# Patient Record
Sex: FEMALE | Race: WHITE | HISPANIC OR LATINO | Employment: FULL TIME | URBAN - METROPOLITAN AREA
[De-identification: names, ages, dates, MRNs, and addresses within clinical notes are randomized per-mention and may not be internally consistent; named-entity substitution may affect disease eponyms.]

---

## 2017-03-11 ENCOUNTER — APPOINTMENT (OUTPATIENT)
Dept: GENERAL RADIOLOGY | Facility: CLINIC | Age: 22
End: 2017-03-11
Attending: EMERGENCY MEDICINE

## 2017-03-11 ENCOUNTER — HOSPITAL ENCOUNTER (EMERGENCY)
Facility: CLINIC | Age: 22
Discharge: HOME | End: 2017-03-11
Attending: EMERGENCY MEDICINE | Admitting: EMERGENCY MEDICINE

## 2017-03-11 VITALS
BODY MASS INDEX: 33.44 KG/M2 | HEART RATE: 91 BPM | OXYGEN SATURATION: 98 % | RESPIRATION RATE: 18 BRPM | SYSTOLIC BLOOD PRESSURE: 116 MMHG | HEIGHT: 63 IN | DIASTOLIC BLOOD PRESSURE: 71 MMHG | WEIGHT: 188.71 LBS | TEMPERATURE: 98.3 F

## 2017-03-11 DIAGNOSIS — N76.0 BACTERIAL VAGINITIS: ICD-10-CM

## 2017-03-11 DIAGNOSIS — J02.0 STREPTOCOCCAL SORE THROAT: ICD-10-CM

## 2017-03-11 DIAGNOSIS — B96.89 BACTERIAL VAGINITIS: ICD-10-CM

## 2017-03-11 LAB
ALBUMIN SERPL-MCNC: 3.9 G/DL (ref 3.4–5)
ALBUMIN UR-MCNC: 30 MG/DL
ALP SERPL-CCNC: 76 U/L (ref 40–150)
ALT SERPL W P-5'-P-CCNC: 25 U/L (ref 0–50)
ANION GAP SERPL CALCULATED.3IONS-SCNC: 9 MMOL/L (ref 3–14)
APPEARANCE UR: ABNORMAL
AST SERPL W P-5'-P-CCNC: 22 U/L (ref 0–45)
BACTERIA #/AREA URNS HPF: ABNORMAL /HPF
BASOPHILS # BLD AUTO: 0 10E9/L (ref 0–0.2)
BASOPHILS NFR BLD AUTO: 0.1 %
BILIRUB SERPL-MCNC: 0.5 MG/DL (ref 0.2–1.3)
BILIRUB UR QL STRIP: NEGATIVE
BUN SERPL-MCNC: 9 MG/DL (ref 7–30)
CALCIUM SERPL-MCNC: 8.5 MG/DL (ref 8.5–10.1)
CHLORIDE SERPL-SCNC: 105 MMOL/L (ref 94–109)
CO2 SERPL-SCNC: 25 MMOL/L (ref 20–32)
COLOR UR AUTO: YELLOW
CREAT SERPL-MCNC: 0.57 MG/DL (ref 0.52–1.04)
DEPRECATED S PYO AG THROAT QL EIA: ABNORMAL
DIFFERENTIAL METHOD BLD: ABNORMAL
EOSINOPHIL # BLD AUTO: 0 10E9/L (ref 0–0.7)
EOSINOPHIL NFR BLD AUTO: 0 %
ERYTHROCYTE [DISTWIDTH] IN BLOOD BY AUTOMATED COUNT: 13.7 % (ref 10–15)
FLUAV+FLUBV AG SPEC QL: NEGATIVE
FLUAV+FLUBV AG SPEC QL: NORMAL
GFR SERPL CREATININE-BSD FRML MDRD: ABNORMAL ML/MIN/1.7M2
GLUCOSE SERPL-MCNC: 103 MG/DL (ref 70–99)
GLUCOSE UR STRIP-MCNC: NEGATIVE MG/DL
HCG SERPL QL: NEGATIVE
HCT VFR BLD AUTO: 40.6 % (ref 35–47)
HGB BLD-MCNC: 13.3 G/DL (ref 11.7–15.7)
HGB UR QL STRIP: ABNORMAL
IMM GRANULOCYTES # BLD: 0.1 10E9/L (ref 0–0.4)
IMM GRANULOCYTES NFR BLD: 0.6 %
KETONES UR STRIP-MCNC: NEGATIVE MG/DL
LACTATE BLD-SCNC: 1.3 MMOL/L (ref 0.7–2.1)
LEUKOCYTE ESTERASE UR QL STRIP: NEGATIVE
LIPASE SERPL-CCNC: 239 U/L (ref 73–393)
LYMPHOCYTES # BLD AUTO: 0.7 10E9/L (ref 0.8–5.3)
LYMPHOCYTES NFR BLD AUTO: 5.2 %
MCH RBC QN AUTO: 30.2 PG (ref 26.5–33)
MCHC RBC AUTO-ENTMCNC: 32.8 G/DL (ref 31.5–36.5)
MCV RBC AUTO: 92 FL (ref 78–100)
MICRO REPORT STATUS: ABNORMAL
MICRO REPORT STATUS: ABNORMAL
MONOCYTES # BLD AUTO: 0.5 10E9/L (ref 0–1.3)
MONOCYTES NFR BLD AUTO: 3.3 %
MUCOUS THREADS #/AREA URNS LPF: PRESENT /LPF
NEUTROPHILS # BLD AUTO: 12.8 10E9/L (ref 1.6–8.3)
NEUTROPHILS NFR BLD AUTO: 90.8 %
NITRATE UR QL: NEGATIVE
NRBC # BLD AUTO: 0 10*3/UL
NRBC BLD AUTO-RTO: 0 /100
PH UR STRIP: 8 PH (ref 5–7)
PLATELET # BLD AUTO: 316 10E9/L (ref 150–450)
POTASSIUM SERPL-SCNC: 3.6 MMOL/L (ref 3.4–5.3)
PROT SERPL-MCNC: 8.4 G/DL (ref 6.8–8.8)
RBC # BLD AUTO: 4.41 10E12/L (ref 3.8–5.2)
RBC #/AREA URNS AUTO: 66 /HPF (ref 0–2)
SODIUM SERPL-SCNC: 139 MMOL/L (ref 133–144)
SP GR UR STRIP: 1.02 (ref 1–1.03)
SPECIMEN SOURCE: ABNORMAL
SPECIMEN SOURCE: ABNORMAL
SPECIMEN SOURCE: NORMAL
SQUAMOUS #/AREA URNS AUTO: 12 /HPF (ref 0–1)
URN SPEC COLLECT METH UR: ABNORMAL
UROBILINOGEN UR STRIP-MCNC: 0 MG/DL (ref 0–2)
WBC # BLD AUTO: 14.1 10E9/L (ref 4–11)
WBC #/AREA URNS AUTO: 16 /HPF (ref 0–2)
WET PREP SPEC: ABNORMAL

## 2017-03-11 PROCEDURE — 96361 HYDRATE IV INFUSION ADD-ON: CPT

## 2017-03-11 PROCEDURE — 84703 CHORIONIC GONADOTROPIN ASSAY: CPT | Performed by: EMERGENCY MEDICINE

## 2017-03-11 PROCEDURE — 87880 STREP A ASSAY W/OPTIC: CPT | Performed by: EMERGENCY MEDICINE

## 2017-03-11 PROCEDURE — 71020 XR CHEST 2 VW: CPT

## 2017-03-11 PROCEDURE — 87804 INFLUENZA ASSAY W/OPTIC: CPT | Performed by: EMERGENCY MEDICINE

## 2017-03-11 PROCEDURE — 87210 SMEAR WET MOUNT SALINE/INK: CPT | Performed by: EMERGENCY MEDICINE

## 2017-03-11 PROCEDURE — 83605 ASSAY OF LACTIC ACID: CPT | Performed by: EMERGENCY MEDICINE

## 2017-03-11 PROCEDURE — 25000128 H RX IP 250 OP 636: Performed by: EMERGENCY MEDICINE

## 2017-03-11 PROCEDURE — 99284 EMERGENCY DEPT VISIT MOD MDM: CPT | Mod: 25

## 2017-03-11 PROCEDURE — 87591 N.GONORRHOEAE DNA AMP PROB: CPT | Performed by: EMERGENCY MEDICINE

## 2017-03-11 PROCEDURE — 87491 CHLMYD TRACH DNA AMP PROBE: CPT | Performed by: EMERGENCY MEDICINE

## 2017-03-11 PROCEDURE — 25000132 ZZH RX MED GY IP 250 OP 250 PS 637: Performed by: EMERGENCY MEDICINE

## 2017-03-11 PROCEDURE — 81001 URINALYSIS AUTO W/SCOPE: CPT | Performed by: EMERGENCY MEDICINE

## 2017-03-11 PROCEDURE — 36415 COLL VENOUS BLD VENIPUNCTURE: CPT

## 2017-03-11 PROCEDURE — 80053 COMPREHEN METABOLIC PANEL: CPT | Performed by: EMERGENCY MEDICINE

## 2017-03-11 PROCEDURE — 96374 THER/PROPH/DIAG INJ IV PUSH: CPT

## 2017-03-11 PROCEDURE — 85025 COMPLETE CBC W/AUTO DIFF WBC: CPT | Performed by: EMERGENCY MEDICINE

## 2017-03-11 PROCEDURE — 87040 BLOOD CULTURE FOR BACTERIA: CPT | Performed by: EMERGENCY MEDICINE

## 2017-03-11 PROCEDURE — 83690 ASSAY OF LIPASE: CPT | Performed by: EMERGENCY MEDICINE

## 2017-03-11 PROCEDURE — 87086 URINE CULTURE/COLONY COUNT: CPT | Performed by: EMERGENCY MEDICINE

## 2017-03-11 PROCEDURE — 25800025 ZZH RX 258: Performed by: EMERGENCY MEDICINE

## 2017-03-11 RX ORDER — CLINDAMYCIN HCL 300 MG
300 CAPSULE ORAL 3 TIMES DAILY
Qty: 30 CAPSULE | Refills: 0 | Status: SHIPPED | OUTPATIENT
Start: 2017-03-11 | End: 2017-03-21

## 2017-03-11 RX ORDER — ACETAMINOPHEN 500 MG
1000 TABLET ORAL EVERY 4 HOURS PRN
Status: DISCONTINUED | OUTPATIENT
Start: 2017-03-11 | End: 2017-03-12 | Stop reason: HOSPADM

## 2017-03-11 RX ORDER — SODIUM CHLORIDE, SODIUM LACTATE, POTASSIUM CHLORIDE, CALCIUM CHLORIDE 600; 310; 30; 20 MG/100ML; MG/100ML; MG/100ML; MG/100ML
INJECTION, SOLUTION INTRAVENOUS CONTINUOUS
Status: DISCONTINUED | OUTPATIENT
Start: 2017-03-11 | End: 2017-03-12 | Stop reason: HOSPADM

## 2017-03-11 RX ORDER — KETOROLAC TROMETHAMINE 30 MG/ML
30 INJECTION, SOLUTION INTRAMUSCULAR; INTRAVENOUS ONCE
Status: COMPLETED | OUTPATIENT
Start: 2017-03-11 | End: 2017-03-11

## 2017-03-11 RX ADMIN — SODIUM CHLORIDE, POTASSIUM CHLORIDE, SODIUM LACTATE AND CALCIUM CHLORIDE 2568 ML: 600; 310; 30; 20 INJECTION, SOLUTION INTRAVENOUS at 21:16

## 2017-03-11 RX ADMIN — KETOROLAC TROMETHAMINE 30 MG: 30 INJECTION, SOLUTION INTRAMUSCULAR at 23:06

## 2017-03-11 RX ADMIN — ACETAMINOPHEN 1000 MG: 500 TABLET, FILM COATED ORAL at 21:24

## 2017-03-11 NOTE — ED AVS SNAPSHOT
[facility] Emergency Department    [address] [place] [address]    Phone:  [phone]    Fax:  [phone]                                       Stacia Hannon   MRN: [id number]    Department:  [facility] Emergency Department   Date of Visit:  3/11/2017           Patient Information     Date Of Birth          1995        Your diagnoses for this visit were:     Streptococcal sore throat     Bacterial vaginitis        You were seen by Korin Hou MD.        Discharge Instructions       Get extra rest and drink plenty of fluids. You may take acetaminophen (Tylenol) 650mg or ibuprofen (Advil/Motrin) 600mg, up to every 6 hours, with food, for fevers/aches.     If you were prescribed medications for your symptoms you may use them as instructed.    See your OB/GYN for reevaluation for the vaginal bleeding and infection. See your primary doctor if your symptoms of sore throat are not improving in 2 days.    If you have any worsening/severe symptoms seek medical care right away.       Pharyngitis: Strep (Confirmed)     You have had a positive test for strep throat. Strep throat is a contagious illness. It is spread by coughing, kissing or by touching others after touching your mouth or nose. Symptoms include throat pain which is worse with swallowing, aching all over, headache and fever. It is treated with antibiotic medication. This should help you start to feel better within 1-2 days.  Home care    Rest at home. Drink plenty of fluids to avoid dehydration.    No work or school for the first 2 days of taking the antibiotics. After this time, you will not be contagious. You can then return to school or work if you are feeling better.     The antibiotic medication must be taken for the full 10 days, even if you feel better. This is very important to ensure the infection is treated. It is also important to prevent drug-resistent organisms from developing. If you were  given an antibiotic shot, no more antibiotics are needed.    You may use acetaminophen (Tylenol) or ibuprofen (Motrin, Advil) to control pain or fever, unless another medicine was prescribed for this. (NOTE: If you have chronic liver or kidney disease or ever had a stomach ulcer or GI bleeding, talk with your doctor before using these medicines.)    Throat lozenges or sprays (such as Chloraseptic) help reduce pain. Gargling with warm salt water will also reduce throat pain. Dissolve 1/2 teaspoon of salt in 1 glass of warm water. This may be useful just before meals.     Soft foods are okay. Avoid salty or spicy foods.  Follow-up care  Follow up with your healthcare provider or our staff if you are not improving over the next week.  When to seek medical advice  Call your healthcare provider right away if any of these occur:    Fever as directed by your doctor     New or worsening ear pain, sinus pain, or headache    Painful lumps in the back of neck    Stiff neck    Lymph nodes are getting larger or becoming soft in the middle    Inability to swallow liquids, excessive drooling, or inability to open mouth wide due to throat pain    Signs of dehydration (very dark urine or no urine, sunken eyes, dizziness)    Trouble breathing or noisy breathing    Muffled voice    New rash    3344-9947 [employer]. [address] [place] [address], [place] [address]. All rights reserved. This information is not intended as a substitute for professional medical care. Always follow your healthcare professional's instructions.      Bacterial Vaginosis    You have a vaginal infection called bacterial vaginosis (BV). Both good and bad bacteria are present in a healthy vagina. BV occurs when these bacteria get out of balance. The number of bad bacteria increase. And the number of good bacteria decrease.  BV may or may not cause symptoms. If symptoms do occur, they can include:    Thin, gray, milky-white, or sometimes green  discharge    Unpleasant odor or  fishy  smell    Itching, burning, or pain in or around the vagina  It is not known what causes BV, but certain factors can make the problem more likely. This can include:    Douching    Having sex with a new partner    Having sex with more than one partner  BV will sometimes go away on its own. But treatment is usually recommended. This is because untreated BV can increase the risk of more serious health problems such as:    Pelvic inflammatory disease (PID)     delivery (giving birth to a baby early if you re pregnant)    HIV and certain other sexually transmitted diseases (STDs)    Infection after surgery on the reproductive organs  Home care  General care    BV is most often treated with medicines called antibiotics. These may be given as pills or as a vaginal cream. If antibiotics are prescribed, be sure to use them exactly as directed. Also, be sure to complete all of the medicine, even if your symptoms go away.    Avoid douching or having sex during treatment.    If you have sex with a female partner, ask your healthcare provider if she should also be treated.  Prevention    Limit or avoid douching.    Avoid having sex. If you do have sex, then take steps to lower your risk:    Use condoms when having sex.    Limit the number of partners you have sex with.  Follow-up care  Follow up with your healthcare provider, or as advised.  When to seek medical advice  Call your healthcare provider right away if:    You have a fever of 100.4 F (38 C) or higher, or as directed by your provider.    Your symptoms worsen, or they don t go away within a few days of starting treatment.    You have new pain in the lower belly or pelvic region.    You have side effects that bother you or a reaction to the pills or cream you re prescribed.    You or any partners you have sex with have new symptoms, such as a rash, joint pain, or sores.    1175-0376 [employer]. [address] [place] [address]  [facility], [place] [address]. All rights reserved. This information is not intended as a substitute for professional medical care. Always follow your healthcare professional's instructions.      Dysfunctional Uterine Bleeding    Dysfunctional uterine bleeding is a condition in which bleeding is abnormal and occurs at unexpected times of the month. This happens due to changes in the hormones that help control a woman s menstrual cycle each month.  The bleeding may be heavier or lighter than normal. If you have heavy bleeding often, this can lead to a problem called anemia. With anemia, your red blood cell count is too low. Red blood cells are needed because they help carry oxygen throughout your body. Severe anemia may cause you to look pale and feel very weak or tired. You might also become short of breath easily.  To treat dysfunctional uterine bleeding, medicines are often tried first. If these don t help, further testing and treatments may be needed. Discuss all of your options with your provider.  Home care  Medicines  If you re prescribed medicines, be sure to take them as directed. Some of the more common medicines you may be prescribed include:    Hormone therapy (Options include most methods of hormonal birth control such as pills, shots, or a hormone-releasing IUD)    Nonsteroidal anti-inflammatory drugs (NSAIDs), such as ibuprofen    Iron supplements, if you have anemia     General care    Get plenty of rest if you tire easily. Avoid heavy exertion.    To help relieve pain or cramping that may occur with bleeding, try using a heating pad on the lower belly or back. A warm bath may also help.  Follow-up care  Follow up with your healthcare provider as directed.  When to seek medical advice  Call your healthcare provider right away if:    Bleeding becomes heavy (soaking 1 pad or tampon every hour for 3 hours)    Increased abdominal pain    Irregular bleeding worsens or does not get better even with  treatment    Fever of 100.4 F (38 C) or higher, or as directed by your provider    Signs of anemia, such as pale skin, extreme fatigue or weakness, or shortness of breath    Dizziness or fainting       [id number] [employer]. [address] [place] [address], [place] [address]. All rights reserved. This information is not intended as a substitute for professional medical care. Always follow your healthcare professional's instructions.              24 Hour Appointment Hotline       To make an appointment at any [facility], call [phone] ([phone]). If you don't have a family doctor or clinic, we will help you find one. [facility] clinics are conveniently located to serve the needs of you and your family.             Review of your medicines      START taking        Dose / Directions Last dose taken    clindamycin 300 MG capsule   Commonly known as:  CLEOCIN   Dose:  300 mg   Quantity:  30 capsule        Take 1 capsule (300 mg) by mouth 3 times daily for 10 days   Refills:  0          Our records show that you are taking the medicines listed below. If these are incorrect, please call your family doctor or clinic.        Dose / Directions Last dose taken    HYDROcodone-acetaminophen 5-325 MG per tablet   Commonly known as:  NORCO   Dose:  1-2 tablet   Quantity:  15 tablet        Take 1-2 tablets by mouth every 4 hours as needed for pain   Refills:  0        ibuprofen 600 MG tablet   Commonly known as:  ADVIL/MOTRIN   Dose:  600 mg   Quantity:  30 tablet        Take 1 tablet (600 mg) by mouth every 6 hours as needed for moderate pain   Refills:  1        NYQUIL PO        Refills:  0        sucralfate 1 GM/10ML suspension   Commonly known as:  CARAFATE   Dose:  1 g   Quantity:  420 mL        Take 10 mLs (1 g) by mouth 4 times daily   Refills:  1                Prescriptions were sent or printed at these locations (1 Prescription)                   Other Prescriptions                Printed at  Department/Unit printer (1 of 1)         clindamycin (CLEOCIN) 300 MG capsule                Procedures and tests performed during your visit     Procedure/Test Number of Times Performed    Blood culture 2    CBC with platelets differential 1    Cardiac Continuous Monitoring 1    Chlamydia trachomatis PCR 1    Comprehensive metabolic panel 1    HCG qualitative 1    Influenza A/B antigen 1    Lactic acid whole blood 1    Lipase 1    Measure urine output 1    Neisseria gonorrhoea PCR 1    Patient care order 1    Prep for procedure - pelvic exam 1    Pulse oximetry nursing 1    Rapid strep screen 1    UA with Microscopic 1    Urine Culture Aerobic Bacterial 1    Wet prep 1    XR Chest 2 Views 1      Orders Needing Specimen Collection     None      Pending Results     Date and Time Order Name Status Description    3/11/2017 2111 Blood culture In process     3/11/2017 2052 Neisseria gonorrhoea PCR In process     3/11/2017 2052 Chlamydia trachomatis PCR In process     3/11/2017 2052 Blood culture Preliminary     3/11/2017 2052 Urine Culture Aerobic Bacterial In process             Pending Culture Results     Date and Time Order Name Status Description    3/11/2017 2111 Blood culture In process     3/11/2017 2052 Neisseria gonorrhoea PCR In process     3/11/2017 2052 Chlamydia trachomatis PCR In process     3/11/2017 2052 Blood culture Preliminary     3/11/2017 2052 Urine Culture Aerobic Bacterial In process              Test Results from your hospital stay     3/11/2017  9:33 PM - Interface, Flexilab Results      Component Results     Component Value Ref Range & Units Status    WBC 14.1 (H) 4.0 - 11.0 10e9/L Final    RBC Count 4.41 3.8 - 5.2 10e12/L Final    Hemoglobin 13.3 11.7 - 15.7 g/dL Final    Hematocrit 40.6 35.0 - 47.0 % Final    MCV 92 78 - 100 fl Final    MCH 30.2 26.5 - 33.0 pg Final    MCHC 32.8 31.5 - 36.5 g/dL Final    RDW 13.7 10.0 - 15.0 % Final    Platelet Count 316 150 - 450 10e9/L Final    Diff Method  Automated Method  Final    % Neutrophils 90.8 % Final    % Lymphocytes 5.2 % Final    % Monocytes 3.3 % Final    % Eosinophils 0.0 % Final    % Basophils 0.1 % Final    % Immature Granulocytes 0.6 % Final    Nucleated RBCs 0 0 /100 Final    Absolute Neutrophil 12.8 (H) 1.6 - 8.3 10e9/L Final    Absolute Lymphocytes 0.7 (L) 0.8 - 5.3 10e9/L Final    Absolute Monocytes 0.5 0.0 - 1.3 10e9/L Final    Absolute Eosinophils 0.0 0.0 - 0.7 10e9/L Final    Absolute Basophils 0.0 0.0 - 0.2 10e9/L Final    Abs Immature Granulocytes 0.1 0 - 0.4 10e9/L Final    Absolute Nucleated RBC 0.0  Final         3/11/2017  9:49 PM - Interface, Flexilab Results      Component Results     Component Value Ref Range & Units Status    Sodium 139 133 - 144 mmol/L Final    Potassium 3.6 3.4 - 5.3 mmol/L Final    Chloride 105 94 - 109 mmol/L Final    Carbon Dioxide 25 20 - 32 mmol/L Final    Anion Gap 9 3 - 14 mmol/L Final    Glucose 103 (H) 70 - 99 mg/dL Final    Urea Nitrogen 9 7 - 30 mg/dL Final    Creatinine 0.57 0.52 - 1.04 mg/dL Final    GFR Estimate >90  Non  GFR Calc   >60 mL/min/1.7m2 Final    GFR Estimate If Black >90   GFR Calc   >60 mL/min/1.7m2 Final    Calcium 8.5 8.5 - 10.1 mg/dL Final    Bilirubin Total 0.5 0.2 - 1.3 mg/dL Final    Albumin 3.9 3.4 - 5.0 g/dL Final    Protein Total 8.4 6.8 - 8.8 g/dL Final    Alkaline Phosphatase 76 40 - 150 U/L Final    ALT 25 0 - 50 U/L Final    AST 22 0 - 45 U/L Final         3/11/2017  9:36 PM - Interface, Flexilab Results      Component Results     Component Value Ref Range & Units Status    Lactic Acid 1.3 0.7 - 2.1 mmol/L Final         3/11/2017 11:27 PM - Interface, Flexilab Results      Component Results     Component Value Ref Range & Units Status    Color Urine Yellow  Final    Appearance Urine Slightly Cloudy  Final    Glucose Urine Negative NEG mg/dL Final    Bilirubin Urine Negative NEG Final    Ketones Urine Negative NEG mg/dL Final    Specific Gravity  Urine 1.020 1.003 - 1.035 Final    Blood Urine Large (A) NEG Final    pH Urine 8.0 (H) 5.0 - 7.0 pH Final    Protein Albumin Urine 30 (A) NEG mg/dL Final    Urobilinogen mg/dL 0.0 0.0 - 2.0 mg/dL Final    Nitrite Urine Negative NEG Final    Leukocyte Esterase Urine Negative NEG Final    Source Midstream Urine  Final    WBC Urine 16 (H) 0 - 2 /HPF Final    RBC Urine 66 (H) 0 - 2 /HPF Final    Bacteria Urine Moderate (A) NEG /HPF Final    Squamous Epithelial /HPF Urine 12 (H) 0 - 1 /HPF Final    Mucous Urine Present (A) NEG /LPF Final         3/11/2017 11:21 PM - Interface, Flexilab Results         3/11/2017 11:21 PM - Interface, Flexilab Results      Component Results     Component    Specimen Description    Blood Right Arm    Special Requests    Aerobic and anaerobic bottles received    Culture Micro    Pending    Micro Report Status    Pending         3/11/2017  9:40 PM - Interface, Flexilab Results      Component Results     Component    Specimen Description    Throat    Rapid Strep A Screen (Abnormal)    POSITIVE: Group A Streptococcal antigen detected by immunoassay.    Micro Report Status    FINAL 03/11/2017         3/11/2017 10:18 PM - Interface, Radiant Ib      Narrative     CHEST TWO VIEW   3/11/2017 9:42 PM     HISTORY: Fever.    COMPARISON: 9/25/2016        Impression     IMPRESSION: No acute cardiopulmonary disease.    ROM RUBY MD         3/11/2017 10:08 PM - Interface, Flexilab Results      Component Results     Component Value Ref Range & Units Status    HCG Qualitative Serum Negative NEG Final         3/11/2017 11:27 PM - Interface, Flexilab Results      Component Results     Component    Specimen Description    Vagina    Wet Prep (Abnormal)    Moderate PMNs seen  Clue cells seen  No yeast seen  No Trichomonas seen      Micro Report Status    FINAL 03/11/2017         3/11/2017 11:19 PM - Interface, Flexilab Results         3/11/2017 11:19 PM - Interface, Flexilab Results         3/11/2017  9:49 PM -  Interface, Flexilab Results      Component Results     Component Value Ref Range & Units Status    Lipase 239 73 - 393 U/L Final         3/11/2017 10:41 PM - Interface, Flexilab Results         3/11/2017  9:55 PM - Interface, Flexilab Results      Component Results     Component Value Ref Range & Units Status    Influenza A/B Agn Specimen Nasopharyngeal  Final    Influenza A Negative NEG Final    Influenza B  NEG Final    Negative   Test results must be correlated with clinical data. If necessary, results   should be confirmed by a molecular assay or viral culture.                  Clinical Quality Measure: Blood Pressure Screening     Your blood pressure was checked while you were in the emergency department today. The last reading we obtained was  BP: 96/41 . Please read the guidelines below about what these numbers mean and what you should do about them.  If your systolic blood pressure (the top number) is less than 120 and your diastolic blood pressure (the bottom number) is less than 80, then your blood pressure is normal. There is nothing more that you need to do about it.  If your systolic blood pressure (the top number) is 120-139 or your diastolic blood pressure (the bottom number) is 80-89, your blood pressure may be higher than it should be. You should have your blood pressure rechecked within a year by a primary care provider.  If your systolic blood pressure (the top number) is 140 or greater or your diastolic blood pressure (the bottom number) is 90 or greater, you may have high blood pressure. High blood pressure is treatable, but if left untreated over time it can put you at risk for heart attack, stroke, or kidney failure. You should have your blood pressure rechecked by a primary care provider within the next 4 weeks.  If your provider in the emergency department today gave you specific instructions to follow-up with your doctor or provider even sooner than that, you should follow that instruction  "and not wait for up to 4 weeks for your follow-up visit.        Thank you for choosing [facility]       Thank you for choosing [facility] for your care. Our goal is always to provide you with excellent care. Hearing back from our patients is one way we can continue to improve our services. Please take a few minutes to complete the written survey that you may receive in the mail after you visit with us. Thank you!        [employer] Information     [employer] lets you send messages to your doctor, view your test results, renew your prescriptions, schedule appointments and more. To sign up, go to [email or web address] . Click on \"Log in\" on the left side of the screen, which will take you to the Welcome page. Then click on \"Sign up Now\" on the right side of the page.     You will be asked to enter the access code listed below, as well as some personal information. Please follow the directions to create your username and password.     Your access code is: [email or web address]  Expires: 2017 11:42 PM     Your access code will  in 90 days. If you need help or a new code, please call your [facility] clinic or [phone].        Care EveryWhere ID     This is your Care EveryWhere ID. This could be used by other organizations to access your [facility] medical records  [id number]        After Visit Summary       This is your record. Keep this with you and show to your community pharmacist(s) and doctor(s) at your next visit.                  "

## 2017-03-12 ENCOUNTER — HOSPITAL ENCOUNTER (EMERGENCY)
Facility: CLINIC | Age: 22
Discharge: HOME | End: 2017-03-12
Attending: EMERGENCY MEDICINE | Admitting: EMERGENCY MEDICINE

## 2017-03-12 VITALS
RESPIRATION RATE: 16 BRPM | HEART RATE: 86 BPM | TEMPERATURE: 99.3 F | SYSTOLIC BLOOD PRESSURE: 92 MMHG | OXYGEN SATURATION: 96 % | DIASTOLIC BLOOD PRESSURE: 54 MMHG

## 2017-03-12 DIAGNOSIS — J02.0 STREPTOCOCCAL SORE THROAT: ICD-10-CM

## 2017-03-12 LAB
C TRACH DNA SPEC QL NAA+PROBE: NORMAL
CO2 BLDCOV-SCNC: 23 MMOL/L (ref 21–28)
LACTATE BLD-SCNC: 0.9 MMOL/L (ref 0.7–2.1)
N GONORRHOEA DNA SPEC QL NAA+PROBE: NORMAL
PCO2 BLDV: 33 MM HG (ref 40–50)
PH BLDV: 7.44 PH (ref 7.32–7.43)
PO2 BLDV: 40 MM HG (ref 25–47)
SAO2 % BLDV FROM PO2: 78 %
SPECIMEN SOURCE: NORMAL
SPECIMEN SOURCE: NORMAL

## 2017-03-12 PROCEDURE — 25000128 H RX IP 250 OP 636: Performed by: EMERGENCY MEDICINE

## 2017-03-12 PROCEDURE — 96376 TX/PRO/DX INJ SAME DRUG ADON: CPT | Performed by: EMERGENCY MEDICINE

## 2017-03-12 PROCEDURE — 99284 EMERGENCY DEPT VISIT MOD MDM: CPT | Mod: Z6 | Performed by: EMERGENCY MEDICINE

## 2017-03-12 PROCEDURE — 99285 EMERGENCY DEPT VISIT HI MDM: CPT | Mod: 25 | Performed by: EMERGENCY MEDICINE

## 2017-03-12 PROCEDURE — 96361 HYDRATE IV INFUSION ADD-ON: CPT | Performed by: EMERGENCY MEDICINE

## 2017-03-12 PROCEDURE — 25000125 ZZHC RX 250: Performed by: EMERGENCY MEDICINE

## 2017-03-12 PROCEDURE — 25000128 H RX IP 250 OP 636: Performed by: FAMILY MEDICINE

## 2017-03-12 PROCEDURE — 83605 ASSAY OF LACTIC ACID: CPT

## 2017-03-12 PROCEDURE — 96375 TX/PRO/DX INJ NEW DRUG ADDON: CPT | Performed by: EMERGENCY MEDICINE

## 2017-03-12 PROCEDURE — 96374 THER/PROPH/DIAG INJ IV PUSH: CPT | Performed by: EMERGENCY MEDICINE

## 2017-03-12 PROCEDURE — 82803 BLOOD GASES ANY COMBINATION: CPT

## 2017-03-12 RX ORDER — KETOROLAC TROMETHAMINE 30 MG/ML
30 INJECTION, SOLUTION INTRAMUSCULAR; INTRAVENOUS ONCE
Status: COMPLETED | OUTPATIENT
Start: 2017-03-12 | End: 2017-03-12

## 2017-03-12 RX ORDER — CLINDAMYCIN PHOSPHATE 20 MG/G
1 CREAM VAGINAL AT BEDTIME
Qty: 40 G | Refills: 0 | Status: SHIPPED | OUTPATIENT
Start: 2017-03-12 | End: 2017-03-19

## 2017-03-12 RX ORDER — OLANZAPINE 5 MG/1
10 TABLET, ORALLY DISINTEGRATING ORAL ONCE
Status: DISCONTINUED | OUTPATIENT
Start: 2017-03-12 | End: 2017-03-12

## 2017-03-12 RX ORDER — DEXAMETHASONE SODIUM PHOSPHATE 10 MG/ML
10 INJECTION, SOLUTION INTRAMUSCULAR; INTRAVENOUS ONCE
Status: COMPLETED | OUTPATIENT
Start: 2017-03-12 | End: 2017-03-12

## 2017-03-12 RX ORDER — LORAZEPAM 2 MG/ML
1 INJECTION INTRAMUSCULAR ONCE
Status: DISCONTINUED | OUTPATIENT
Start: 2017-03-12 | End: 2017-03-12

## 2017-03-12 RX ORDER — SODIUM CHLORIDE 9 MG/ML
1000 INJECTION, SOLUTION INTRAVENOUS CONTINUOUS
Status: DISCONTINUED | OUTPATIENT
Start: 2017-03-12 | End: 2017-03-12 | Stop reason: HOSPADM

## 2017-03-12 RX ORDER — ONDANSETRON 2 MG/ML
4 INJECTION INTRAMUSCULAR; INTRAVENOUS EVERY 30 MIN PRN
Status: DISCONTINUED | OUTPATIENT
Start: 2017-03-12 | End: 2017-03-12 | Stop reason: HOSPADM

## 2017-03-12 RX ORDER — OXYCODONE HCL 5 MG/5 ML
5 SOLUTION, ORAL ORAL EVERY 4 HOURS PRN
Qty: 90 ML | Refills: 0 | Status: SHIPPED | OUTPATIENT
Start: 2017-03-12 | End: 2017-05-13

## 2017-03-12 RX ORDER — PENICILLIN V POTASSIUM 250 MG/5ML
250 SOLUTION, RECONSTITUTED, ORAL ORAL 2 TIMES DAILY
Qty: 100 ML | Refills: 0 | Status: SHIPPED | OUTPATIENT
Start: 2017-03-12 | End: 2017-05-13

## 2017-03-12 RX ORDER — HYDROMORPHONE HYDROCHLORIDE 1 MG/ML
0.5 INJECTION, SOLUTION INTRAMUSCULAR; INTRAVENOUS; SUBCUTANEOUS
Status: DISCONTINUED | OUTPATIENT
Start: 2017-03-12 | End: 2017-03-12 | Stop reason: HOSPADM

## 2017-03-12 RX ORDER — SODIUM CHLORIDE 9 MG/ML
1000 INJECTION, SOLUTION INTRAVENOUS CONTINUOUS
Status: DISCONTINUED | OUTPATIENT
Start: 2017-03-12 | End: 2017-03-12

## 2017-03-12 RX ADMIN — KETOROLAC TROMETHAMINE 30 MG: 30 INJECTION, SOLUTION INTRAMUSCULAR at 12:26

## 2017-03-12 RX ADMIN — SODIUM CHLORIDE 1000 ML: 9 INJECTION, SOLUTION INTRAVENOUS at 13:48

## 2017-03-12 RX ADMIN — SODIUM CHLORIDE 1000 ML: 9 INJECTION, SOLUTION INTRAVENOUS at 12:13

## 2017-03-12 RX ADMIN — HYDROMORPHONE HYDROCHLORIDE 1 MG: 1 INJECTION, SOLUTION INTRAMUSCULAR; INTRAVENOUS; SUBCUTANEOUS at 12:20

## 2017-03-12 RX ADMIN — SODIUM CHLORIDE 1000 ML: 9 INJECTION, SOLUTION INTRAVENOUS at 15:16

## 2017-03-12 RX ADMIN — HYDROMORPHONE HYDROCHLORIDE 0.5 MG: 10 INJECTION, SOLUTION INTRAMUSCULAR; INTRAVENOUS; SUBCUTANEOUS at 13:18

## 2017-03-12 RX ADMIN — DEXAMETHASONE SODIUM PHOSPHATE 10 MG: 10 INJECTION, SOLUTION INTRAMUSCULAR; INTRAVENOUS at 12:22

## 2017-03-12 NOTE — ED NOTES
Bed: ED27  Expected date:   Expected time:   Means of arrival: Ambulance  Comments:  Rocco Hopkins

## 2017-03-12 NOTE — ED AVS SNAPSHOT
[facility], Emergency Department    [address] [place] [address]    Phone:  [phone]    Fax:  [phone]                                       Stacia Hannon   MRN: [id number]    Department:  [facility], Emergency Department   Date of Visit:  3/12/2017           Patient Information     Date Of Birth          1995        Your diagnoses for this visit were:     Streptococcal sore throat        You were seen by Vipul Flores MD.        Discharge Instructions         Faringitis Por Estreptococos [Pharyngitis, Strep - Confirmed]    [place] prueba milad positiva a la infección por estreptococos. Se trata de ratna enfermedad contagiosa. La puede contagiar al toser, al besar o al tocar a otras personas después de haberse tocado la boca o la nariz. Los síntomas incluyen dolor de garganta que empeora al tragar, dolor en todo el cuerpo, dolor de tonio y fiebre. [place] tratamiento, le darán un antibiótico con el que debería empezar a sentirse mejor dentro de 1 ó 2 días.  Cuidados En [place]:    Descanse en mccurdy casa y christiano abundante cantidad de líquidos para evitar la deshidratación.    Deberá ausentarse del trabajo o la escuela los dos primeros días del tratamiento con antibióticos. Después de eso, la enfermedad ya no es contagiosa y, si se siente mejor, puede regresar a la escuela o el trabajo.    [place] los antibióticos deep los 10 días completos, incluso aunque se sienta melanie después de los primeros gerber de tratamiento. [place] es muy importante para evitar las afecciones del corazón o los riñones que pueden surgir ericka complicación de ratna infección de garganta por estreptococo no curada.    Niños: Use acetaminofén (Tylenol) para aliviar la fiebre, el nerviosismo y el malestar. En niños mayores de seis meses puede usar ibuprofeno (ericka Motrin infantil) en vez de Tylenol.  [NOTA: Si el kiley tiene ratna enfermedad hepática o renal crónica, o ha tenido alguna vez ratna úlcera estomacal o sangrado  gastrointestinal, consulte con rodgers médico antes de darle estos medicamentos.] (La aspirina no debe usarse nunca en personas menores de 18 años enfermas con fiebre, ya que puede causar daños graves al hígado.)Adultos: Puede usar acetaminofén (Tylenol) o ibuprofeno (Motrin o Advil) para controlar la fiebre o el dolor, a menos que le hayan recetado otro medicamento. [NOTA: Si tiene ratna enfermedad hepática o renal crónica, o ha tenido alguna vez ratna úlcera estomacal o sangrado gastrointestinal, consulte con rodgers médico antes de jose estos medicamentos.]    Hay comprimidos o sprays para la garganta (Chloraseptic y otros) que ayudarán a reducir el dolor. También puede hacer gárgaras con agua tibia con sal para aliviar el dolor de garganta. Disuelva   cucharadita de sal en 1 vaso de agua tibia. Resulta especialmente útil hacerlo antes de las comidas.  Programe ratna VISITA DE CONTROL con rodgers médico, o según le indique nuestro personal médico, si no empieza a sentirse mejor deep la semana próxima.  Busque Prontamente Atención Médica  si algo de lo siguiente ocurre:    Fiebre de 100.4 F (38 C) o más maricarmen, tomada oralmente, que no mejora con los medicamentos.    Dolor nuevo o que va empeorando en los oídos, los senos paranasales o la tonio.    Bultos dolorosos en la parte de atrás del carley.    No puede tragar líquidos ni abrir melanie la boca debido al dolor de garganta.    Tiene problemas para respirar o hace ruidos al respirar.    Disfonía (cambios en la voz).    Ratna nueva erupción cutánea (salpullido).    [id number] [employer]. [address] [place] [address], [place] [address]. Todos los derechos reservados. Esta información no pretende sustituir la atención médica profesional. Sólo rodgers médico puede diagnosticar y tratar un problema de reno.          24 Hour Appointment Hotline       To make an appointment at any [place] clinic, call [phone] ([phone]). If you don't have a family doctor or clinic, we will  help you find one. [facility] are conveniently located to serve the needs of you and your family.             Review of your medicines      START taking        Dose / Directions Last dose taken    clindamycin 2 % cream   Commonly known as:  CLEOCIN   Dose:  1 applicator   Quantity:  40 g        Place 1 applicator vaginally At Bedtime for 7 days   Refills:  0        oxyCODONE 5 MG/5ML solution   Commonly known as:  ROXICODONE   Dose:  5 mg   Quantity:  90 mL        Take 5 mLs (5 mg) by mouth every 4 hours as needed for pain maximum 30 mL per day   Refills:  0        penicillin V 250 mg/5 mL suspension   Commonly known as:  VEETID   Dose:  250 mg   Quantity:  100 mL        Take 5 mLs (250 mg) by mouth 2 times daily   Refills:  0          Our records show that you are taking the medicines listed below. If these are incorrect, please call your family doctor or clinic.        Dose / Directions Last dose taken    clindamycin 300 MG capsule   Commonly known as:  CLEOCIN   Dose:  300 mg   Quantity:  30 capsule        Take 1 capsule (300 mg) by mouth 3 times daily for 10 days   Refills:  0        ibuprofen 600 MG tablet   Commonly known as:  ADVIL/MOTRIN   Dose:  600 mg   Quantity:  30 tablet        Take 1 tablet (600 mg) by mouth every 6 hours as needed for moderate pain   Refills:  1        NYQUIL PO        Refills:  0                Prescriptions were sent or printed at these locations (3 Prescriptions)                   Other Prescriptions                Printed at Department/Unit printer (3 of 3)         penicillin V (VEETID) 250 mg/5 mL suspension               oxyCODONE (ROXICODONE) 5 MG/5ML solution               clindamycin (CLEOCIN) 2 % cream                Procedures and tests performed during your visit     ISTAT CG4 gases lactate nicole nursing POCT    ISTAT gases lactate nicole POCT    Peripheral IV: Standard      Orders Needing Specimen Collection     None      Pending Results     Date and Time Order Name  "Status Description    3/11/2017 2111 Blood culture Preliminary     3/11/2017 2052 Neisseria gonorrhoea PCR In process     3/11/2017 2052 Chlamydia trachomatis PCR In process     3/11/2017 2052 Blood culture Preliminary     3/11/2017 2052 Urine Culture Aerobic Bacterial Preliminary             Pending Culture Results     Date and Time Order Name Status Description    3/11/2017 2111 Blood culture Preliminary     3/11/2017 2052 Neisseria gonorrhoea PCR In process     3/11/2017 2052 Chlamydia trachomatis PCR In process     3/11/2017 2052 Blood culture Preliminary     3/11/2017 2052 Urine Culture Aerobic Bacterial Preliminary             Thank you for choosing [facility]       Thank you for choosing [facility] for your care. Our goal is always to provide you with excellent care. Hearing back from our patients is one way we can continue to improve our services. Please take a few minutes to complete the written survey that you may receive in the mail after you visit with us. Thank you!        [email or web address] Information     [employer] lets you send messages to your doctor, view your test results, renew your prescriptions, schedule appointments and more. To sign up, go to [email or web address] . Click on \"Log in\" on the left side of the screen, which will take you to the Welcome page. Then click on \"Sign up Now\" on the right side of the page.     You will be asked to enter the access code listed below, as well as some personal information. Please follow the directions to create your username and password.     Your access code is: [email or web address]  Expires: 6/10/2017 12:42 AM     Your access code will  in 90 days. If you need help or a new code, please call your [facility] clinic or [phone].        Care EveryWhere ID     This is your Care EveryWhere ID. This could be used by other organizations to access your [facility] medical records  [id number]        After Visit Summary       This is your record. Keep this with you and show to " your community pharmacist(s) and doctor(s) at your next visit.

## 2017-03-12 NOTE — ED AVS SNAPSHOT
[facility], [place], Emergency Department    [address]    [facility] [address]    Phone:  [phone]    Fax:  [phone]                                       Stacia Hannon   MRN: [id number]    Department:  [facility], Emergency Department   Date of Visit:  3/12/2017           After Visit Summary Signature Page     I have received my discharge instructions, and my questions have been answered. I have discussed any challenges I see with this plan with the nurse or doctor.    ..........................................................................................................................................  Patient/Patient Representative Signature      ..........................................................................................................................................  Patient Representative Print Name and Relationship to Patient    ..................................................               ................................................  Date                                            Time    ..........................................................................................................................................  Reviewed by Signature/Title    ...................................................              ..............................................  Date                                                            Time

## 2017-03-12 NOTE — ED PROVIDER NOTES
History     Chief Complaint   Patient presents with     Pharyngitis     pt has been ill 3 days has sore throat and upper back pain , seen at [facility] ED yesterday, sx now worse. pt did not  abx due to cost     HPI  Stacia Hannon is a 21 year old female who presents to the emergency department today with complaints of sore throat. Patient was seen in the ED at [facility] yesterday and was diagnosed with a strep throat. She was prescribed a course of clindamycin, however, patient states she did not fill the medication due to cost. Patient states she has not taken anything for the pain because she is having difficulty swallowing. She denies any other complaints currently.     I have reviewed the Medications, Allergies, Past Medical and Surgical History, and Social History in the [employer] system.    Past Medical History:   Diagnosis Date     Anemia complicating pregnancy in third trimester 2014     Irregular menses 2017      (normal spontaneous vaginal delivery)     full term     Pancreatitis Aug, 2014     Vaginal delivery 2014       Past Surgical History:   Procedure Laterality Date     LAPAROSCOPIC CHOLECYSTECTOMY WITH CHOLANGIOGRAMS N/A 2014    Procedure: LAPAROSCOPIC CHOLECYSTECTOMY WITH CHOLANGIOGRAMS;  Surgeon: Sheldon Watts MD;  Location:  OR       No family history on file.    Social History   Substance Use Topics     Smoking status: Never Smoker     Smokeless tobacco: Never Used     Alcohol use No     No current facility-administered medications for this encounter.      Current Outpatient Prescriptions   Medication     penicillin V (VEETID) 250 mg/5 mL suspension     oxyCODONE (ROXICODONE) 5 MG/5ML solution     Pseudoeph-Doxylamine-DM-APAP (NYQUIL PO)     ibuprofen (ADVIL,MOTRIN) 600 MG tablet      No Known Allergies    Review of Systems    ROS: 10 point ROS neg other than the symptoms noted above in the HPI.      Physical Exam   BP: 100/61  Pulse: 77  Temp: 101.8  F (38.8   C)  Resp: 18  SpO2: 97 %  Physical Exam Exam:  Constitutional: healthy, alert and no distress  Head: Normocephalic. No masses, lesions, tenderness or abnormalities  Neck: Neck supple. No adenopathy but posterior oropharynx is inflamed equally bilateral no evidence of abscess.. Thyroid symmetric, normal size,, Carotids without bruits.  ENT: ENT exam normal, no neck nodes or sinus tenderness        ED Course     ED Course     Procedures          Labs Ordered and Resulted from Time of ED Arrival Up to the Time of Departure from the ED   ISTAT  GASES LACTATE TRISTEN POCT - Abnormal; Notable for the following:        Result Value    Ph Venous 7.44 (*)     PCO2 Venous 33 (*)     All other components within normal limits   PERIPHERAL IV CATHETER   ISTAT CG4 GASES LACTATE TRISTEN NURSING POCT       Assessments & Plan (with Medical Decision Making)   Was given meds but had not filled and increase in pain. Will change to Pen V for cost concerns and equal effectiveness. No evidence of abscess and will need pain mgmt. Was given pain meds and decadron in the ED with mod effectiveness. No problem swallowing at the time of d/c.    I have reviewed the nursing notes.    I have reviewed the findings, diagnosis, plan and need for follow up with the patient.    Discharge Medication List as of 3/12/2017  3:42 PM      START taking these medications    Details   penicillin V (VEETID) 250 mg/5 mL suspension Take 5 mLs (250 mg) by mouth 2 times daily, Disp-100 mL, R-0, Local Print      oxyCODONE (ROXICODONE) 5 MG/5ML solution Take 5 mLs (5 mg) by mouth every 4 hours as needed for pain maximum 30 mL per day, Disp-90 mL, R-0, Local Print      clindamycin (CLEOCIN) 2 % cream Place 1 applicator vaginally At Bedtime for 7 daysDisp-40 g, R-0Local Print             Final diagnoses:   Streptococcal sore throat   I, Paulina Santana, am serving as a trained medical scribe to document services personally performed by Vipul Flores MD, based on the provider's  statements to me.      I, Vipul Flores MD, was physically present and have reviewed and verified the accuracy of this note documented by Paulina Santana.       3/12/2017   [facility], [place], EMERGENCY DEPARTMENT     Vipul Flores MD  03/28/17 1551

## 2017-03-12 NOTE — DISCHARGE INSTRUCTIONS
Faringitis Por Estreptococos [Pharyngitis, Strep - Confirmed]    Rodgers prueba milad positiva a la infección por estreptococos. Se trata de ratna enfermedad contagiosa. La puede contagiar al toser, al besar o al tocar a otras personas después de haberse tocado la boca o la nariz. Los síntomas incluyen dolor de garganta que empeora al tragar, dolor en todo el cuerpo, dolor de tonio y fiebre. [place] tratamiento, le darán un antibiótico con el que debería empezar a sentirse mejor dentro de 1 ó 2 días.  Cuidados En [place]:    Descanse en rodgers casa y christiano abundante cantidad de líquidos para evitar la deshidratación.    Deberá ausentarse del trabajo o la escuela los dos primeros días del tratamiento con antibióticos. Después de eso, la enfermedad ya no es contagiosa y, si se siente mejor, puede regresar a la escuela o el trabajo.    [place] los antibióticos deep los 10 días completos, incluso aunque se sienta melanie después de los primeros gerber de tratamiento. [place] es muy importante para evitar las afecciones del corazón o los riñones que pueden surgir ericka complicación de ratna infección de garganta por estreptococo no curada.    Niños: Use acetaminofén (Tylenol) para aliviar la fiebre, el nerviosismo y el malestar. En niños mayores de seis meses puede usar ibuprofeno (ericka Motrin infantil) en vez de Tylenol.  [NOTA: Si el kiley tiene ratna enfermedad hepática o renal crónica, o ha tenido alguna vez ratna úlcera estomacal o sangrado gastrointestinal, consulte con rodgers médico antes de darle estos medicamentos.] (La aspirina no debe usarse nunca en personas menores de 18 años enfermas con fiebre, ya que puede causar daños graves al hígado.)Adultos: Puede usar acetaminofén (Tylenol) o ibuprofeno (Motrin o Advil) para controlar la fiebre o el dolor, a menos que le hayan recetado otro medicamento. [NOTA: Si tiene ratna enfermedad hepática o renal crónica, o ha tenido alguna vez ratna úlcera estomacal o sangrado gastrointestinal, consulte con rodgers  médico antes de jose estos medicamentos.]    Hay comprimidos o sprays para la garganta (Chloraseptic y otros) que ayudarán a reducir el dolor. También puede hacer gárgaras con agua tibia con sal para aliviar el dolor de garganta. Disuelva   cucharadita de sal en 1 vaso de agua tibia. Resulta especialmente útil hacerlo antes de las comidas.  Programe ratna VISITA DE CONTROL con rodgers médico, o según le indique nuestro personal médico, si no empieza a sentirse mejor deep la semana próxima.  [place] Prontamente Atención Médica  si algo de lo siguiente ocurre:    Fiebre de 100.4 F (38 C) o más maricarmen, tomada oralmente, que no mejora con los medicamentos.    Dolor nuevo o que va empeorando en los oídos, los senos paranasales o la tonio.    Bultos dolorosos en la parte de atrás del carley.    No puede tragar líquidos ni abrir melanie la boca debido al dolor de garganta.    Tiene problemas para respirar o hace ruidos al respirar.    Disfonía (cambios en la voz).    Ratna nueva erupción cutánea (salpullido).    [id number] [employer]. [address] [place] [id number]. Todos los derechos reservados. Esta información no pretende sustituir la atención médica profesional. Sólo rodgers médico puede diagnosticar y tratar un problema de reno.

## 2017-03-12 NOTE — DISCHARGE INSTRUCTIONS
Get extra rest and drink plenty of fluids. You may take acetaminophen (Tylenol) 650mg or ibuprofen (Advil/Motrin) 600mg, up to every 6 hours, with food, for fevers/aches.     If you were prescribed medications for your symptoms you may use them as instructed.    See your OB/GYN for reevaluation for the vaginal bleeding and infection. See your primary doctor if your symptoms of sore throat are not improving in 2 days.    If you have any worsening/severe symptoms seek medical care right away.       Pharyngitis: Strep (Confirmed)     You have had a positive test for strep throat. Strep throat is a contagious illness. It is spread by coughing, kissing or by touching others after touching your mouth or nose. Symptoms include throat pain which is worse with swallowing, aching all over, headache and fever. It is treated with antibiotic medication. This should help you start to feel better within 1-2 days.  Home care    Rest at home. Drink plenty of fluids to avoid dehydration.    No work or school for the first 2 days of taking the antibiotics. After this time, you will not be contagious. You can then return to school or work if you are feeling better.     The antibiotic medication must be taken for the full 10 days, even if you feel better. This is very important to ensure the infection is treated. It is also important to prevent drug-resistent organisms from developing. If you were given an antibiotic shot, no more antibiotics are needed.    You may use acetaminophen (Tylenol) or ibuprofen (Motrin, Advil) to control pain or fever, unless another medicine was prescribed for this. (NOTE: If you have chronic liver or kidney disease or ever had a stomach ulcer or GI bleeding, talk with your doctor before using these medicines.)    Throat lozenges or sprays (such as Chloraseptic) help reduce pain. Gargling with warm salt water will also reduce throat pain. Dissolve 1/2 teaspoon of salt in 1 glass of warm water. This may be  useful just before meals.     Soft foods are okay. Avoid salty or spicy foods.  Follow-up care  Follow up with your healthcare provider or our staff if you are not improving over the next week.  When to seek medical advice  Call your healthcare provider right away if any of these occur:    Fever as directed by your doctor     New or worsening ear pain, sinus pain, or headache    Painful lumps in the back of neck    Stiff neck    Lymph nodes are getting larger or becoming soft in the middle    Inability to swallow liquids, excessive drooling, or inability to open mouth wide due to throat pain    Signs of dehydration (very dark urine or no urine, sunken eyes, dizziness)    Trouble breathing or noisy breathing    Muffled voice    New rash    3461-6454 [employer]. [address] [place] [address], [place] [address]. All rights reserved. This information is not intended as a substitute for professional medical care. Always follow your healthcare professional's instructions.      Bacterial Vaginosis    You have a vaginal infection called bacterial vaginosis (BV). Both good and bad bacteria are present in a healthy vagina. BV occurs when these bacteria get out of balance. The number of bad bacteria increase. And the number of good bacteria decrease.  BV may or may not cause symptoms. If symptoms do occur, they can include:    Thin, gray, milky-white, or sometimes green discharge    Unpleasant odor or  fishy  smell    Itching, burning, or pain in or around the vagina  It is not known what causes BV, but certain factors can make the problem more likely. This can include:    Douching    Having sex with a new partner    Having sex with more than one partner  BV will sometimes go away on its own. But treatment is usually recommended. This is because untreated BV can increase the risk of more serious health problems such as:    Pelvic inflammatory disease (PID)     delivery (giving birth to a baby early if you re  pregnant)    HIV and certain other sexually transmitted diseases (STDs)    Infection after surgery on the reproductive organs  Home care  General care    BV is most often treated with medicines called antibiotics. These may be given as pills or as a vaginal cream. If antibiotics are prescribed, be sure to use them exactly as directed. Also, be sure to complete all of the medicine, even if your symptoms go away.    Avoid douching or having sex during treatment.    If you have sex with a female partner, ask your healthcare provider if she should also be treated.  Prevention    Limit or avoid douching.    Avoid having sex. If you do have sex, then take steps to lower your risk:    Use condoms when having sex.    Limit the number of partners you have sex with.  Follow-up care  Follow up with your healthcare provider, or as advised.  When to seek medical advice  Call your healthcare provider right away if:    You have a fever of 100.4 F (38 C) or higher, or as directed by your provider.    Your symptoms worsen, or they don t go away within a few days of starting treatment.    You have new pain in the lower belly or pelvic region.    You have side effects that bother you or a reaction to the pills or cream you re prescribed.    You or any partners you have sex with have new symptoms, such as a rash, joint pain, or sores.    8753-2082 [employer]. [address] [place] [address], [place] [address]. All rights reserved. This information is not intended as a substitute for professional medical care. Always follow your healthcare professional's instructions.      Dysfunctional Uterine Bleeding    Dysfunctional uterine bleeding is a condition in which bleeding is abnormal and occurs at unexpected times of the month. This happens due to changes in the hormones that help control a woman s menstrual cycle each month.  The bleeding may be heavier or lighter than normal. If you have heavy bleeding often, this can lead to a  problem called anemia. With anemia, your red blood cell count is too low. Red blood cells are needed because they help carry oxygen throughout your body. Severe anemia may cause you to look pale and feel very weak or tired. You might also become short of breath easily.  To treat dysfunctional uterine bleeding, medicines are often tried first. If these don t help, further testing and treatments may be needed. Discuss all of your options with your provider.  Home care  Medicines  If you re prescribed medicines, be sure to take them as directed. Some of the more common medicines you may be prescribed include:    Hormone therapy (Options include most methods of hormonal birth control such as pills, shots, or a hormone-releasing IUD)    Nonsteroidal anti-inflammatory drugs (NSAIDs), such as ibuprofen    Iron supplements, if you have anemia     General care    Get plenty of rest if you tire easily. Avoid heavy exertion.    To help relieve pain or cramping that may occur with bleeding, try using a heating pad on the lower belly or back. A warm bath may also help.  Follow-up care  Follow up with your healthcare provider as directed.  When to seek medical advice  Call your healthcare provider right away if:    Bleeding becomes heavy (soaking 1 pad or tampon every hour for 3 hours)    Increased abdominal pain    Irregular bleeding worsens or does not get better even with treatment    Fever of 100.4 F (38 C) or higher, or as directed by your provider    Signs of anemia, such as pale skin, extreme fatigue or weakness, or shortness of breath    Dizziness or fainting       6693-0085 [employer]. [address] [place] [address], [place] [address]. All rights reserved. This information is not intended as a substitute for professional medical care. Always follow your healthcare professional's instructions.

## 2017-03-12 NOTE — ED NOTES
pt had implant placed in sept, pt then had continuous bleeding had implant removed in january. since has continued to spot, pt also noted some smelly discharge. pt got zofran for nausea and fenta nyl for back and shoulder pain.

## 2017-03-12 NOTE — ED PROVIDER NOTES
History     Chief Complaint:  Fever      HPI   Stacia Hannon is a 21 year old female who presents with concerns about fever, back pain, and multiple other symptoms.  She reports that about two days ago she developed a mildly productive cough with red sputum and a sore throat.  Today, she noted a fever and a left lower back ache and left upper quadrant abdominal pain.  For the past two months she's been having spotting after removal of implantable birth control, but today she noted passage of clots with a foul odor.  She states she's been having regular periods including one at the beginning of this month. She denies any pelvic pain.  She is in a monogamous relationship with one male partner and has had no other genital lesions or other vaginal discharge.  Review of systems, she has any dysuria, hematuria, urinary urgency, diarrhea, or other symptoms.    Allergies:  No Known Allergies     Medications:      Pseudoeph-Doxylamine-DM-APAP (NYQUIL PO)   clindamycin (CLEOCIN) 300 MG capsule   ibuprofen (ADVIL,MOTRIN) 600 MG tablet   HYDROcodone-acetaminophen (NORCO) 5-325 MG per tablet   sucralfate (CARAFATE) 1 GM/10ML suspension       Problem List:    Patient Active Problem List    Diagnosis Date Noted     Vaginal delivery 2014     Priority: Medium     Pancreatitis 2014        Past Medical History:    Past Medical History   Diagnosis Date     Anemia complicating pregnancy in third trimester 2014      (normal spontaneous vaginal delivery) 2010     Pancreatitis Aug, 2014     Vaginal delivery 2014       Past Surgical History:    Past Surgical History   Procedure Laterality Date     Laparoscopic cholecystectomy with cholangiograms N/A 2014     Procedure: LAPAROSCOPIC CHOLECYSTECTOMY WITH CHOLANGIOGRAMS;  Surgeon: Sheldon Watts MD;  Location:  OR       Family History:    No family history on file.    Social History:  Marital Status:  Single [1]  Social History   Substance Use  "Topics     Smoking status: Never Smoker     Smokeless tobacco: Never Used     Alcohol use No        Review of Systems  Unless as noted, 10 point review systems is otherwise negative        Physical Exam   First Vitals:  BP: 123/80  Pulse: 129  Heart Rate: 129  Temp: 101.4  F (38.6  C)  Resp: 22  SpO2: 97 %    Physical Exam  VITAL SIGNS: /71  Pulse 91  Temp 98.3  F (36.8  C) (Oral)  Resp 18  Ht 1.6 m (5' 3\")  Wt 85.6 kg (188 lb 11.4 oz)  LMP 02/10/2017  SpO2 98%  Breastfeeding? No  BMI 33.43 kg/m2   Constitutional:  Well developed, Well nourished, very anxious   HENT:  Bilateral external ears normal, Mucous membranes moist, Nose normal. Neck- Normal range of motion, Supple.  Posterior pharynx with erythema, no edema or exudate, uvula midline  Respiratory:  Normal breath sounds, No respiratory distress, No wheezing,  Cardiovascular:  Normal heart rate, Normal rhythm, No murmurs,    GI:  Bowel sounds normal, Soft, Nondistended, mild left upper quadrant tenderness, no rebound or guarding  RN/EDT present for entire exam.  External exam: no lesions, no erythema/cellulitis, otherwise unremarkable external genitalia  Internal exam: Normal introitus.   No vaginal discharge noted.  Mildly foul odor  Trace blood noted.  Bimanual exam:    no Cervical motion tenderness.      Musculoskeletal:  Intact distal pulses, No edema, grossly unremarkable range of motion   Integument:  Warm, Dry   Neurologic:  Alert, attentive and appropriately oriented  Psychiatric:  Very anxious        Emergency Department Course     Imaging:  Results for orders placed or performed during the hospital encounter of 03/11/17   XR Chest 2 Views    Narrative    CHEST TWO VIEW   3/11/2017 9:42 PM     HISTORY: Fever.    COMPARISON: 9/25/2016      Impression    IMPRESSION: No acute cardiopulmonary disease.    ROM RUBY MD         Impression & Plan    Medical Decision Making:  Stacia Hannon is a 21 year old female who presents for evaluation of a fever " and multiple other symptoms.    Her symptoms included sore throat and she had clinical evidence of pharyngitis.  The rapid strep test is positive. There is no clinical evidence of peritonsillar abscess, retropharyngeal abscess, Lemierre's Syndrome, epiglottis, or Trevor's angina. The patient's symptoms are consistent with streptococcal pharyngitis.  I have recommended treatment with antibiotics (clinda for BV coverage) and analgesics.  Return if increasing pain, change in voice, neck pain, vomiting, fever, or shortness of breath. Follow-up with primary physician if not improving in 3-5 days.    She also presented for evaluation of foul smelling irregular vaginal bleeding. The physical exam is benign and I doubt a serious underlying cause such as PID, pelvic emergencies, etc. Wet prep confirms BV. Will start clinda. Follow up with gynecology.     She also complained of left upper quadrant abdominal pain, and a broad differential was considered including pneumonia or referred pain.  Labs did not suggest a specific cause for intra-abdominal source, and although patient rated her pain as 7, she had no tenderness on reexamination.  This appeared likely to be more related to anxiety and myalgias from her fever, and unlikely to be an acute abdominal process given her exam, therefore no further imaging was done.      Overall, she was feeling improved with no abdominal pain on reexam, and agreed with plan of treatment and close follow-up.    Diagnosis:    ICD-10-CM    1. Streptococcal sore throat J02.0    2. Bacterial vaginitis N76.0     B96.89        Disposition:  discharged to home with OB/GYN and PCP follow-up    Discharge Medications:  Discharge Medication List as of 3/11/2017 11:45 PM      START taking these medications    Details   clindamycin (CLEOCIN) 300 MG capsule Take 1 capsule (300 mg) by mouth 3 times daily for 10 days, Disp-30 capsule, R-0, Local Print               Korin Hou  3/11/2017   [facility]  [facility] EMERGENCY DEPARTMENT       Korin Hou MD  03/12/17 0116

## 2017-03-12 NOTE — ED NOTES
Medications reviewed with pt, instructions on how to take given. Opportunity for questions given.

## 2017-03-13 LAB
BACTERIA SPEC CULT: NORMAL
Lab: NORMAL
MICRO REPORT STATUS: NORMAL
SPECIMEN SOURCE: NORMAL

## 2017-03-17 LAB
BACTERIA SPEC CULT: NO GROWTH
Lab: NORMAL
MICRO REPORT STATUS: NORMAL
SPECIMEN SOURCE: NORMAL

## 2017-03-18 LAB
BACTERIA SPEC CULT: NO GROWTH
MICRO REPORT STATUS: NORMAL
SPECIMEN SOURCE: NORMAL

## 2017-05-13 ENCOUNTER — HOSPITAL ENCOUNTER (EMERGENCY)
Facility: CLINIC | Age: 22
Discharge: HOME | End: 2017-05-14
Attending: EMERGENCY MEDICINE | Admitting: EMERGENCY MEDICINE

## 2017-05-13 ENCOUNTER — APPOINTMENT (OUTPATIENT)
Dept: ULTRASOUND IMAGING | Facility: CLINIC | Age: 22
End: 2017-05-13
Attending: EMERGENCY MEDICINE

## 2017-05-13 DIAGNOSIS — O20.0 THREATENED MISCARRIAGE: ICD-10-CM

## 2017-05-13 LAB
ABO + RH BLD: NORMAL
ABO + RH BLD: NORMAL
ANION GAP SERPL CALCULATED.3IONS-SCNC: 9 MMOL/L (ref 3–14)
B-HCG SERPL-ACNC: 6687 IU/L (ref 0–5)
BASOPHILS # BLD AUTO: 0 10E9/L (ref 0–0.2)
BASOPHILS NFR BLD AUTO: 0.4 %
BLD GP AB SCN SERPL QL: NORMAL
BLOOD BANK CMNT PATIENT-IMP: NORMAL
BUN SERPL-MCNC: 10 MG/DL (ref 7–30)
CALCIUM SERPL-MCNC: 8.8 MG/DL (ref 8.5–10.1)
CHLORIDE SERPL-SCNC: 106 MMOL/L (ref 94–109)
CO2 SERPL-SCNC: 24 MMOL/L (ref 20–32)
CREAT SERPL-MCNC: 0.47 MG/DL (ref 0.52–1.04)
DIFFERENTIAL METHOD BLD: NORMAL
EOSINOPHIL # BLD AUTO: 0.2 10E9/L (ref 0–0.7)
EOSINOPHIL NFR BLD AUTO: 2.3 %
ERYTHROCYTE [DISTWIDTH] IN BLOOD BY AUTOMATED COUNT: 13.1 % (ref 10–15)
GFR SERPL CREATININE-BSD FRML MDRD: ABNORMAL ML/MIN/1.7M2
GLUCOSE SERPL-MCNC: 102 MG/DL (ref 70–99)
HCT VFR BLD AUTO: 37.3 % (ref 35–47)
HGB BLD-MCNC: 12.2 G/DL (ref 11.7–15.7)
IMM GRANULOCYTES # BLD: 0 10E9/L (ref 0–0.4)
IMM GRANULOCYTES NFR BLD: 0.3 %
LYMPHOCYTES # BLD AUTO: 2.6 10E9/L (ref 0.8–5.3)
LYMPHOCYTES NFR BLD AUTO: 35.9 %
MCH RBC QN AUTO: 30.2 PG (ref 26.5–33)
MCHC RBC AUTO-ENTMCNC: 32.7 G/DL (ref 31.5–36.5)
MCV RBC AUTO: 92 FL (ref 78–100)
MICRO REPORT STATUS: NORMAL
MONOCYTES # BLD AUTO: 0.5 10E9/L (ref 0–1.3)
MONOCYTES NFR BLD AUTO: 7 %
NEUTROPHILS # BLD AUTO: 3.9 10E9/L (ref 1.6–8.3)
NEUTROPHILS NFR BLD AUTO: 54.1 %
NRBC # BLD AUTO: 0 10*3/UL
NRBC BLD AUTO-RTO: 0 /100
PLATELET # BLD AUTO: 296 10E9/L (ref 150–450)
POTASSIUM SERPL-SCNC: 3.6 MMOL/L (ref 3.4–5.3)
RBC # BLD AUTO: 4.04 10E12/L (ref 3.8–5.2)
SODIUM SERPL-SCNC: 139 MMOL/L (ref 133–144)
SPECIMEN EXP DATE BLD: NORMAL
SPECIMEN SOURCE: NORMAL
WBC # BLD AUTO: 7.3 10E9/L (ref 4–11)
WET PREP SPEC: NORMAL

## 2017-05-13 PROCEDURE — 86901 BLOOD TYPING SEROLOGIC RH(D): CPT | Performed by: EMERGENCY MEDICINE

## 2017-05-13 PROCEDURE — 99284 EMERGENCY DEPT VISIT MOD MDM: CPT | Mod: 25

## 2017-05-13 PROCEDURE — 25000128 H RX IP 250 OP 636: Performed by: EMERGENCY MEDICINE

## 2017-05-13 PROCEDURE — 76801 OB US < 14 WKS SINGLE FETUS: CPT

## 2017-05-13 PROCEDURE — 85025 COMPLETE CBC W/AUTO DIFF WBC: CPT | Performed by: EMERGENCY MEDICINE

## 2017-05-13 PROCEDURE — 87591 N.GONORRHOEAE DNA AMP PROB: CPT | Performed by: EMERGENCY MEDICINE

## 2017-05-13 PROCEDURE — 81001 URINALYSIS AUTO W/SCOPE: CPT | Performed by: EMERGENCY MEDICINE

## 2017-05-13 PROCEDURE — 86900 BLOOD TYPING SEROLOGIC ABO: CPT | Performed by: EMERGENCY MEDICINE

## 2017-05-13 PROCEDURE — 87210 SMEAR WET MOUNT SALINE/INK: CPT | Performed by: EMERGENCY MEDICINE

## 2017-05-13 PROCEDURE — 80048 BASIC METABOLIC PNL TOTAL CA: CPT | Performed by: EMERGENCY MEDICINE

## 2017-05-13 PROCEDURE — 86850 RBC ANTIBODY SCREEN: CPT | Performed by: EMERGENCY MEDICINE

## 2017-05-13 PROCEDURE — 87491 CHLMYD TRACH DNA AMP PROBE: CPT | Performed by: EMERGENCY MEDICINE

## 2017-05-13 PROCEDURE — 84702 CHORIONIC GONADOTROPIN TEST: CPT | Performed by: EMERGENCY MEDICINE

## 2017-05-13 RX ORDER — KETOROLAC TROMETHAMINE 30 MG/ML
30 INJECTION, SOLUTION INTRAMUSCULAR; INTRAVENOUS ONCE
Status: COMPLETED | OUTPATIENT
Start: 2017-05-13 | End: 2017-05-13

## 2017-05-13 RX ADMIN — KETOROLAC TROMETHAMINE 30 MG: 30 INJECTION, SOLUTION INTRAMUSCULAR at 22:01

## 2017-05-13 ASSESSMENT — ENCOUNTER SYMPTOMS: ABDOMINAL PAIN: 1

## 2017-05-13 NOTE — ED AVS SNAPSHOT
[facility] Emergency Department    [address]    [facility] [address]    Phone:  [phone]    Fax:  [phone]                                       Stacia Hannon   MRN: [id number]    Department:  [facility] Emergency Department   Date of Visit:  5/13/2017           Patient Information     Date Of Birth          1995        Your diagnoses for this visit were:     Threatened miscarriage        You were seen by Rachael Weathers MD.      Follow-up Information     Follow up with [facility] Emergency Department.    Specialty:  EMERGENCY MEDICINE    Why:  immediately , If symptoms worsen    Contact information:    [address]  [facility] [id number] [phone]        Follow up with [facility], [place].    Specialty:  Family Practice    Why:  As needed    Contact information:    [address] [place] [address]  [phone]          Follow up with Your obstetrician In 5 days.        Discharge Instructions         Posible aborto espontáneo (amenaza de aborto)  Podría estar teniendo un aborto espontáneo.  Los signos comunes de un aborto espontáneo son dolor y sangrado. Algo de sangrado puede ser normal en los primeros ashli meses del embarazo. Con frecuencia el dolor y el sangrado desaparecen y usted puede tener un embarazo y un bebé normales. Kassie un sangrado abundante o robin muy mercy pueden ser las primeras señales de un aborto espontáneo. Un  aborto espontáneo  significa ratna pérdida inesperada de rodgers embarazo.  En corry momento, no sabemos si tendrá un aborto espontáneo o si las cosas se solucionarán y rodgers embarazo continuará de manera normal. Entendemos que esto es emocionalmente difícil. Es muy poco lo que podemos decir respecto de la forma en que se siente. Kassie debe saber que los abortos espontáneos son algo común.  Alrededor de rupert o dos de cada savannah embarazos terminan de corry modo. Algunos terminan incluso antes  de que usted sepa que está embarazada. [place] suele suceder por diferentes razones, y, por lo general, nunca se conocen las causas exactas. Es importante que sepa que no es rodgers culpa. No se debe a que usted haya hecho algo mal.  Tener relaciones sexuales o hacer actividad física no son cosas que provoquen un aborto espontáneo. Estas actividades suelen ser seguras a menos que sienta dolor o tenga sangrado, o que rodgers médico le diga que no lo elinor. Incluso las caídas menores no causan un aborto espontáneo. Los abortos espontáneos suceden cuando las cosas no se desarrollan ericka deberían haberlo hecho. No hay ningún medicamento que pueda prevenir un aborto espontáneo.  Nuevamente, debe entender que en corry momento no se sabe con seguridad. Puede que siga teniendo algo de sangrado. Pueden ser un manchado suave o parecerse a rodgers período. También puede que despida algo de tejido. Puede que tenga algunos robin. Por eso, es importante que elinor visitas de control.  Cuidados en la casa  Para tener más probabilidades que corry embarazo siga normalmente, debe hacer lo siguiente:    Descanse en la cama hasta que el dolor y el sangrado se detengan.    No tenga relaciones sexuales hasta que rodgers proveedor de atención médica lo autorice.    Use toallitas sanitarias en lugar de tampones.    No tome duchas vaginales.    No tome aspirina, ibuprofeno ni naproxeno.    No tome bebidas alcohólicas ni bebidas con cafeína. Tampoco fume.  Visita de control  Elinor ratna reema con rodgers médico para la semana próxima o según le indique nuestro personal médico.  Nota: Si le hicieron ratna ecografía, la evaluará un radiólogo. Le informarán de los nuevos hallazgos que puedan afectar la atención médica que necesita.  Llame al 911  Llame al 911 si ocurre algo de lo siguiente:    Tiene dolor christie y sangrado muy abundante    Se siente muy aturdida o se desmaya    Ritmo cardíaco acelerado    Dificultades para respirar    Confusión o dificultades para  despertarse   Cuándo debe buscar atención médica?  Llame a rodgers proveedor de atención médica de inmediato si sucede cualquiera de las siguientes cosas:    Sangrado vaginal o dolor deep más de ashli días    Sangrado abundante. [place] significa que empapa ratna toallita sanitaria nueva por hora deep ashli horas    Fiebre de 100.4  F (38  C) o más, o según le haya indicado rodgers proveedor de atención médica    Dolor en la parte baja del abdomen que va en aumento    Debilidad o mareo    Despide cualquier cosa que parezca tejido. Podría ser un material sólido o tipo membrana de color moreira o grisáceo. Guarde el tejido en un recipiente limpio y lléveselo a rodgers proveedor    [id number] [employer]. [address] [place] [address]. Todos los derechos reservados. Esta información no pretende sustituir la atención médica profesional. Sólo rodgers médico puede diagnosticar y tratar un problema de reno.          24 Hour Appointment Hotline       To make an appointment at any [facility], call [phone] ([phone]). If you don't have a family doctor or clinic, we will help you find one. [facility] are conveniently located to serve the needs of you and your family.             Review of your medicines      Notice     You have not been prescribed any medications.            Procedures and tests performed during your visit     ABO/Rh type and screen    Basic metabolic panel    CBC with platelets differential    Chlamydia trachomatis PCR    HCG quantitative pregnancy    Neisseria gonorrhoea PCR    Peripheral IV catheter    Prep for procedure - pelvic exam    US OB <14 Weeks W Transvaginal    Wet prep      Orders Needing Specimen Collection     Ordered          05/13/17 2121  UA with Microscopic reflex to Culture - STAT, Prio: STAT, Needs to be Collected     Scheduled Task Status   05/13/17 2121 Collect UA with Microscopic reflex to Culture Open   Order Class:  PCU Collect                  Pending  Results     Date and Time Order Name Status Description    5/13/2017 2121 Neisseria gonorrhoea PCR In process     5/13/2017 2121 Chlamydia trachomatis PCR In process             Pending Culture Results     Date and Time Order Name Status Description    5/13/2017 2121 Neisseria gonorrhoea PCR In process     5/13/2017 2121 Chlamydia trachomatis PCR In process             Pending Results Instructions     If you had any lab results that were not finalized at the time of your Discharge, you can call the ED Lab Result RN at [phone]. You will be contacted by this team for any positive Lab results or changes in treatment. The nurses are available 7 days a week from 10A to 6:30P.  You can leave a message 24 hours per day and they will return your call.        Test Results From Your Hospital Stay        5/13/2017 10:01 PM      Component Results     Component Value Ref Range & Units Status    WBC 7.3 4.0 - 11.0 10e9/L Final    RBC Count 4.04 3.8 - 5.2 10e12/L Final    Hemoglobin 12.2 11.7 - 15.7 g/dL Final    Hematocrit 37.3 35.0 - 47.0 % Final    MCV 92 78 - 100 fl Final    MCH 30.2 26.5 - 33.0 pg Final    MCHC 32.7 31.5 - 36.5 g/dL Final    RDW 13.1 10.0 - 15.0 % Final    Platelet Count 296 150 - 450 10e9/L Final    Diff Method Automated Method  Final    % Neutrophils 54.1 % Final    % Lymphocytes 35.9 % Final    % Monocytes 7.0 % Final    % Eosinophils 2.3 % Final    % Basophils 0.4 % Final    % Immature Granulocytes 0.3 % Final    Nucleated RBCs 0 0 /100 Final    Absolute Neutrophil 3.9 1.6 - 8.3 10e9/L Final    Absolute Lymphocytes 2.6 0.8 - 5.3 10e9/L Final    Absolute Monocytes 0.5 0.0 - 1.3 10e9/L Final    Absolute Eosinophils 0.2 0.0 - 0.7 10e9/L Final    Absolute Basophils 0.0 0.0 - 0.2 10e9/L Final    Abs Immature Granulocytes 0.0 0 - 0.4 10e9/L Final    Absolute Nucleated RBC 0.0  Final         5/13/2017 10:20 PM      Component Results     Component Value Ref Range & Units Status    Sodium 139 133 - 144 mmol/L  Final    Potassium 3.6 3.4 - 5.3 mmol/L Final    Chloride 106 94 - 109 mmol/L Final    Carbon Dioxide 24 20 - 32 mmol/L Final    Anion Gap 9 3 - 14 mmol/L Final    Glucose 102 (H) 70 - 99 mg/dL Final    Urea Nitrogen 10 7 - 30 mg/dL Final    Creatinine 0.47 (L) 0.52 - 1.04 mg/dL Final    GFR Estimate >90  Non  GFR Calc   >60 mL/min/1.7m2 Final    GFR Estimate If Black >90   GFR Calc   >60 mL/min/1.7m2 Final    Calcium 8.8 8.5 - 10.1 mg/dL Final         5/13/2017 11:51 PM      Component Results     Component    ABO    A    RH(D)     Pos    Antibody Screen    Neg    Test Valid Only At    [facility]    Specimen Expires    05/16/2017 5/13/2017 10:14 PM      Component Results     Component    Specimen Description    Vagina    Wet Prep    Few PMNs seen  No yeast seen  No clue cells seen  No Trichomonas seen      Micro Report Status    FINAL 05/13/2017 5/13/2017  9:59 PM         5/13/2017  9:59 PM         5/13/2017 11:13 PM      Narrative     US OB LESS THAN 14 WEEKS WITH TRANSVAGINAL SINGLE  5/13/2017 10:33 PM    HISTORY: Spotting, 6 weeks pregnant.    TECHNIQUE: Transabdominal and endovaginal imaging added to better  visualize gestational sac and adnexa.     COMPARISON:  None available. Patient reported prior outside ultrasound  yesterday.    FINDINGS:      Estimated gestational age by current ultrasound measurement 5 weeks 6  days based on a gestational sac size of 1.1 cm    Crown-rump length: Fetal pole not seen well enough to accurately  measure crown-rump length.  Embryonic cardiac activity: 106 bpm.   Yolk sac: Present.  Subchorionic hemorrhage: Two small areas of subchorionic hemorrhage  adjacent to the right and left sides of the sac approximately 0.5 cm  each in size.    Right ovary: Normal.  Left ovary: Small 1.1 cm corpus luteum cyst.  Adnexal mass: None.  Free pelvic fluid: None.        Impression     IMPRESSION:   1. Early intrauterine pregnancy.  Fetal cardiac activity identified but  fetal pole and crown-rump length not seen well enough to measure.  2. Estimated gestational age based on mean sac diameter 5 weeks and 6  days.  3. Two tiny 0.5 cm subchorionic hemorrhages.    AMAN JORDAN MD         5/13/2017 10:36 PM      Component Results     Component Value Ref Range & Units Status    HCG Quantitative Serum 6687 (H) 0 - 5 IU/L Final    Specimen run with a dilution                Clinical Quality Measure: Blood Pressure Screening     Your blood pressure was checked while you were in the emergency department today. The last reading we obtained was  BP: 108/68 . Please read the guidelines below about what these numbers mean and what you should do about them.  If your systolic blood pressure (the top number) is less than 120 and your diastolic blood pressure (the bottom number) is less than 80, then your blood pressure is normal. There is nothing more that you need to do about it.  If your systolic blood pressure (the top number) is 120-139 or your diastolic blood pressure (the bottom number) is 80-89, your blood pressure may be higher than it should be. You should have your blood pressure rechecked within a year by a primary care provider.  If your systolic blood pressure (the top number) is 140 or greater or your diastolic blood pressure (the bottom number) is 90 or greater, you may have high blood pressure. High blood pressure is treatable, but if left untreated over time it can put you at risk for heart attack, stroke, or kidney failure. You should have your blood pressure rechecked by a primary care provider within the next 4 weeks.  If your provider in the emergency department today gave you specific instructions to follow-up with your doctor or provider even sooner than that, you should follow that instruction and not wait for up to 4 weeks for your follow-up visit.        Thank you for choosing [facility]       Thank you for choosing [facility] for your  "care. Our goal is always to provide you with excellent care. Hearing back from our patients is one way we can continue to improve our services. Please take a few minutes to complete the written survey that you may receive in the mail after you visit with us. Thank you!        [facility] Information     [facility] lets you send messages to your doctor, view your test results, renew your prescriptions, schedule appointments and more. To sign up, go to [email or web address] . Click on \"Log in\" on the left side of the screen, which will take you to the Welcome page. Then click on \"Sign up Now\" on the right side of the page.     You will be asked to enter the access code listed below, as well as some personal information. Please follow the directions to create your username and password.     Your access code is: [email or web address]  Expires: 6/10/2017 12:42 AM     Your access code will  in 90 days. If you need help or a new code, please call your [place] clinic or [phone].        Care EveryWhere ID     This is your Care EveryWhere ID. This could be used by other organizations to access your [facility] medical records  [id number]        After Visit Summary       This is your record. Keep this with you and show to your community pharmacist(s) and doctor(s) at your next visit.                  "

## 2017-05-14 VITALS
TEMPERATURE: 97.8 F | OXYGEN SATURATION: 100 % | HEART RATE: 70 BPM | DIASTOLIC BLOOD PRESSURE: 69 MMHG | RESPIRATION RATE: 16 BRPM | SYSTOLIC BLOOD PRESSURE: 110 MMHG

## 2017-05-14 LAB
ALBUMIN UR-MCNC: 100 MG/DL
APPEARANCE UR: ABNORMAL
BACTERIA #/AREA URNS HPF: ABNORMAL /HPF
BILIRUB UR QL STRIP: NEGATIVE
C TRACH DNA SPEC QL NAA+PROBE: NORMAL
COLOR UR AUTO: ABNORMAL
GLUCOSE UR STRIP-MCNC: NEGATIVE MG/DL
HGB UR QL STRIP: ABNORMAL
KETONES UR STRIP-MCNC: NEGATIVE MG/DL
LEUKOCYTE ESTERASE UR QL STRIP: ABNORMAL
MUCOUS THREADS #/AREA URNS LPF: PRESENT /LPF
N GONORRHOEA DNA SPEC QL NAA+PROBE: NORMAL
NITRATE UR QL: NEGATIVE
PH UR STRIP: 8 PH (ref 5–7)
RBC #/AREA URNS AUTO: >182 /HPF (ref 0–2)
SP GR UR STRIP: 1.03 (ref 1–1.03)
SPECIMEN SOURCE: NORMAL
SPECIMEN SOURCE: NORMAL
SQUAMOUS #/AREA URNS AUTO: 16 /HPF (ref 0–1)
URN SPEC COLLECT METH UR: ABNORMAL
UROBILINOGEN UR STRIP-MCNC: 0 MG/DL (ref 0–2)
WBC #/AREA URNS AUTO: 8 /HPF (ref 0–2)

## 2017-05-14 ASSESSMENT — PAIN DESCRIPTION - DESCRIPTORS: DESCRIPTORS: CRAMPING

## 2017-05-14 NOTE — ED PROVIDER NOTES
History     Chief Complaint:  Vaginal bleeding    HPI   Stacia Hannon is a 21 year old female  who presents with vaginal bleeding. The patient reports she had a positive pregnancy test and subsequent ultrasound with a uterine sac observed but no heart rate. This morning the patient had some bright red blood which gradually turned darker brown. She was concerned which prompts her visit. Patient does have an appointment with OB this next Friday. She has been using tylenol for her cramping. LMP 3/5/17.  She has a 7 year old and 3 year old child, both born as normal spontaneous vaginal deliveries.    Allergies:  The patient has no known allergies to medications.      Medications:    The patient is not currently taking any prescribed medications.     Past Medical History:    Pancreatitis    Past Surgical History:    Cholecystectomy    Family History:    The patient has no pertinent family history.     Social History:  Single.  The patient denies smoking.   The patient denies alcohol consumption.      Review of Systems   Gastrointestinal: Positive for abdominal pain (cramping).   Genitourinary: Positive for vaginal bleeding.   All other systems reviewed and are negative.    Physical Exam   First Vitals:  BP: 108/68  Pulse: 70  Temp: 97.8  F (36.6  C)  Resp: 16  SpO2: 99 %    Physical Exam  Gen: Pleasant, appears stated age.    Eye:   Pupils are equal, round, and reactive.     Sclera non-injected.    ENT:   Moist mucus membranes.     Normal tongue.    Oropharynx without lesions.    Cardiac:     Normal rate and regular rhythm.    No murmurs, gallops, or rubs.    Pulmonary:     Clear to auscultation bilaterally.    No wheezes, rales, or rhonchi.    Abdomen:     Normal active bowel sounds.     Abdomen is soft and non-distended, without focal tenderness.    :  External exam: no lesions, no erythema/cellulitis  Internal exam: Normal introitus.   Red blood noted at the os, minimal, no clots or tissue.  Bimanual exam:    Cervix closed. No Cervical motion tenderness.    Bilateral adnexa non-tender.  Uterus non-tender      Musculoskeletal:     Normal movement of all extremities without evidence for deficit.    Extremities:    No edema.    Skin:   Warm and dry.    Neurologic:    Non-focal exam without asymmetric weakness or numbness.    Normal tone    Psychiatric:     Normal affect with appropriate interaction with examiner.      Emergency Department Course   Imaging:  Radiographic findings were communicated with the patient who voiced understanding of the findings.    US OB <14 weeks w Transvaginal:  1. Early intrauterine pregnancy. Fetal cardiac activity identified but  fetal pole and crown-rump length not seen well enough to measure.  2. Estimated gestational age based on mean sac diameter 5 weeks and 6  days.  3. Two tiny 0.5 cm subchorionic hemorrhages.  Results per radiology.     Laboratory:  CBC: WNL (WBC 7.3, HGB 12.2, )   BMP:  (H), Creat 0.47 (L), ow wnl  Type and screen: A+, antibody negative  HCG quant: 6687 (H)     Wet prep: WNL  Chlamydia PCR: in process  Gonorrhea PCR: in process    Interventions:  Toradol 30 mg IV    Emergency Department Course:  Nursing notes and vitals reviewed.  I performed an exam of the patient as documented above.     The work up above was undertaken.     The patient received the intervention(s) above.     Findings and plan explained to the Patient. Patient discharged home with instructions regarding supportive care, medications, and reasons to return. The importance of close follow-up was reviewed.     Impression & Plan    Medical Decision Making:  Stacia Hannon is a 21 year old female  who presents with vaginal spotting. The patient is approximately 6 weeks by dates. On exam, she has some mild bleeding from the os but I close the os. Her ultrasound demonstrates an potentially viable IUP although heart rate is somewhat low. She does have two small subchorionic hemorrhages.  Her HCG is appropriate for dates. At this point the patient's presentation is consistent with a threatened miscarriage. We discussed she is at increased risk for completing the miscarriage. She does have follow up with OB in 5 days. Otherwise, she will return to the emergency department for worsening of her condition, worsening pain, increased bleeding, or other concerning symptoms. I have recommended pelvis rest and tylenol as needed for pain.     Diagnosis:    ICD-10-CM    1. Threatened miscarriage O20.0        Disposition:  Discharged.    I, Trent Farley, am serving as a scribe at 10:55 PM on 5/13/2017 to document services personally performed by Dr. Weathers, based on my observations and the provider's statements to me.    [facility] EMERGENCY DEPARTMENT       Rachael Weathers MD  05/14/17 3220

## 2017-05-14 NOTE — ED NOTES
Pt c/o vag bleeding and spotting. Stated had u.s. that confirmed pregnancy yesterday but found no heart beat. Has appt with ob next Friday but bleeding today.    6 weeks pregnant.    Pt A&O x 3, CMS x 3, ABCD's adequate in triage

## 2017-05-14 NOTE — DISCHARGE INSTRUCTIONS
Posible aborto espontáneo (amenaza de aborto)  Podría estar teniendo un aborto espontáneo.  Los signos comunes de un aborto espontáneo son dolor y sangrado. Algo de sangrado puede ser normal en los primeros ashli meses del embarazo. Con frecuencia el dolor y el sangrado desaparecen y usted puede tener un embarazo y un bebé normales. Kassie un sangrado abundante o robin muy mercy pueden ser las primeras señales de un aborto espontáneo. Un  aborto espontáneo  significa ratna pérdida inesperada de rodgers embarazo.  En corry momento, no sabemos si tendrá un aborto espontáneo o si las cosas se solucionarán y rodgers embarazo continuará de manera normal. Entendemos que esto es emocionalmente difícil. Es muy poco lo que podemos decir respecto de la forma en que se siente. Kassie debe saber que los abortos espontáneos son algo común.  Alrededor de rupert o dos de cada savannah embarazos terminan de corry modo. Algunos terminan incluso antes de que usted sepa que está embarazada. [place] suele suceder por diferentes razones, y, por lo general, nunca se conocen las causas exactas. Es importante que sepa que no es rodgers culpa. No se debe a que usted haya hecho algo mal.  Tener relaciones sexuales o hacer actividad física no son cosas que provoquen un aborto espontáneo. Estas actividades suelen ser seguras a menos que sienta dolor o tenga sangrado, o que rodgers médico le diga que no lo leodan. Incluso las caídas menores no causan un aborto espontáneo. Los abortos espontáneos suceden cuando las cosas no se desarrollan ericka deberían haberlo hecho. No hay ningún medicamento que pueda prevenir un aborto espontáneo.  Nuevamente, debe entender que en corry momento no se sabe con seguridad. Puede que siga teniendo algo de sangrado. Pueden ser un manchado suave o parecerse a rodgers período. También puede que despida algo de tejido. Puede que tenga algunos robin. Por eso, es importante que leodan visitas de control.  Cuidados en la casa  Para tener más probabilidades que  corry embarazo siga normalmente, debe hacer lo siguiente:    Descanse en la cama hasta que el dolor y el sangrado se detengan.    No tenga relaciones sexuales hasta que rodgers proveedor de atención médica lo autorice.    Use toallitas sanitarias en lugar de tampones.    No tome duchas vaginales.    No tome aspirina, ibuprofeno ni naproxeno.    No tome bebidas alcohólicas ni bebidas con cafeína. Tampoco fume.  Visita de control  Elinor ratna reema con rodgers médico para la semana próxima o según le indique nuestro personal médico.  Nota: Si le hicieron ratna ecografía, la evaluará un radiólogo. Le informarán de los nuevos hallazgos que puedan afectar la atención médica que necesita.  Llame al 911  Llame al 911 si ocurre algo de lo siguiente:    Tiene dolor christie y sangrado muy abundante    Se siente muy aturdida o se desmaya    Ritmo cardíaco acelerado    Dificultades para respirar    Confusión o dificultades para despertarse   Cuándo debe buscar atención médica?  Llame a rogders proveedor de atención médica de inmediato si sucede cualquiera de las siguientes cosas:    Sangrado vaginal o dolor deep más de ashli días    Sangrado abundante. [place] significa que empapa ratna toallita sanitaria nueva por hora deep ashli horas    Fiebre de 100.4  F (38  C) o más, o según le haya indicado rodgers proveedor de atención médica    Dolor en la parte baja del abdomen que va en aumento    Debilidad o mareo    Despide cualquier cosa que parezca tejido. Podría ser un material sólido o tipo membrana de color moreira o grisáceo. Guarde el tejido en un recipiente limpio y lléveselo a rodgers proveedor    [id number] [employer]. [address] [place] [address], [place] [address]. Todos los derechos reservados. Esta información no pretende sustituir la atención médica profesional. Sólo rodgers médico puede diagnosticar y tratar un problema de reno.

## 2017-05-14 NOTE — ED NOTES
Patient continues to have intermittent spotting and abdominal cramping. Alert and oriented. MD in to discuss test results. Patient meets discharge criteria. Discussed AVS with patient. Questions answered. Patient verbalized understanding. Patient reports being ready to go home. Patient discharged home with family by car with all necessary information.

## 2017-10-25 ENCOUNTER — HOSPITAL ENCOUNTER (OUTPATIENT)
Facility: CLINIC | Age: 22
Discharge: HOME | End: 2017-10-25
Attending: OBSTETRICS & GYNECOLOGY | Admitting: OBSTETRICS & GYNECOLOGY
Payer: MEDICAID

## 2017-10-25 VITALS — RESPIRATION RATE: 16 BRPM | TEMPERATURE: 98.2 F | DIASTOLIC BLOOD PRESSURE: 61 MMHG | SYSTOLIC BLOOD PRESSURE: 106 MMHG

## 2017-10-25 LAB
A1 MICROGLOB PLACENTAL VAG QL: NEGATIVE
FIBRONECTIN FETAL VAG QL: NEGATIVE

## 2017-10-25 PROCEDURE — 99214 OFFICE O/P EST MOD 30 MIN: CPT

## 2017-10-25 PROCEDURE — 82731 ASSAY OF FETAL FIBRONECTIN: CPT | Performed by: OBSTETRICS & GYNECOLOGY

## 2017-10-25 PROCEDURE — 99213 OFFICE O/P EST LOW 20 MIN: CPT

## 2017-10-25 PROCEDURE — 84112 EVAL AMNIOTIC FLUID PROTEIN: CPT | Performed by: OBSTETRICS & GYNECOLOGY

## 2017-10-25 RX ORDER — ONDANSETRON 2 MG/ML
4 INJECTION INTRAMUSCULAR; INTRAVENOUS EVERY 6 HOURS PRN
Status: DISCONTINUED | OUTPATIENT
Start: 2017-10-25 | End: 2017-10-26 | Stop reason: HOSPADM

## 2017-10-25 RX ORDER — PRENATAL VIT/IRON FUM/FOLIC AC 27MG-0.8MG
1 TABLET ORAL DAILY
COMMUNITY
End: 2023-02-06

## 2017-10-25 NOTE — IP AVS SNAPSHOT
[facility] Labor and Delivery    [address]    [facility] [address]    Phone:  [phone]    Fax:  [phone]                                       After Visit Summary   10/25/2017    Stacia Hannon    MRN: [id number]           After Visit Summary Signature Page     I have received my discharge instructions, and my questions have been answered. I have discussed any challenges I see with this plan with the nurse or doctor.    ..........................................................................................................................................  Patient/Patient Representative Signature      ..........................................................................................................................................  Patient Representative Print Name and Relationship to Patient    ..................................................               ................................................  Date                                            Time    ..........................................................................................................................................  Reviewed by Signature/Title    ...................................................              ..............................................  Date                                                            Time

## 2017-10-25 NOTE — IP AVS SNAPSHOT
MRN:[id number]                      After Visit Summary   10/25/2017    Stacia Hannon    MRN: [id number]           Thank you!     Thank you for choosing [facility] for your care. Our goal is always to provide you with excellent care. Hearing back from our patients is one way we can continue to improve our services. Please take a few minutes to complete the written survey that you may receive in the mail after you visit. If you would like to speak to someone directly about your visit please contact Patient Relations at [phone]. Thank you!          Patient Information     Date Of Birth          1995        About your hospital stay     You were admitted on:  October 25, 2017 You last received care in the:  [facility]    You were discharged on:  October 25, 2017       Who to Call     For medical emergencies, please call 911.  For non-urgent questions about your medical care, please call your primary care provider or clinic, [phone]          Attending Provider     Provider Zac Gresham MD OB/Gyn       Primary Care Provider Office Phone # Fax #    [facility] [phone]      Further instructions from your care team       Discharge Instruction for Undelivered Patients      You were seen for: Labor Assessment, Membrane Assessment and Fetal Assessment  We Consulted: Dr Hester  You had (Test or Medicine):fetal assessment, cervical exam, labor assessment, amnisure     Diet:   Drink 8 to 12 glasses of liquids (milk, juice, water) every day.  You may eat meals and snacks.     Activity:  Count fetal kicks everyday (see handout)  Call your doctor or nurse midwife if your baby is moving less than usual.     Call your provider if you notice:  Swelling in your face or increased swelling in your hands or legs.  Headaches that are not relieved by Tylenol (acetaminophen).  Changes in your vision (blurring: seeing  "spots or stars.)  Nausea (sick to your stomach) and vomiting (throwing up).   Weight gain of 5 pounds or more per week.  Heartburn that doesn't go away.  Signs of bladder infection: pain when you urinate (use the toilet), need to go more often and more urgently.  The bag of jenkins (rupture of membranes) breaks, or you notice leaking in your underwear.  Bright red blood in your underwear.  Abdominal (lower belly) or stomach pain.  Second (plus) baby: Contractions (tightening) less than 10 minutes apart and getting stronger.  *If less than 34 weeks: Contractions (tightenings) more than 6 times in one hour.  Increase or change in vaginal discharge (note the color and amount)      Follow-up:  Make an appointment to be seen next week.          Pending Results     No orders found from 10/23/2017 to 10/26/2017.            Admission Information     Date & Time Provider Department Dept. Phone    10/25/2017 Zac Hester MD [facility] Labor and Delivery [phone]      Your Vitals Were     Blood Pressure Temperature Respirations Last Period          106/61 98.2  F (36.8  C) (Oral) 16 2017 (Exact Date)        MyChart Information     [employer] lets you send messages to your doctor, view your test results, renew your prescriptions, schedule appointments and more. To sign up, go to [email or web address] . Click on \"Log in\" on the left side of the screen, which will take you to the Welcome page. Then click on \"Sign up Now\" on the right side of the page.     You will be asked to enter the access code listed below, as well as some personal information. Please follow the directions to create your username and password.     Your access code is: [email or web address]  Expires: 2018 11:35 PM     Your access code will  in 90 days. If you need help or a new code, please call your [place] clinic or [phone].        Care EveryWhere ID     This is your Care EveryWhere ID. This could be used by other " organizations to access your [facility] medical records  [id number]        Equal Access to Services     NICHOBRIDGER DALIA : Hadreid Arevalo, waswapnada alexi, qanitza wilsonmajenny acharya, jewels cobosisaíasaditi shah. So [facility] [phone].    ATENCIÓN: Si habla español, tiene a rodgers disposición servicios gratuitos de asistencia lingüística. Llame al [phone].    We comply with applicable federal civil rights laws and [place] laws. We do not discriminate on the basis of race, color, national origin, age, disability, sex, sexual orientation, or gender identity.               Review of your medicines      UNREVIEWED medicines. Ask your doctor about these medicines        Dose / Directions    prenatal multivitamin plus iron 27-0.8 MG Tabs per tablet        Dose:  1 tablet   Take 1 tablet by mouth daily   Refills:  0                Protect others around you: Learn how to safely use, store and throw away your medicines at www.disposemymeds.org.             Medication List: This is a list of all your medications and when to take them. Check marks below indicate your daily home schedule. Keep this list as a reference.      Medications           Morning Afternoon Evening Bedtime As Needed    prenatal multivitamin plus iron 27-0.8 MG Tabs per tablet   Take 1 tablet by mouth daily                                          More Information        Kick Counts  It s normal to worry about your baby s health. One way you can know your baby s doing well is to record the baby s movements once a day. This is called a kick count. You will usually feel your baby move by the 20th week of pregnancy. Remember to take your kick count records to all your appointments with your healthcare provider.       How to Count Kicks    Choose a time when the baby is active, such as after a meal.     Sit comfortably or lie on your side.     The first time the baby moves, write down the time.     Count each movement until the baby  has moved 10 times. This can take from 20 minutes to 2 hours.     If the baby hasn t moved 4 times in 1 hour, pat your stomach to wake the baby up.    Write down the time you feel the baby s 10th movement.    Try to do it at the same time each day.  When to Call Your Healthcare Provider  Call your healthcare provider right away if you notice any of the following:    Your baby moves fewer than 10 times in 2 hours while you re doing kick counts.    Your baby moves much less often than on the days before.    You have not felt your baby move all day.    2939-0407 [employer]. [address] [place] [address]. All rights reserved. This information is not intended as a substitute for professional medical care. Always follow your healthcare professional's instructions.  This information has been modified by your health care provider with permission from the publisher.            Premature Labor    Premature labor ( labor) is when symptoms of labor occur before 37 weeks of pregnancy. (This is 3 weeks before your due date.) Premature labor can lead to premature delivery. This means giving birth to your baby early. Babies need at least 37 weeks of pregnancy for all the organs to develop normally. The earlier the delivery, the greater the risks to the baby.  In most cases, the cause of premature labor is unknown. But certain factors may make the problem more likely. These include:    History of premature labor with other pregnancies    Smoking    Alcohol or substance abuse    Low pre-pregnancy weight or weight gain during pregnancy    Short time period between pregnancies    Being pregnant with twins, triplets, or more    History of certain types of surgery on the cervix or uterus    Having a short cervix    Certain infections  There are a number of other risk factors. Ask your healthcare provider to help you understand the risk factors specific to your case. Then find out what you can do to  control or reduce them.  Contractions are one of the main signs of premature labor. A contraction is different from cramping. It may feel painful and the belly (abdomen) may get hard. It can last from a few seconds to a few minutes. Some women may feel only a sense of pressure in the belly, thighs, rectum, or vagina. Some may feel only the hardening of the uterus without pain or pressure. Or there may be a constant pain the lower back, which spreads forward toward the belly.    Premature labor can often be treated with medicines. A hospital stay will likely be needed. If labor is stopped successfully and you and your baby are both healthy, you may be discharged to continue care at home.  Home care    Ask your provider any questions you have. Be certain you understand how to care for yourself at home. Also follow all recommendations given by your healthcare providers.    Learn the signs of premature labor. Watch for these signs when you get home.    Limit or restrict activities as advised. This may include stopping certain physical activities and cutting back hours at work.    Avoid doing any strenuous work. Ask family and friends for help with tasks and support at home, if needed.    Don t smoke, drink alcohol, or use other harmful substances.    Take steps to reduce stress.    Report any unusual symptoms to your provider.  Follow-up care  Follow up with your healthcare provider, or as directed. Weekly visits with your provider may be needed.  When to seek medical advice  Call your healthcare provider right away if any of these occur:    Regular or frequent contractions, whether they are painful or not    Pressure in the pelvis    Pressure in the lower belly or mild cramping in your belly with or without diarrhea    Constant low, dull backache    Gush or slow leaking of water from your vagina    Change in vaginal discharge (watery, mucus, or bloody)    Any vaginal bleeding    Decreased movement of your baby  Date  Last Reviewed: 9/26/2015 2000-2017 [employer] [facility] [facility]. [address] [place] [address], [place] [address]. All rights reserved. This information is not intended as a substitute for professional medical care. Always follow your healthcare professional's instructions.

## 2017-10-26 NOTE — PLAN OF CARE
Patient discharged with instructions after Dr Hester updated.  Patient states she will follow up at clinic next week, and that she understands discharge instructions.

## 2017-10-26 NOTE — PLAN OF CARE
at 29.4 weeks gestation arrives to floor for rule out rupture. Pt states she noticed small amounts of clear fluid leaking last night and has started to contract today around 1100. Pt states she was working today or she would have been here sooner. States she has felt baby moving today and denies any vaginal bleeding. Monitors applied, assessment done and admission information gathered. Will call MD for further orders.

## 2017-10-26 NOTE — DISCHARGE INSTRUCTIONS
Discharge Instruction for Undelivered Patients      You were seen for: Labor Assessment, Membrane Assessment and Fetal Assessment  We Consulted: Dr Hester  You had (Test or Medicine):fetal assessment, cervical exam, labor assessment, amnisure     Diet:   Drink 8 to 12 glasses of liquids (milk, juice, water) every day.  You may eat meals and snacks.     Activity:  Count fetal kicks everyday (see handout)  Call your doctor or nurse midwife if your baby is moving less than usual.     Call your provider if you notice:  Swelling in your face or increased swelling in your hands or legs.  Headaches that are not relieved by Tylenol (acetaminophen).  Changes in your vision (blurring: seeing spots or stars.)  Nausea (sick to your stomach) and vomiting (throwing up).   Weight gain of 5 pounds or more per week.  Heartburn that doesn't go away.  Signs of bladder infection: pain when you urinate (use the toilet), need to go more often and more urgently.  The bag of jenkins (rupture of membranes) breaks, or you notice leaking in your underwear.  Bright red blood in your underwear.  Abdominal (lower belly) or stomach pain.  Second (plus) baby: Contractions (tightening) less than 10 minutes apart and getting stronger.  *If less than 34 weeks: Contractions (tightenings) more than 6 times in one hour.  Increase or change in vaginal discharge (note the color and amount)      Follow-up:  Make an appointment to be seen next week.

## 2017-10-26 NOTE — PROVIDER NOTIFICATION
10/25/17 2225   Provider Notification   Provider Name/Title Dr Hester   Method of Notification At Bedside   Request Evaluate in Person     MD at bedside. Orders for amnisure, FFN and SVE.

## 2017-10-26 NOTE — PROVIDER NOTIFICATION
10/25/17 5951   Provider Notification   Provider Name/Title Dr Hester   Method of Notification Phone   Request Evaluate - Remote   Notification Reason Lab/Diagnostic Study     Dr Hester notified of negative amnisure and negative fetal fibronectin.  No need for fern test at this time.  Infant still very active, unable to monitor fetal heart rate.  Audible fhr 120-140 while at bedside.  Plan to discharge home and have patient follow up with provider next week.

## 2017-10-26 NOTE — PLAN OF CARE
Report received from Penny YEUNG. Will plan to assume cares and update Dr Hester when results are back.

## 2017-10-26 NOTE — PROVIDER NOTIFICATION
10/25/17 2241   Provider Notification   Provider Name/Title Dr Hester   Method of Notification At Bedside   Request Evaluate in Person     MD at bedside for bedside ultrasound for fluid check. Fluid pockets found. Call with lab results.

## 2017-12-04 ENCOUNTER — HOSPITAL ENCOUNTER (OUTPATIENT)
Facility: CLINIC | Age: 22
Discharge: HOME | End: 2017-12-05
Attending: OBSTETRICS & GYNECOLOGY | Admitting: OBSTETRICS & GYNECOLOGY
Payer: MEDICAID

## 2017-12-04 PROCEDURE — 86900 BLOOD TYPING SEROLOGIC ABO: CPT | Performed by: OBSTETRICS & GYNECOLOGY

## 2017-12-04 PROCEDURE — 96361 HYDRATE IV INFUSION ADD-ON: CPT

## 2017-12-04 PROCEDURE — 86901 BLOOD TYPING SEROLOGIC RH(D): CPT | Performed by: OBSTETRICS & GYNECOLOGY

## 2017-12-04 PROCEDURE — 96360 HYDRATION IV INFUSION INIT: CPT

## 2017-12-04 PROCEDURE — 25000132 ZZH RX MED GY IP 250 OP 250 PS 637: Performed by: OBSTETRICS & GYNECOLOGY

## 2017-12-04 PROCEDURE — 86850 RBC ANTIBODY SCREEN: CPT | Performed by: OBSTETRICS & GYNECOLOGY

## 2017-12-04 PROCEDURE — 85460 HEMOGLOBIN FETAL: CPT | Performed by: OBSTETRICS & GYNECOLOGY

## 2017-12-04 PROCEDURE — 85018 HEMOGLOBIN: CPT | Performed by: OBSTETRICS & GYNECOLOGY

## 2017-12-04 PROCEDURE — 25000128 H RX IP 250 OP 636: Performed by: OBSTETRICS & GYNECOLOGY

## 2017-12-04 RX ORDER — SODIUM CHLORIDE, SODIUM LACTATE, POTASSIUM CHLORIDE, CALCIUM CHLORIDE 600; 310; 30; 20 MG/100ML; MG/100ML; MG/100ML; MG/100ML
INJECTION, SOLUTION INTRAVENOUS CONTINUOUS
Status: DISCONTINUED | OUTPATIENT
Start: 2017-12-04 | End: 2017-12-05 | Stop reason: HOSPADM

## 2017-12-04 RX ORDER — ACETAMINOPHEN 325 MG/1
975 TABLET ORAL EVERY 6 HOURS PRN
Status: DISCONTINUED | OUTPATIENT
Start: 2017-12-04 | End: 2017-12-05 | Stop reason: HOSPADM

## 2017-12-04 RX ORDER — CYCLOBENZAPRINE HCL 10 MG
10 TABLET ORAL 3 TIMES DAILY PRN
Status: DISCONTINUED | OUTPATIENT
Start: 2017-12-04 | End: 2017-12-05 | Stop reason: HOSPADM

## 2017-12-04 RX ORDER — ONDANSETRON 4 MG/1
4 TABLET, ORALLY DISINTEGRATING ORAL EVERY 6 HOURS PRN
Status: DISCONTINUED | OUTPATIENT
Start: 2017-12-04 | End: 2017-12-05 | Stop reason: HOSPADM

## 2017-12-04 RX ADMIN — SODIUM CHLORIDE, POTASSIUM CHLORIDE, SODIUM LACTATE AND CALCIUM CHLORIDE: 600; 310; 30; 20 INJECTION, SOLUTION INTRAVENOUS at 22:32

## 2017-12-04 RX ADMIN — CYCLOBENZAPRINE HYDROCHLORIDE 10 MG: 10 TABLET, FILM COATED ORAL at 23:29

## 2017-12-04 RX ADMIN — ACETAMINOPHEN 975 MG: 325 TABLET, FILM COATED ORAL at 22:43

## 2017-12-04 NOTE — IP AVS SNAPSHOT
MRN:[id number]                      After Visit Summary   12/4/2017    Stacia Hannon    MRN: [id number]           Thank you!     Thank you for choosing [facility] for your care. Our goal is always to provide you with excellent care. Hearing back from our patients is one way we can continue to improve our services. Please take a few minutes to complete the written survey that you may receive in the mail after you visit. If you would like to speak to someone directly about your visit please contact Patient Relations at [phone]. Thank you!          Patient Information     Date Of Birth          1995        About your hospital stay     You were admitted on:  December 4, 2017 You last received care in the:  [facility] Labor and Delivery    You were discharged on:  December 5, 2017       Who to Call     For medical emergencies, please call 911.  For non-urgent questions about your medical care, please call your primary care provider or clinic, [phone]          Attending Provider     Provider Specialty    Shivani Gupta DO OB/Gyn    Dionte Escobar MD OB/Gyn       Primary Care Provider Office Phone # Fax #    [facility] [phone]      Further instructions from your care team       Discharge Instruction for Undelivered Patients      You were seen for: Fetal Assessment and pain from fall  We Consulted: Dr Escobar  You had (Test or Medicine): [facility] lab with blood type and screen.      Diet:   Drink 8 to 12 glasses of liquids (milk, juice, water) every day.  You may eat meals and snacks.     Activity:  Count fetal kicks everyday (see handout)  Call your doctor or nurse midwife if your baby is moving less than usual.     Call your provider if you notice:  Swelling in your face or increased swelling in your hands or legs.  Headaches that are not relieved by Tylenol (acetaminophen).  Changes in your vision (blurring: seeing spots  "or stars.)  Nausea (sick to your stomach) and vomiting (throwing up).   Weight gain of 5 pounds or more per week.  Heartburn that doesn't go away.  Signs of bladder infection: pain when you urinate (use the toilet), need to go more often and more urgently.  The bag of jenkins (rupture of membranes) breaks, or you notice leaking in your underwear.  Bright red blood in your underwear.  Abdominal (lower belly) or stomach pain.  For first baby: Contractions (tightening) less than 5 minutes apart for one hour or more.  Second (plus) baby: Contractions (tightening) less than 10 minutes apart and getting stronger.  *If less than 34 weeks: Contractions (tightenings) more than 6 times in one hour.  Increase or change in vaginal discharge (note the color and amount)      Follow-up:  As scheduled in the clinic in 3 days for scheduled appt.           Pending Results     No orders found for last 3 day(s).            Admission Information     Date & Time Provider Department Dept. Phone    2017 Dionte Escobar MD [facility] Labor and Delivery [phone]      Your Vitals Were     Blood Pressure Pulse Temperature Respirations Height Weight    119/63 86 98.6  F (37  C) (Oral) 16 1.6 m (5' 3\") 87.1 kg (192 lb)    Last Period Pulse Oximetry BMI (Body Mass Index)             2017 (Exact Date) 97% 34.01 kg/m2         MyChart Information     [employer] lets you send messages to your doctor, view your test results, renew your prescriptions, schedule appointments and more. To sign up, go to [email or web address] . Click on \"Log in\" on the left side of the screen, which will take you to the Welcome page. Then click on \"Sign up Now\" on the right side of the page.     You will be asked to enter the access code listed below, as well as some personal information. Please follow the directions to create your username and password.     Your access code is: [email or web address]  Expires: 2018 10:35 PM     Your access code will  " in 90 days. If you need help or a new code, please call your [place] clinic or [phone].        Care EveryWhere ID     This is your Care EveryWhere ID. This could be used by other organizations to access your [place] medical records  [id number]        Equal Access to Services     JONAH DALIA AH: Hadii anton summers hadasho Soomaali, waaxda luqadaha, qaybta kaalmada adeegyada, waxmorenita lynhc hayaan adegeorgia khisaíassh la'eulalio shah. So [facility] [phone].    ATENCIÓN: Si habla español, tiene a rodgers disposición servicios gratuitos de asistencia lingüística. Llame al [phone].    We comply with applicable federal civil rights laws and [place] laws. We do not discriminate on the basis of race, color, national origin, age, disability, sex, sexual orientation, or gender identity.               Review of your medicines      UNREVIEWED medicines. Ask your doctor about these medicines        Dose / Directions    prenatal multivitamin plus iron 27-0.8 MG Tabs per tablet        Dose:  1 tablet   Take 1 tablet by mouth daily   Refills:  0                Protect others around you: Learn how to safely use, store and throw away your medicines at www.disposemymeds.org.             Medication List: This is a list of all your medications and when to take them. Check marks below indicate your daily home schedule. Keep this list as a reference.      Medications           Morning Afternoon Evening Bedtime As Needed    prenatal multivitamin plus iron 27-0.8 MG Tabs per tablet   Take 1 tablet by mouth daily                                          More Information        Recuento de patadas  Es normal que le preocupe la reno de rodgers bebé. Para saber si el bebé está melanie, usted puede anotar las veces que usted siente candi pataditas en un registro de movimientos todos los días. Normalmente es posible sentir que el bebé se mueve a partir de la semana 20 del embarazo. No olvide llevar los registros de los movimientos del bebé a todas las citas que tenga  con rodgers proveedor de atención médica.     Cómo contar los movimientos    Escoja ratna hora cuando el bebé esté activo, ericka por ejemplo después de ratna comida.    Siéntese cómodamente o acuéstese de lado.    La primera vez que el bebé se mueva, anote la hora.    Cuente cada movimiento hasta que el bebé se haya movido 10 veces. ([place] puede llevar entre 20 minutos y 2 horas.)    Si el bebé no se ha movido 4 veces en 1 hora, dése ratna palmadita en el abdomen para despertarlo.    Anote la hora en que sienta el décimo movimiento del bebé.    Trate de hacer esto a la misma hora todos los días.  Cuándo debe llamar al proveedor de atención médica  Llame a rodgers proveedor de atención médica en el acto en cualquiera de los siguientes casos:     Si el bebé se mueve menos de 10 veces en 1 horas mientras usted está llevando la cuenta de las pataditas.    Si el bebé se mueve con mucha menos frecuencia que en días anteriores.    Si usted no ha sentido movimientos del bebé en todo el día.    1641-2942 [employer]. [address] [place] [address]. All rights reserved. This information is not intended as a substitute for professional medical care. Always follow your healthcare professional's instructions.  This information has been modified by your health care provider with permission from the publisher.

## 2017-12-04 NOTE — IP AVS SNAPSHOT
[facility] Labor and Delivery    [address]    [facility] [address]    Phone:  [phone]    Fax:  [phone]                                       After Visit Summary   12/4/2017    Stacia Hannon    MRN: [id number]           After Visit Summary Signature Page     I have received my discharge instructions, and my questions have been answered. I have discussed any challenges I see with this plan with the nurse or doctor.    ..........................................................................................................................................  Patient/Patient Representative Signature      ..........................................................................................................................................  Patient Representative Print Name and Relationship to Patient    ..................................................               ................................................  Date                                            Time    ..........................................................................................................................................  Reviewed by Signature/Title    ...................................................              ..............................................  Date                                                            Time

## 2017-12-05 VITALS
HEIGHT: 63 IN | TEMPERATURE: 98.6 F | WEIGHT: 192 LBS | SYSTOLIC BLOOD PRESSURE: 119 MMHG | RESPIRATION RATE: 16 BRPM | HEART RATE: 86 BPM | DIASTOLIC BLOOD PRESSURE: 63 MMHG | OXYGEN SATURATION: 97 % | BODY MASS INDEX: 34.02 KG/M2

## 2017-12-05 PROBLEM — O26.899 ABDOMINAL PAIN COMPLICATING PREGNANCY: Status: ACTIVE | Noted: 2017-12-05

## 2017-12-05 PROBLEM — R10.9 ABDOMINAL PAIN AFFECTING PREGNANCY: Status: ACTIVE | Noted: 2017-12-05
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
## 2017-12-05 PROBLEM — R10.9 ABDOMINAL PAIN COMPLICATING PREGNANCY: Status: ACTIVE | Noted: 2017-12-05

## 2017-12-05 PROBLEM — O26.899 ABDOMINAL PAIN AFFECTING PREGNANCY: Status: ACTIVE | Noted: 2017-12-05

## 2017-12-05 LAB
ABO + RH BLD: NORMAL
ABO + RH BLD: NORMAL
BLD GP AB SCN SERPL QL: NORMAL
BLOOD BANK CMNT PATIENT-IMP: NORMAL
HGB BLD-MCNC: 9.5 G/DL (ref 11.7–15.7)
HGB F BLD QL KLEIH BETKE: NORMAL
SPECIMEN EXP DATE BLD: NORMAL

## 2017-12-05 PROCEDURE — 99214 OFFICE O/P EST MOD 30 MIN: CPT | Mod: 25

## 2017-12-05 PROCEDURE — G0378 HOSPITAL OBSERVATION PER HR: HCPCS

## 2017-12-05 PROCEDURE — 25000132 ZZH RX MED GY IP 250 OP 250 PS 637: Performed by: OBSTETRICS & GYNECOLOGY

## 2017-12-05 PROCEDURE — 25000128 H RX IP 250 OP 636: Performed by: OBSTETRICS & GYNECOLOGY

## 2017-12-05 PROCEDURE — 59025 FETAL NON-STRESS TEST: CPT

## 2017-12-05 PROCEDURE — 96361 HYDRATE IV INFUSION ADD-ON: CPT

## 2017-12-05 RX ORDER — ONDANSETRON 2 MG/ML
4 INJECTION INTRAMUSCULAR; INTRAVENOUS EVERY 6 HOURS PRN
Status: DISCONTINUED | OUTPATIENT
Start: 2017-12-05 | End: 2017-12-05 | Stop reason: HOSPADM

## 2017-12-05 RX ADMIN — SODIUM CHLORIDE, POTASSIUM CHLORIDE, SODIUM LACTATE AND CALCIUM CHLORIDE: 600; 310; 30; 20 INJECTION, SOLUTION INTRAVENOUS at 03:55

## 2017-12-05 RX ADMIN — SODIUM CHLORIDE, POTASSIUM CHLORIDE, SODIUM LACTATE AND CALCIUM CHLORIDE: 600; 310; 30; 20 INJECTION, SOLUTION INTRAVENOUS at 11:44

## 2017-12-05 RX ADMIN — ACETAMINOPHEN 975 MG: 325 TABLET, FILM COATED ORAL at 10:59

## 2017-12-05 NOTE — DISCHARGE SUMMARY
"[facility] Staff - Discharge Note    Pt having rare mild UC's, but not painful.  Abd mildly sore.  Fetus active, no VB, no SROM.    /58  Pulse 86  Temp 97.8  F (36.6  C) (Oral)  Resp 18  Ht 1.6 m (5' 3\")  Wt 87.1 kg (192 lb)  LMP 03/05/2017 (Exact Date)  BMI 34.01 kg/m2  FHTs - Cat 1  NST - Rx    Abd - Gravid, soft, non-tender fundus.  Cx - Closed/30/-5  Ext - No CT    Labs - KB stain Neg, A Pos  Hemoglobin   Date Value Ref Range Status   12/04/2017 9.5 (L) 11.7 - 15.7 g/dL Final   ]    Imp-  1)  IUP at 35w3d  2)  Abd pain after fall, blunt abd trauma - no evidence of fetal compromise, abruption, PTL, PPROM, reassuring surveillance, and neg KB stain.  Now s/p 24 hours of monitoring.  3)  Rh Pos    Plan-  1)  D/C home  2)  [facility] BID, PTL/PROM precautions  3)  F/U 3 days for routine OB appt.    Dionte Escobar MD    "

## 2017-12-05 NOTE — PLAN OF CARE
Lab called with KB results - negative.  Pt slept on and off throughout night, laura occasionally, pt states she has not felt any pain/tightening of abdomen.  No leaking of fluid, no bleeding.  Pt states her back and abdomen are sore, pain has not increased throughout night.

## 2017-12-05 NOTE — H&P
"[facility] OB H&P    Stacia Hannon  2017    This is a 22 year old  at 35 2/7 who presents after a fall around 1900.  She tripped over an extension cord and fell and hit her abdomen on the ground as well as right leg.  She states that her belly button, low back and neck are sore.  She admits to +FM an denies any lof or vb. No ctx's but has noted some mild cramping. Prenatal care at [facility] complicated by insufficient PNC with 4 visits, IUGR with first delivery at 39 weeks, elevated one-hour GCT, never did 2 hour GTT and RNI.    PAST MEDICAL HISTORY:   Past Medical History:   Diagnosis Date     Anemia complicating pregnancy in third trimester 2014     Irregular menses 2017      (normal spontaneous vaginal delivery)     full term     Pancreatitis Aug, 2014     Vaginal delivery 2014       PAST SURGICAL HISTORY:   Past Surgical History:   Procedure Laterality Date     LAPAROSCOPIC CHOLECYSTECTOMY WITH CHOLANGIOGRAMS N/A 2014    Procedure: LAPAROSCOPIC CHOLECYSTECTOMY WITH CHOLANGIOGRAMS;  Surgeon: Sheldon Watts MD;  Location:  OR       FAMILY HISTORY: History reviewed. No pertinent family history.    SOCIAL HISTORY:   Social History   Substance Use Topics     Smoking status: Never Smoker     Smokeless tobacco: Never Used     Alcohol use No       Physical Exam:  /65  Pulse 86  Temp 97.9  F (36.6  C) (Oral)  Resp 18  Ht 1.6 m (5' 3\")  Wt 87.1 kg (192 lb)  LMP 2017 (Exact Date)  BMI 34.01 kg/m2   GEN: A&O times 3, NAD  ABD- small umbilical hernia, not incarcerated, palpated with TTP diffusely on gravid abdomen  EXT: slight bruising on right LE  TOCO: Rare  SVE: deferred  FHT'S:135, moderate variability, reactive, category 1    Assessment and Plan: IUP 35 2/7 with fall to the abdomen and musculoskeletal pain    1. Admit for observation.  IV fluids, Hgb, T&S and KB ordered.  Continuous fetal monitoring/ TOCO.  2. Tylenol, Flexeril and " heating pad ordered for musculoskeletal pain.  3. Umbilical hernia- discussed consult with general surgery either before or after delivery.  4. Continue to monitor with anticipated discharge tomorrow.  Stressed the importance of weekly visits as she has had insufficient care this pregnancy and she has an appt early next week.    Shivani Gupta

## 2017-12-05 NOTE — PROVIDER NOTIFICATION
12/05/17 1050   Provider Notification   Provider Name/Title Dr Escobar   Method of Notification Phone   Request Evaluate - Remote   Notification Reason Status Update   pt feeling ucs 6/10, and would like diet order.  Orders from MD for tylenol and ok for pt to eat.  Shawn will be in to see pt.

## 2017-12-05 NOTE — PROVIDER NOTIFICATION
12/05/17 0800   Provider Notification   Provider Name/Title HORTENCIA   Method of Notification In Department   Request Evaluate - Remote   Notification Reason Status Update   POC is to have her stay for a total 24 hours.  U/S was discussed and  Patient is sleeping and will call out when awake.  She will be apprised of POC at that time.

## 2017-12-05 NOTE — PROVIDER NOTIFICATION
12/04/17 2141   Provider Notification   Provider Name/Title Dr. Gupta   Method of Notification In Department   Dr. Gupta updated re: Pt fall at 2000, tripped over extension cord directly on abdomen. FHT is category 1.  Assessment WNL except umbilicus extended outward, tender and soft to touch.  Orders received for Hgb, Type/Screen, KB, acetaminophen, and zofran.  Continuous monitoring, and LR at 125ml/hr.

## 2017-12-06 NOTE — PLAN OF CARE
Problem: Patient Care Overview  Goal: Plan of Care/Patient Progress Review  Patient experienced sudden onset of tingling progressing to numbness in the lower lip.  She denies food allergies and hasn't eaten anything recently.  She is not in respiratory distress, O2 sats 97% with vital signs stable.  FHR reactive.  Dr Escobar was paged and updated.   If the numbness progresses or persists over the next hour hospitalitis is to be paged.

## 2017-12-06 NOTE — DISCHARGE INSTRUCTIONS
Discharge Instruction for Undelivered Patients      You were seen for: Fetal Assessment and pain from fall  We Consulted: Dr Escobar  You had (Test or Medicine): [facility] lab with blood type and screen.      Diet:   Drink 8 to 12 glasses of liquids (milk, juice, water) every day.  You may eat meals and snacks.     Activity:  Count fetal kicks everyday (see handout)  Call your doctor or nurse midwife if your baby is moving less than usual.     Call your provider if you notice:  Swelling in your face or increased swelling in your hands or legs.  Headaches that are not relieved by Tylenol (acetaminophen).  Changes in your vision (blurring: seeing spots or stars.)  Nausea (sick to your stomach) and vomiting (throwing up).   Weight gain of 5 pounds or more per week.  Heartburn that doesn't go away.  Signs of bladder infection: pain when you urinate (use the toilet), need to go more often and more urgently.  The bag of jenkins (rupture of membranes) breaks, or you notice leaking in your underwear.  Bright red blood in your underwear.  Abdominal (lower belly) or stomach pain.  For first baby: Contractions (tightening) less than 5 minutes apart for one hour or more.  Second (plus) baby: Contractions (tightening) less than 10 minutes apart and getting stronger.  *If less than 34 weeks: Contractions (tightenings) more than 6 times in one hour.  Increase or change in vaginal discharge (note the color and amount)      Follow-up:  As scheduled in the clinic in 3 days for scheduled appt.

## 2017-12-06 NOTE — PROVIDER NOTIFICATION
12/05/17 2050   Provider Notification   Provider Name/Title Dr Escobar   Method of Notification Phone   Request Evaluate - Remote   Notification Reason Status Update   Updated Dr Escobar that pt is stable and FHT reactive. Order received ok for 2100 Discharge.

## 2017-12-06 NOTE — PLAN OF CARE
Problem: Patient Care Overview  Goal: Plan of Care/Patient Progress Review  Patient states that the numb lower lip resolved spontaneously after 30 minutes.

## 2017-12-20 ENCOUNTER — HOSPITAL ENCOUNTER (OUTPATIENT)
Facility: CLINIC | Age: 22
Discharge: HOME | End: 2017-12-21
Attending: OBSTETRICS & GYNECOLOGY | Admitting: OBSTETRICS & GYNECOLOGY
Payer: MEDICAID

## 2017-12-20 PROCEDURE — 40000809 ZZH STATISTIC NO DOCUMENTATION TO SUPPORT CHARGE

## 2017-12-20 PROCEDURE — 99214 OFFICE O/P EST MOD 30 MIN: CPT | Mod: 25

## 2017-12-20 PROCEDURE — 87653 STREP B DNA AMP PROBE: CPT | Performed by: OBSTETRICS & GYNECOLOGY

## 2017-12-20 PROCEDURE — 87186 SC STD MICRODIL/AGAR DIL: CPT | Performed by: OBSTETRICS & GYNECOLOGY

## 2017-12-20 PROCEDURE — 59025 FETAL NON-STRESS TEST: CPT

## 2017-12-20 NOTE — IP AVS SNAPSHOT
[facility] Labor and Delivery    [address]    [facility] [address]    Phone:  [phone]    Fax:  [phone]                                       After Visit Summary   12/20/2017    Stacia Hannon    MRN: [id number]           After Visit Summary Signature Page     I have received my discharge instructions, and my questions have been answered. I have discussed any challenges I see with this plan with the nurse or doctor.    ..........................................................................................................................................  Patient/Patient Representative Signature      ..........................................................................................................................................  Patient Representative Print Name and Relationship to Patient    ..................................................               ................................................  Date                                            Time    ..........................................................................................................................................  Reviewed by Signature/Title    ...................................................              ..............................................  Date                                                            Time

## 2017-12-20 NOTE — IP AVS SNAPSHOT
MRN:[id number]                      After Visit Summary   12/20/2017    Stacia Hannon    MRN: [id number]           Thank you!     Thank you for choosing [facility] for your care. Our goal is always to provide you with excellent care. Hearing back from our patients is one way we can continue to improve our services. Please take a few minutes to complete the written survey that you may receive in the mail after you visit. If you would like to speak to someone directly about your visit please contact Patient Relations at [phone]. Thank you!          Patient Information     Date Of Birth          1995        About your hospital stay     You were admitted on:  December 20, 2017 You last received care in the:  [facility] Labor and Delivery    You were discharged on:  December 20, 2017       Who to Call     For medical emergencies, please call 911.  For non-urgent questions about your medical care, please call your primary care provider or clinic, [phone]          Attending Provider     Provider Specialty    Dominic De La Torre MD OB/Gyn       Primary Care Provider Office Phone # Fax #    [place] [facility] [phone]      Further instructions from your care team       Discharge Instruction for Undelivered Patients      You were seen for: Labor Assessment  We Consulted:   You had (Test or Medicine):fetal monitoring, contraction monitoring, cervical examination and GBS culture collected     Diet:   Drink 8 to 12 glasses of liquids (milk, juice, water) every day.  You may eat meals and snacks.     Activity:  Count fetal kicks everyday (see handout)  Call your doctor or nurse midwife if your baby is moving less than usual.     Call your provider if you notice:  Swelling in your face or increased swelling in your hands or legs.  Headaches that are not relieved by Tylenol (acetaminophen).  Changes in your vision (blurring: seeing spots or  "stars.)  Nausea (sick to your stomach) and vomiting (throwing up).   Weight gain of 5 pounds or more per week.  Heartburn that doesn't go away.  Signs of bladder infection: pain when you urinate (use the toilet), need to go more often and more urgently.  The bag of jenkins (rupture of membranes) breaks, or you notice leaking in your underwear.  Bright red blood in your underwear.  Abdominal (lower belly) or stomach pain.  Second (plus) baby: Contractions (tightening) less than 10 minutes apart and getting stronger and more consistent.   Increase or change in vaginal discharge (note the color and amount)  Notify your doctor with any further questions or concerns.    Follow-up:  As scheduled in the clinic on 17.           Pending Results     No orders found from 2017 to 2017.            Admission Information     Date & Time Provider Department Dept. Phone    2017 Dominic De La Torre MD [facility] Labor and Delivery [phone]      Your Vitals Were     Temperature Respirations Last Period             98.9  F (37.2  C) (Oral) 18 2017 (Exact Date)         MyChart Information     [employer] lets you send messages to your doctor, view your test results, renew your prescriptions, schedule appointments and more. To sign up, go to [email or web address] . Click on \"Log in\" on the left side of the screen, which will take you to the Welcome page. Then click on \"Sign up Now\" on the right side of the page.     You will be asked to enter the access code listed below, as well as some personal information. Please follow the directions to create your username and password.     Your access code is: [email or web address]  Expires: 2018 10:35 PM     Your access code will  in 90 days. If you need help or a new code, please call your [place] clinic or [phone].        Care EveryWhere ID     This is your Care EveryWhere ID. This could be used by other organizations to access your [place] medical " records  [id number]        Equal Access to Services     [facility] DALIA : Hadii aad ku hadciarajairon Arevalo, waaxda lujodiadaha, qaybta katiemajenny acharya, jewels cobosisíaasaditi shah. So [facility] [phone].    ATENCIÓN: Si habla español, tiene a rodgers disposición servicios gratuitos de asistencia lingüística. Llame al [phone].    We comply with applicable federal civil rights laws and [place] laws. We do not discriminate on the basis of race, color, national origin, age, disability, sex, sexual orientation, or gender identity.               Review of your medicines      UNREVIEWED medicines. Ask your doctor about these medicines        Dose / Directions    IRON SUPPLEMENT PO        Take by mouth 2 times daily (with meals)   Refills:  0       prenatal multivitamin plus iron 27-0.8 MG Tabs per tablet        Dose:  1 tablet   Take 1 tablet by mouth daily   Refills:  0                Protect others around you: Learn how to safely use, store and throw away your medicines at www.disposemymeds.org.             Medication List: This is a list of all your medications and when to take them. Check marks below indicate your daily home schedule. Keep this list as a reference.      Medications           Morning Afternoon Evening Bedtime As Needed    IRON SUPPLEMENT PO   Take by mouth 2 times daily (with meals)                                prenatal multivitamin plus iron 27-0.8 MG Tabs per tablet   Take 1 tablet by mouth daily                                          More Information        Kick Counts  It s normal to worry about your baby s health. One way you can know your baby s doing well is to record the baby s movements once a day. This is called a kick count. You will usually feel your baby move by the 20th week of pregnancy. Remember to take your kick count records to all your appointments with your healthcare provider.       How to Count Kicks    Choose a time when the baby is active, such as after a  meal.     Sit comfortably or lie on your side.     The first time the baby moves, write down the time.     Count each movement until the baby has moved 10 times. This can take from 20 minutes to 2 hours.     If the baby hasn t moved 4 times in 1 hour, pat your stomach to wake the baby up.    Write down the time you feel the baby s 10th movement.    Try to do it at the same time each day.  When to Call Your Healthcare Provider  Call your healthcare provider right away if you notice any of the following:    Your baby moves fewer than 10 times in 2 hours while you re doing kick counts.    Your baby moves much less often than on the days before.    You have not felt your baby move all day.    6648-4276 [employer]. [address] [place] [address], [place] [address]. All rights reserved. This information is not intended as a substitute for professional medical care. Always follow your healthcare professional's instructions.  This information has been modified by your health care provider with permission from the publisher.            Labor and Childbirth: Active Labor  During active labor, your contractions will be stronger and more rhythmic than with early labor. They peak and subside like waves. They may happen 3 to 5 minutes apart and last about 45 to 60 seconds. This part of labor can be hard work. But it is often shorter than early labor. When you reach active labor, exams and tests will be done to see how you and your baby are doing.     Your cervix may dilate 4 to 8 centimeters during the first part of active labor.   Evaluating you and your baby  An exam tells how you and your baby are responding to contractions. Your blood pressure, temperature, and pulse will be checked. A blood or urine sample may also be taken. A fetal monitor will be used to check your baby s heart rate. Sometimes an IV (intravenous) line is started to give you medicine and fluids.  Moving ahead with labor  You may now feel contractions  in your whole stomach instead of just the lower part (like during early labor). If your amniotic sac has not broken already, it may break now. Or, it may be broken for you. To help your baby descend, change position often. Walking or sitting in a rocking chair or recliner may help. You may find it hard to relax even though you are tired. You may also be less interested in talking than you were earlier. If you re having anesthesia, you will get it now.   Special issues during labor  If labor doesn t progress well or a problem arises, you may need a . But your healthcare providers may take certain steps to help you avoid a :    If your cervix isn t dilating, a medicine (oxytocin) may be used to augment labor.    If fetal monitoring shows your baby isn t getting enough oxygen, shifting your body position may help. You may also be given oxygen through a mask.    If you have preeclampsia (a condition that results in high blood pressure, swelling, and other symptoms), you may be given medicines by IV (intravenous). Your healthcare provider may also tell you to lie on your left side.  Responding to contractions  During contractions, try to stay relaxed. Tense muscles use more oxygen, eat up your body s energy, and increase pain. Use the breathing and relaxation techniques you may have learned. And let your support person know how he or she can help. If you ve had problems during a previous birth, focus on the present. Keep in mind that no 2 births are the same.     Support person s note  Here's how you can help:    Have the mother walk or change positions at least once an hour. This improves circulation and helps the baby descend.    Keep reminding the mother to breathe and relax through each contraction.    Reassure her. Try to keep her from getting anxious or overstressed.    Take care of yourself. Take a short break to eat or go to the bathroom when you need to.    Rest when the mother does. You ll both  benefit.   Date Last Reviewed: 8/16/2015 2000-2017 The "[employer]. [address] [place] [address], [place] [address]. All rights reserved. This information is not intended as a substitute for professional medical care. Always follow your healthcare professional's instructions.

## 2017-12-21 ENCOUNTER — ANESTHESIA EVENT (OUTPATIENT)
Dept: OBGYN | Facility: CLINIC | Age: 22
End: 2017-12-21
Payer: MEDICAID

## 2017-12-21 ENCOUNTER — HOSPITAL ENCOUNTER (INPATIENT)
Facility: CLINIC | Age: 22
LOS: 2 days | Discharge: HOME | End: 2017-12-23
Attending: SPECIALIST | Admitting: SPECIALIST
Payer: MEDICAID

## 2017-12-21 ENCOUNTER — ANESTHESIA (OUTPATIENT)
Dept: OBGYN | Facility: CLINIC | Age: 22
End: 2017-12-21
Payer: MEDICAID

## 2017-12-21 VITALS — DIASTOLIC BLOOD PRESSURE: 59 MMHG | RESPIRATION RATE: 18 BRPM | TEMPERATURE: 98.9 F | SYSTOLIC BLOOD PRESSURE: 120 MMHG

## 2017-12-21 LAB
ABO + RH BLD: NORMAL
ABO + RH BLD: NORMAL
BLD GP AB SCN SERPL QL: NORMAL
BLOOD BANK CMNT PATIENT-IMP: NORMAL
GP B STREP DNA SPEC QL NAA+PROBE: POSITIVE
SPECIMEN EXP DATE BLD: NORMAL
SPECIMEN SOURCE: ABNORMAL

## 2017-12-21 PROCEDURE — 25000132 ZZH RX MED GY IP 250 OP 250 PS 637: Performed by: SPECIALIST

## 2017-12-21 PROCEDURE — 3E0R3BZ INTRODUCTION OF ANESTHETIC AGENT INTO SPINAL CANAL, PERCUTANEOUS APPROACH: ICD-10-PCS | Performed by: ANESTHESIOLOGY

## 2017-12-21 PROCEDURE — 00HU33Z INSERTION OF INFUSION DEVICE INTO SPINAL CANAL, PERCUTANEOUS APPROACH: ICD-10-PCS | Performed by: ANESTHESIOLOGY

## 2017-12-21 PROCEDURE — 36415 COLL VENOUS BLD VENIPUNCTURE: CPT | Performed by: SPECIALIST

## 2017-12-21 PROCEDURE — 25000128 H RX IP 250 OP 636: Performed by: SPECIALIST

## 2017-12-21 PROCEDURE — 86850 RBC ANTIBODY SCREEN: CPT | Performed by: SPECIALIST

## 2017-12-21 PROCEDURE — 72200001 ZZH LABOR CARE VAGINAL DELIVERY SINGLE

## 2017-12-21 PROCEDURE — 12000037 ZZH R&B POSTPARTUM INTERMEDIATE

## 2017-12-21 PROCEDURE — 86901 BLOOD TYPING SEROLOGIC RH(D): CPT | Performed by: SPECIALIST

## 2017-12-21 PROCEDURE — 25000125 ZZHC RX 250: Performed by: ANESTHESIOLOGY

## 2017-12-21 PROCEDURE — 25000128 H RX IP 250 OP 636: Performed by: ANESTHESIOLOGY

## 2017-12-21 PROCEDURE — 86780 TREPONEMA PALLIDUM: CPT | Performed by: SPECIALIST

## 2017-12-21 PROCEDURE — 86900 BLOOD TYPING SEROLOGIC ABO: CPT | Performed by: SPECIALIST

## 2017-12-21 PROCEDURE — 25000125 ZZHC RX 250: Performed by: SPECIALIST

## 2017-12-21 PROCEDURE — 37000011 ZZH ANESTHESIA WARD SERVICE

## 2017-12-21 RX ORDER — LIDOCAINE HYDROCHLORIDE AND EPINEPHRINE 15; 5 MG/ML; UG/ML
INJECTION, SOLUTION EPIDURAL PRN
Status: DISCONTINUED | OUTPATIENT
Start: 2017-12-21 | End: 2017-12-22 | Stop reason: HOSPADM

## 2017-12-21 RX ORDER — METHYLERGONOVINE MALEATE 0.2 MG/ML
200 INJECTION INTRAVENOUS
Status: DISCONTINUED | OUTPATIENT
Start: 2017-12-21 | End: 2017-12-21

## 2017-12-21 RX ORDER — IBUPROFEN 400 MG/1
800 TABLET, FILM COATED ORAL
Status: DISCONTINUED | OUTPATIENT
Start: 2017-12-21 | End: 2017-12-21

## 2017-12-21 RX ORDER — BISACODYL 10 MG
10 SUPPOSITORY, RECTAL RECTAL DAILY PRN
Status: DISCONTINUED | OUTPATIENT
Start: 2017-12-23 | End: 2017-12-23 | Stop reason: HOSPADM

## 2017-12-21 RX ORDER — ROPIVACAINE HYDROCHLORIDE 2 MG/ML
INJECTION, SOLUTION EPIDURAL; INFILTRATION; PERINEURAL PRN
Status: DISCONTINUED | OUTPATIENT
Start: 2017-12-21 | End: 2017-12-22 | Stop reason: HOSPADM

## 2017-12-21 RX ORDER — AMOXICILLIN 250 MG
1 CAPSULE ORAL 2 TIMES DAILY PRN
Status: DISCONTINUED | OUTPATIENT
Start: 2017-12-21 | End: 2017-12-23 | Stop reason: HOSPADM

## 2017-12-21 RX ORDER — ONDANSETRON 2 MG/ML
4 INJECTION INTRAMUSCULAR; INTRAVENOUS EVERY 6 HOURS PRN
Status: DISCONTINUED | OUTPATIENT
Start: 2017-12-21 | End: 2017-12-21

## 2017-12-21 RX ORDER — EPHEDRINE SULFATE 50 MG/ML
5 INJECTION, SOLUTION INTRAMUSCULAR; INTRAVENOUS; SUBCUTANEOUS
Status: DISCONTINUED | OUTPATIENT
Start: 2017-12-21 | End: 2017-12-21

## 2017-12-21 RX ORDER — NALBUPHINE HYDROCHLORIDE 10 MG/ML
2.5-5 INJECTION, SOLUTION INTRAMUSCULAR; INTRAVENOUS; SUBCUTANEOUS EVERY 6 HOURS PRN
Status: DISCONTINUED | OUTPATIENT
Start: 2017-12-21 | End: 2017-12-21

## 2017-12-21 RX ORDER — HYDROCORTISONE 2.5 %
CREAM (GRAM) TOPICAL 3 TIMES DAILY PRN
Status: DISCONTINUED | OUTPATIENT
Start: 2017-12-21 | End: 2017-12-23 | Stop reason: HOSPADM

## 2017-12-21 RX ORDER — ACETAMINOPHEN 325 MG/1
650 TABLET ORAL EVERY 4 HOURS PRN
Status: DISCONTINUED | OUTPATIENT
Start: 2017-12-21 | End: 2017-12-21

## 2017-12-21 RX ORDER — OXYCODONE AND ACETAMINOPHEN 5; 325 MG/1; MG/1
1 TABLET ORAL
Status: DISCONTINUED | OUTPATIENT
Start: 2017-12-21 | End: 2017-12-21

## 2017-12-21 RX ORDER — IBUPROFEN 400 MG/1
800 TABLET, FILM COATED ORAL EVERY 6 HOURS PRN
Status: DISCONTINUED | OUTPATIENT
Start: 2017-12-21 | End: 2017-12-23 | Stop reason: HOSPADM

## 2017-12-21 RX ORDER — FENTANYL CITRATE 50 UG/ML
INJECTION, SOLUTION INTRAMUSCULAR; INTRAVENOUS PRN
Status: DISCONTINUED | OUTPATIENT
Start: 2017-12-21 | End: 2017-12-22 | Stop reason: HOSPADM

## 2017-12-21 RX ORDER — OXYTOCIN 10 [USP'U]/ML
10 INJECTION, SOLUTION INTRAMUSCULAR; INTRAVENOUS
Status: DISCONTINUED | OUTPATIENT
Start: 2017-12-21 | End: 2017-12-23 | Stop reason: HOSPADM

## 2017-12-21 RX ORDER — LANOLIN 100 %
OINTMENT (GRAM) TOPICAL
Status: DISCONTINUED | OUTPATIENT
Start: 2017-12-21 | End: 2017-12-23 | Stop reason: HOSPADM

## 2017-12-21 RX ORDER — CARBOPROST TROMETHAMINE 250 UG/ML
250 INJECTION, SOLUTION INTRAMUSCULAR
Status: DISCONTINUED | OUTPATIENT
Start: 2017-12-21 | End: 2017-12-21

## 2017-12-21 RX ORDER — OXYTOCIN/0.9 % SODIUM CHLORIDE 30/500 ML
340 PLASTIC BAG, INJECTION (ML) INTRAVENOUS CONTINUOUS PRN
Status: DISCONTINUED | OUTPATIENT
Start: 2017-12-21 | End: 2017-12-23 | Stop reason: HOSPADM

## 2017-12-21 RX ORDER — ROPIVACAINE HYDROCHLORIDE 2 MG/ML
10 INJECTION, SOLUTION EPIDURAL; INFILTRATION; PERINEURAL ONCE
Status: DISCONTINUED | OUTPATIENT
Start: 2017-12-21 | End: 2017-12-21

## 2017-12-21 RX ORDER — ONDANSETRON 4 MG/1
4 TABLET, ORALLY DISINTEGRATING ORAL EVERY 6 HOURS PRN
Status: DISCONTINUED | OUTPATIENT
Start: 2017-12-21 | End: 2017-12-21

## 2017-12-21 RX ORDER — NALOXONE HYDROCHLORIDE 0.4 MG/ML
.1-.4 INJECTION, SOLUTION INTRAMUSCULAR; INTRAVENOUS; SUBCUTANEOUS
Status: DISCONTINUED | OUTPATIENT
Start: 2017-12-21 | End: 2017-12-21

## 2017-12-21 RX ORDER — OXYTOCIN/0.9 % SODIUM CHLORIDE 30/500 ML
100 PLASTIC BAG, INJECTION (ML) INTRAVENOUS CONTINUOUS
Status: DISCONTINUED | OUTPATIENT
Start: 2017-12-21 | End: 2017-12-23 | Stop reason: HOSPADM

## 2017-12-21 RX ORDER — FENTANYL CITRATE 50 UG/ML
50-100 INJECTION, SOLUTION INTRAMUSCULAR; INTRAVENOUS
Status: DISCONTINUED | OUTPATIENT
Start: 2017-12-21 | End: 2017-12-21

## 2017-12-21 RX ORDER — PENICILLIN G POTASSIUM 5000000 [IU]/1
5 INJECTION, POWDER, FOR SOLUTION INTRAMUSCULAR; INTRAVENOUS ONCE
Status: COMPLETED | OUTPATIENT
Start: 2017-12-21 | End: 2017-12-21

## 2017-12-21 RX ORDER — NALOXONE HYDROCHLORIDE 0.4 MG/ML
.1-.4 INJECTION, SOLUTION INTRAMUSCULAR; INTRAVENOUS; SUBCUTANEOUS
Status: DISCONTINUED | OUTPATIENT
Start: 2017-12-21 | End: 2017-12-23 | Stop reason: HOSPADM

## 2017-12-21 RX ORDER — MISOPROSTOL 200 UG/1
400 TABLET ORAL
Status: DISCONTINUED | OUTPATIENT
Start: 2017-12-21 | End: 2017-12-23 | Stop reason: HOSPADM

## 2017-12-21 RX ORDER — ACETAMINOPHEN 325 MG/1
650 TABLET ORAL EVERY 4 HOURS PRN
Status: DISCONTINUED | OUTPATIENT
Start: 2017-12-21 | End: 2017-12-23 | Stop reason: HOSPADM

## 2017-12-21 RX ORDER — OXYTOCIN/0.9 % SODIUM CHLORIDE 30/500 ML
100-340 PLASTIC BAG, INJECTION (ML) INTRAVENOUS CONTINUOUS PRN
Status: COMPLETED | OUTPATIENT
Start: 2017-12-21 | End: 2017-12-21

## 2017-12-21 RX ORDER — AMOXICILLIN 250 MG
2 CAPSULE ORAL 2 TIMES DAILY PRN
Status: DISCONTINUED | OUTPATIENT
Start: 2017-12-21 | End: 2017-12-23 | Stop reason: HOSPADM

## 2017-12-21 RX ORDER — FENTANYL CITRATE 50 UG/ML
100 INJECTION, SOLUTION INTRAMUSCULAR; INTRAVENOUS ONCE
Status: DISCONTINUED | OUTPATIENT
Start: 2017-12-21 | End: 2017-12-21

## 2017-12-21 RX ORDER — SODIUM CHLORIDE, SODIUM LACTATE, POTASSIUM CHLORIDE, CALCIUM CHLORIDE 600; 310; 30; 20 MG/100ML; MG/100ML; MG/100ML; MG/100ML
INJECTION, SOLUTION INTRAVENOUS CONTINUOUS
Status: DISCONTINUED | OUTPATIENT
Start: 2017-12-21 | End: 2017-12-21

## 2017-12-21 RX ORDER — OXYTOCIN 10 [USP'U]/ML
10 INJECTION, SOLUTION INTRAMUSCULAR; INTRAVENOUS
Status: DISCONTINUED | OUTPATIENT
Start: 2017-12-21 | End: 2017-12-21

## 2017-12-21 RX ADMIN — IBUPROFEN 800 MG: 400 TABLET ORAL at 18:05

## 2017-12-21 RX ADMIN — PENICILLIN G POTASSIUM 5 MILLION UNITS: 5000000 POWDER, FOR SOLUTION INTRAMUSCULAR; INTRAPLEURAL; INTRATHECAL; INTRAVENOUS at 14:37

## 2017-12-21 RX ADMIN — ACETAMINOPHEN 650 MG: 325 TABLET, FILM COATED ORAL at 18:05

## 2017-12-21 RX ADMIN — IBUPROFEN 800 MG: 400 TABLET ORAL at 23:52

## 2017-12-21 RX ADMIN — ACETAMINOPHEN 650 MG: 325 TABLET, FILM COATED ORAL at 23:52

## 2017-12-21 RX ADMIN — FENTANYL CITRATE 100 MCG: 50 INJECTION, SOLUTION INTRAMUSCULAR; INTRAVENOUS at 14:36

## 2017-12-21 RX ADMIN — Medication 12 ML/HR: at 14:40

## 2017-12-21 RX ADMIN — LIDOCAINE HYDROCHLORIDE AND EPINEPHRINE 3 ML: 15; 5 INJECTION, SOLUTION EPIDURAL at 14:34

## 2017-12-21 RX ADMIN — SODIUM CHLORIDE, POTASSIUM CHLORIDE, SODIUM LACTATE AND CALCIUM CHLORIDE: 600; 310; 30; 20 INJECTION, SOLUTION INTRAVENOUS at 14:10

## 2017-12-21 RX ADMIN — ROPIVACAINE HYDROCHLORIDE 5 ML: 2 INJECTION, SOLUTION EPIDURAL; INFILTRATION at 14:38

## 2017-12-21 RX ADMIN — ROPIVACAINE HYDROCHLORIDE 5 ML: 2 INJECTION, SOLUTION EPIDURAL; INFILTRATION at 14:36

## 2017-12-21 RX ADMIN — OXYTOCIN-SODIUM CHLORIDE 0.9% IV SOLN 30 UNIT/500ML 340 ML/HR: 30-0.9/5 SOLUTION at 15:26

## 2017-12-21 NOTE — PROVIDER NOTIFICATION
17 6753   Provider Notification   Provider Name/Title    Method of Notification Phone   Request Evaluate - Remote   Notification Reason Patient Arrived;Uterine Activity;Pain;SVE    called with patient arrival.  at 37.4 wks here for labor evaluation. Patient reports contractions started at 0400, however became regular at 2000. Denies vaginal bleeding or leaking. Reports + fetal movement. FHT category 1 tracing with moderate variability. Contractions irregular every 5-8 min apart, patient rates pain 4/10. SVE 2.5/50/-2 posterior. Reviewed prenatal history; limited prenatal visits due to insurance, no need for drug screen testing, unknown GBS status and rubella not immune. Verbal order to collect GBS culture, obtain an NST and discuss option of staying for further labor evaluation or discharge home and follow up with scheduled appointment on 17. POC discussed with patient.     0663: Talked with the patient about plan; patient desires to go home after cervical examination. SVE unchanged. Anticipate discharge home.

## 2017-12-21 NOTE — ANESTHESIA PREPROCEDURE EVALUATION
"Procedure: * No procedures listed *  Preop diagnosis: * No pre-op diagnosis entered *    No Known Allergies  Past Medical History:   Diagnosis Date     Anemia complicating pregnancy in third trimester 2014    currently taking iron      (normal spontaneous vaginal delivery) 2010    full term     Pancreatitis Aug, 2014     Vaginal delivery 2014     Past Surgical History:   Procedure Laterality Date     LAPAROSCOPIC CHOLECYSTECTOMY WITH CHOLANGIOGRAMS N/A 2014    Procedure: LAPAROSCOPIC CHOLECYSTECTOMY WITH CHOLANGIOGRAMS;  Surgeon: Sheldon Watts MD;  Location:  OR     Prior to Admission medications    Medication Sig Start Date End Date Taking? Authorizing Provider   Ferrous Sulfate (IRON SUPPLEMENT PO) Take by mouth 2 times daily (with meals)    Reported, Patient   Prenatal Vit-Fe Fumarate-FA (PRENATAL MULTIVITAMIN PLUS IRON) 27-0.8 MG TABS per tablet Take 1 tablet by mouth daily    Reported, Patient     Current Facility-Administered Medications Ordered in Epic   Medication Dose Route Frequency Last Rate Last Dose     lactated ringers infusion   Intravenous Continuous         lactated ringers BOLUS 500 mL  500 mL Intravenous Once PRN         lactated ringers BOLUS 1,000 mL  1,000 mL Intravenous Once PRN        Or     lactated ringers BOLUS 500 mL  500 mL Intravenous Once PRN         acetaminophen (TYLENOL) tablet 650 mg  650 mg Oral Q4H PRN         naloxone (NARCAN) injection 0.1-0.4 mg  0.1-0.4 mg Intravenous Q2 Min PRN         ondansetron (ZOFRAN) injection 4 mg  4 mg Intravenous Q6H PRN         oxytocin (PITOCIN) injection 10 Units  10 Units Intramuscular Once PRN         oxytocin (PITOCIN) 30 units in 500 mL 0.9% NaCl infusion  100-340 mL/hr Intravenous Continuous PRN         Medication Instructions: misoprostol (CYTOTEC)- Nurse to discuss ordering with provider, if needed. Ordered via \"OB misoprostol (CYTOTEC) Postpartum Hemorrhage PANEL\"   Does not apply Continuous PRN         " methylergonovine (METHERGINE) injection 200 mcg  200 mcg Intramuscular Once PRN         carboprost (HEMABATE) injection 250 mcg  250 mcg Intramuscular Once PRN         lidocaine 1 % 0.1-20 mL  0.1-20 mL Subcutaneous Once PRN         ibuprofen (ADVIL/MOTRIN) tablet 800 mg  800 mg Oral Once PRN         oxyCODONE-acetaminophen (PERCOCET) 5-325 MG per tablet 1 tablet  1 tablet Oral Once PRN         fentaNYL (PF) (SUBLIMAZE) injection  mcg   mcg Intravenous Q1H PRN         penicillin G potassium injection 5 Million Units  5 Million Units Intravenous Once        Followed by     penicillin G potassium injection 2.5 Million Units  2.5 Million Units Intravenous Q4H         No current Epic-ordered outpatient prescriptions on file.     Wt Readings from Last 1 Encounters:   12/04/17 87.1 kg (192 lb)     Temp Readings from Last 1 Encounters:   12/21/17 36.7  C (98.1  F) (Temporal)     BP Readings from Last 6 Encounters:   12/21/17 136/84   12/20/17 120/59   12/05/17 119/63   10/25/17 106/61   05/14/17 110/69   03/12/17 92/54     Pulse Readings from Last 4 Encounters:   12/04/17 86   05/14/17 70   03/12/17 86   03/11/17 91     Resp Readings from Last 1 Encounters:   12/21/17 16     SpO2 Readings from Last 1 Encounters:   12/05/17 97%     Recent Labs   Lab Test  05/13/17   2138  03/11/17   2111   NA  139  139   POTASSIUM  3.6  3.6   CHLORIDE  106  105   CO2  24  25   ANIONGAP  9  9   GLC  102*  103*   BUN  10  9   CR  0.47*  0.57   CAROL  8.8  8.5     Recent Labs   Lab Test  12/04/17   2300  05/13/17   2138  03/11/17   2111   WBC   --   7.3  14.1*   HGB  9.5*  12.2  13.3   PLT   --   296  316     Recent Labs   Lab Test  09/24/14   0645   INR  1.16*                               Anesthesia Plan      History & Physical Review      ASA Status:  .  OB Epidural Asa: 2            Postoperative Care      Consents  Anesthetic plan, risks, benefits and alternatives discussed with:  Patient..                          .

## 2017-12-21 NOTE — ANESTHESIA PROCEDURE NOTES
Peripheral nerve/Neuraxial procedure note : epidural catheter  Pre-Procedure  Performed by ANJELICA CORNEJO  Location: OB      Pre-Anesthestic Checklist: patient identified, IV checked, site marked, risks and benefits discussed, informed consent, monitors and equipment checked, pre-op evaluation and at physician/surgeon's request    Timeout  Correct Patient: Yes   Correct Procedure: Yes   Correct Site: Yes   Correct Laterality: Yes   Correct Position: Yes   Site Marked: Yes   .   Procedure Documentation    .    Procedure:    Epidural catheter.  Insertion Site:L2-3  (midline approach) Injection technique: LORT saline and LORT air   Local skin infiltrated with 1 mL of 1% lidocaine.       Patient Prep;povidone-iodine 7.5% surgical scrub.  .  Needle: Touhy needle Needle Gauge: 17.    Needle Length (Inches) 3.5  # of attempts: 1 and # of redirects:  .   Catheter: 19 G . .  Catheter threaded easily  .  .   .    Assessment/Narrative  Paresthesias: No.  .  .  Aspiration negative for heme or CSF  . Test dose of 3 mL lidocaine 1.5% w/ 1:200,000 epinephrine at. Test dose negative for signs of intravascular, subdural or intrathecal injection. Comments:  Pre-procedure time out completed. Patient in sitting position, the lumbar spine was prepped and draped in sterile fashion. The L2/L3 interspace was identified and local anesthetic was injected for local skin infiltration. A 17 G touhy needle was advanced to the epidural space which was confirmed with the loss of resistance technique at 6 cm. A catheter was then advanced easily into the epidural space. The cathter was left at 10 cm at the skin. Negative aspiration of blood and CSF was confirmed. A test dose of 1.5% lidocaine with 1:200,000 epinephrine was injected through the catheter and was negative for intravascular injection. The site was covered with sterile tegaderm and the catheter was secured with tape.

## 2017-12-21 NOTE — PROCEDURES
Delivery Note    37 5/7 weeks gestation, .   over in tact perineum  OB [place]   Anesthesia epidural  EBL 50cc  Findings vigorous male infant apgars 9&9   No complications   No sutures needed  Dorina Sexton MD

## 2017-12-21 NOTE — IP AVS SNAPSHOT
[address]    [place] [address]    Phone:  [phone]                                       After Visit Summary   12/21/2017    Stacia Hannon    MRN: [id number]           After Visit Summary Signature Page     I have received my discharge instructions, and my questions have been answered. I have discussed any challenges I see with this plan with the nurse or doctor.    ..........................................................................................................................................  Patient/Patient Representative Signature      ..........................................................................................................................................  Patient Representative Print Name and Relationship to Patient    ..................................................               ................................................  Date                                            Time    ..........................................................................................................................................  Reviewed by Signature/Title    ...................................................              ..............................................  Date                                                            Time

## 2017-12-21 NOTE — IP AVS SNAPSHOT
MRN:[id number]                      After Visit Summary   12/21/2017    Stacia Hannon    MRN: [id number]           Thank you!     Thank you for choosing [facility] for your care. Our goal is always to provide you with excellent care. Hearing back from our patients is one way we can continue to improve our services. Please take a few minutes to complete the written survey that you may receive in the mail after you visit with us. Thank you!        Patient Information     Date Of Birth          1995        About your hospital stay     You were admitted on:  December 21, 2017 You last received care in the:  [address]    You were discharged on:  December 23, 2017        Reason for your hospital stay       Maternity care                  Who to Call     For medical emergencies, please call 911.  For non-urgent questions about your medical care, please call your primary care provider or clinic, [phone]          Attending Provider     Provider Dior Woods MD OB/Gyn       Primary Care Provider Office Phone # Fax #    [facility] [phone]      After Care Instructions     Activity       Review discharge instructions            Diet       Resume previous diet            Discharge Instructions - Postpartum visit       Schedule postpartum visit with your provider and return to clinic in 6 weeks.                  Follow-up Appointments     Follow Up and recommended labs and tests       Follow up with Dr. Sexton , at (location with clinic name or city) [facility], within 6  Postpartum visit. No follow up labs or test are needed.                  Further instructions from your care team       Postpartum Vaginal Delivery Instructions    Activity       Ask family and friends for help when you need it.    Do not place anything in your vagina for 6 weeks.    You are not restricted on other activities, but take it easy for  a few weeks to allow your body to recover from delivery.  You are able to do any activities you feel up to that point.    No driving until you have stopped taking your pain medications (usually two weeks after delivery).     Call your health care provider if you have any of these symptoms:       Increased pain, swelling, redness, or fluid around your stiches from an episiotomy or perineal tear.    A fever above 100.4 F (38 C) with or without chills when placing a thermometer under your tongue.    You soak a sanitary pad with blood within 1 hour, or you see blood clots larger than a golf ball.    Bleeding that lasts more than 6 weeks.    Vaginal discharge that smells bad.    Severe pain, cramping or tenderness in your lower belly area.    A need to urinate more frequently (use the toilet more often), more urgently (use the toilet very quickly), or it burns when you urinate.    Nausea and vomiting.    Redness, swelling or pain around a vein in your leg.    Problems breastfeeding or a red or painful area on your breast.    Chest pain and cough or are gasping for air.    Problems coping with sadness, anxiety, or depression.  If you have any concerns about hurting yourself or the baby, call your provider immediately.     You have questions or concerns after you return home.     Keep your hands clean:  Always wash your hands before touching your perineal area and stitches.  This helps reduce your risk of infection.  If your hands aren't dirty, you may use an alcohol hand-rub to clean your hands. Keep your nails clean and short.        Pending Results     Date and Time Order Name Status Description    12/20/2017 2354 Referral sensitivity Preliminary             Admission Information     Date & Time Provider Department Dept. Phone    12/21/2017 Dior Sexton MD [address] [phone]      Your Vitals Were     Blood Pressure Pulse Temperature Respirations Last Period Pulse Oximetry     "109/59 71 98.1  F (36.7  C) 16 2017 (Exact Date) 97%      MyChart Information     [employer] lets you send messages to your doctor, view your test results, renew your prescriptions, schedule appointments and more. To sign up, go to [email or web address] . Click on \"Log in\" on the left side of the screen, which will take you to the Welcome page. Then click on \"Sign up Now\" on the right side of the page.     You will be asked to enter the access code listed below, as well as some personal information. Please follow the directions to create your username and password.     Your access code is: [email or web address]  Expires: 2018 10:35 PM     Your access code will  in 90 days. If you need help or a new code, please call your [facility] clinic or [phone].        Care EveryWhere ID     This is your Care EveryWhere ID. This could be used by other organizations to access your [facility] medical records  [id number]        Equal Access to Services     [facility]: Hadreid Arevalo, wajorge truong, qaybannie wynnealkarolyn acharya, jewels durant . So [facility] [phone].    ATENCIÓN: Si habla español, tiene a rodgers disposición servicios gratuitos de asistencia lingüística. Llame al [phone].    We comply with applicable federal civil rights laws and [place] laws. We do not discriminate on the basis of race, color, national origin, age, disability, sex, sexual orientation, or gender identity.               Review of your medicines      CONTINUE these medicines which have NOT CHANGED        Dose / Directions    IRON SUPPLEMENT PO        Take by mouth 2 times daily (with meals)   Refills:  0       prenatal multivitamin plus iron 27-0.8 MG Tabs per tablet        Dose:  1 tablet   Take 1 tablet by mouth daily   Refills:  0                Protect others around you: Learn how to safely use, store and throw away your medicines at www.disposemymeds.org.             Medication List: This is " a list of all your medications and when to take them. Check marks below indicate your daily home schedule. Keep this list as a reference.      Medications           Morning Afternoon Evening Bedtime As Needed    IRON SUPPLEMENT PO   Take by mouth 2 times daily (with meals)                                prenatal multivitamin plus iron 27-0.8 MG Tabs per tablet   Take 1 tablet by mouth daily

## 2017-12-21 NOTE — PLAN OF CARE
Multip at 37 wks presents to triage to be evaluated for labor. Pt's physician delivers at [facility]. POC discussed. Monitors applied. SVE 7/80/-2. Pt transported via bed to room 234. Dr. Sexton notified of SVE. Pt admitted to labor.

## 2017-12-21 NOTE — PLAN OF CARE
Data: [facility]   Reason for maternal/fetal assessment per patient is Rule Out Labor (contractions started at 0400 and increased in frequency at )  . Patient is a . Prenatal record reviewed.      Obstetric History       T2      L2     SAB0   TAB0   Ectopic0   Multiple0   Live Births2       # Outcome Date GA Lbr Johnathon/2nd Weight Sex Delivery Anes PTL Lv   3 Current            2 Term 14 38w6d 02:48 / 00:19 3.09 kg (6 lb 13 oz) M Vag-Spont EPI  CAROLANN      Apgar1:  8                Apgar5: 9   1 Term     M             Medical History:   Past Medical History:   Diagnosis Date     Anemia complicating pregnancy in third trimester 2014    currently taking iron      (normal spontaneous vaginal delivery)     full term     Pancreatitis Aug, 2014     Vaginal delivery 2014   . Gestational Age 37w5d. VSS. Cervix: posterior, dilated to 2.5 and effaced 30-50%.  Fetal movement present. Patient denies backache, vaginal discharge, pelvic pressure, UTI symptoms, GI problems, bloody show, vaginal bleeding, edema, headache, visual disturbances, epigastric or URQ pain, abdominal pain, rupture of membranes. Support person not present.  Action: Verbal consent for EFM. Triage assessment completed. EFM applied for fetal wellbeing, reactive NST obtained. Uterine assessment showed irregular contractions, mild with palpation. Fetal assessment: Presumed adequate fetal oxygenation documented (see flow record). Patient education pamphlets given on fetal movement counts, active labor and discharge instructions. Patient instructed to report change in fetal movement, vaginal leaking of fluid or bleeding, abdominal pain, or any concerns related to the pregnancy to her nurse/physician.   Response: Dr. De La Torre informed of plan of care. Plan per provider is discharge home and follow up with scheduled appointment on 17. Patient verbalized understanding of education and  verbalized agreement with plan. Discharged ambulatory at 0005 via wheelchair.

## 2017-12-21 NOTE — DISCHARGE INSTRUCTIONS
Discharge Instruction for Undelivered Patients      You were seen for: Labor Assessment  We Consulted:   You had (Test or Medicine):fetal monitoring, contraction monitoring, cervical examination and GBS culture collected     Diet:   Drink 8 to 12 glasses of liquids (milk, juice, water) every day.  You may eat meals and snacks.     Activity:  Count fetal kicks everyday (see handout)  Call your doctor or nurse midwife if your baby is moving less than usual.     Call your provider if you notice:  Swelling in your face or increased swelling in your hands or legs.  Headaches that are not relieved by Tylenol (acetaminophen).  Changes in your vision (blurring: seeing spots or stars.)  Nausea (sick to your stomach) and vomiting (throwing up).   Weight gain of 5 pounds or more per week.  Heartburn that doesn't go away.  Signs of bladder infection: pain when you urinate (use the toilet), need to go more often and more urgently.  The bag of jenkins (rupture of membranes) breaks, or you notice leaking in your underwear.  Bright red blood in your underwear.  Abdominal (lower belly) or stomach pain.  Second (plus) baby: Contractions (tightening) less than 10 minutes apart and getting stronger and more consistent.   Increase or change in vaginal discharge (note the color and amount)  Notify your doctor with any further questions or concerns.    Follow-up:  As scheduled in the clinic on 12/26/17.

## 2017-12-21 NOTE — H&P
L&D    Pt is 23 yo  with EDC 18 therefore 37 5/7 weeks with spontaneous labor.  Pt was sent home from hospital last night ([facility] for labor sx's) she was 3.5 cm then. When she arrived she was 7cm and quickly went to complete. She had two other vaginal births that she said went well. She had routine care at  and normal US and labs and was supposed to go to [facility] but was closer to here. She requested an epidural as soon as she arrived.  Past surgery hx cholecystectomy    /84  Temp 98.1  F (36.7  C) (Temporal)  Resp 16  LMP 2017 (Exact Date)   Pt in moderate discomfort  CV RRR  Lungs clear  Abdomen gravid  efw 7+ lbs  FHT's 130 mod variability  uc's Q2 SVE 10/10/0 BBOW    A/P  Pt here in active labor now second stage will run antibiotic in for GBBS prophylaxis and then allow to push if pressure increases.  Dorina Sexton MD

## 2017-12-22 PROBLEM — D50.8 IRON DEFICIENCY ANEMIA SECONDARY TO INADEQUATE DIETARY IRON INTAKE: Status: ACTIVE | Noted: 2017-12-22

## 2017-12-22 LAB — T PALLIDUM IGG+IGM SER QL: NEGATIVE

## 2017-12-22 PROCEDURE — 12000037 ZZH R&B POSTPARTUM INTERMEDIATE

## 2017-12-22 PROCEDURE — 90707 MMR VACCINE SC: CPT | Performed by: SPECIALIST

## 2017-12-22 PROCEDURE — 25000132 ZZH RX MED GY IP 250 OP 250 PS 637: Performed by: SPECIALIST

## 2017-12-22 PROCEDURE — 25000128 H RX IP 250 OP 636: Performed by: SPECIALIST

## 2017-12-22 RX ADMIN — MEASLES, MUMPS, AND RUBELLA VIRUS VACCINE LIVE 0.5 ML: 1000; 12500; 1000 INJECTION, POWDER, LYOPHILIZED, FOR SUSPENSION SUBCUTANEOUS at 09:47

## 2017-12-22 RX ADMIN — SENNOSIDES AND DOCUSATE SODIUM 1 TABLET: 8.6; 5 TABLET ORAL at 21:05

## 2017-12-22 RX ADMIN — IBUPROFEN 800 MG: 400 TABLET ORAL at 06:21

## 2017-12-22 RX ADMIN — SENNOSIDES AND DOCUSATE SODIUM 1 TABLET: 8.6; 5 TABLET ORAL at 09:47

## 2017-12-22 RX ADMIN — ACETAMINOPHEN 650 MG: 325 TABLET, FILM COATED ORAL at 18:05

## 2017-12-22 RX ADMIN — ACETAMINOPHEN 650 MG: 325 TABLET, FILM COATED ORAL at 12:37

## 2017-12-22 RX ADMIN — IBUPROFEN 800 MG: 400 TABLET ORAL at 18:05

## 2017-12-22 RX ADMIN — ACETAMINOPHEN 650 MG: 325 TABLET, FILM COATED ORAL at 06:21

## 2017-12-22 RX ADMIN — IBUPROFEN 800 MG: 400 TABLET ORAL at 12:37

## 2017-12-22 NOTE — PLAN OF CARE
Problem: Patient Care Overview  Goal: Plan of Care/Patient Progress Review  Outcome: Improving  VSS, breastfeeding well using shield, pain controlled with Tylenol and Ibuprofen, states satisfaction with pain management, up independently with no complaints of dizziness, interacting and bonding well with infant, encouraged to call with questions or concerns, will continue to monitor.

## 2017-12-22 NOTE — L&D DELIVERY NOTE
ADMISSION DIAGNOSIS:  37-5/7 weeks gestation, spontaneous labor.      PROCEDURE:  Normal spontaneous vaginal delivery over intact perineum.      OBSTETRICIAN:  Dior Sexton MD      ANESTHESIA:  Epidural.      ESTIMATED BLOOD LOSS:  50 mL.      FINDINGS:  Male infant.  Weight is pending.  The baby was vigorous, Apgars 9 and 9.  There were no complications and no sutures were required for repair.      DELIVERY PROCEDURE:  Lamar Pardo arrived at approximately 2:00 p.m.  She was 7 cm dilated [facility].  She was taken to a Labor and Delivery room immediately.  She had an IV started, and Anesthesia was present, placing an epidural.  When I walked in the door, she was complete at that time, but she really wanted an epidural, so this was placed, and we just let that site in while we got other things ready.  She received her IV antibiotics because her group B strep status was unknown.  After she was comfortable but feeling some pressure, I broke her bag of water.  The baby was a +1.  With a contraction it came down to +2, so she pushed over a couple of contractions and delivered.  The perineum was intact.  The baby had somewhat of a short cord, so the nurse held the baby while I clamped and cut the cord.  Cord blood was sent off.  The placenta came out intact.  The vagina and perineum were inspected and were intact.  Both mom and baby are doing well.  I gave her my card so she would know who delivered her baby.         DIOR SEXTON MD             D: 2017 15:43   T: 2017 06:15   MT: [id number]      Name:     LAMAR PARDO   MRN:      [id number]        Account:        [id number]   :      1995           Delivery Date:  2017      Document: [id number]

## 2017-12-22 NOTE — PROGRESS NOTES
Data: Stacia Hannon transferred to [facility] via wheelchair at 1815. Baby transferred via parent's arms.  Action: Receiving unit notified of transfer: Yes. Patient and family notified of room change. Report given to Dinorah GIL RN, at 1820. Belongings sent to receiving unit. Accompanied by Registered Nurse. Oriented patient to surroundings. Call light within reach. ID bands double-checked with receiving RN.  Response: Patient tolerated transfer and is stable.

## 2017-12-22 NOTE — PLAN OF CARE
Discharge for today changed to tomorrow due to babies GBS+ status and inability to dischare before 48 hours. Dr. Wilkerson stating that pt did not need to be seen tomorrow,  discharge order is in.

## 2017-12-22 NOTE — DISCHARGE SUMMARY
[facility] Discharge Summary    Stacia Hannon MRN# [id number]   Age: 22 year old YOB: 1995     Date of Admission:  2017  Date of Discharge::  2017  Admitting Physician:  Dior Sexton MD  Discharge Physician:  Morris Wilkerson MD, MD     [place] [facility]          Admission Diagnoses:   Indication for care in labor or delivery  Vaginal delivery          Discharge Diagnosis:   Normal spontaneous vaginal delivery  Intrauterine pregnancy at 37 weeks gestation          Procedures:   Procedure(s): Epidural       No other procedures performed during this admission           Medications Prior to Admission:     Prescriptions Prior to Admission   Medication Sig Dispense Refill Last Dose     Ferrous Sulfate (IRON SUPPLEMENT PO) Take by mouth 2 times daily (with meals)   Past Week at Unknown time     Prenatal Vit-Fe Fumarate-FA (PRENATAL MULTIVITAMIN PLUS IRON) 27-0.8 MG TABS per tablet Take 1 tablet by mouth daily   2017 at Unknown time             Discharge Medications:     Current Discharge Medication List      CONTINUE these medications which have NOT CHANGED    Details   Ferrous Sulfate (IRON SUPPLEMENT PO) Take by mouth 2 times daily (with meals)      Prenatal Vit-Fe Fumarate-FA (PRENATAL MULTIVITAMIN PLUS IRON) 27-0.8 MG TABS per tablet Take 1 tablet by mouth daily                   Consultations:   No consultations were requested during this admission          Brief History of Labor:   21 yo multip presented in active labor and had .           Hospital Course:   The patient's hospital course was unremarkable.  On discharge, her pain was well controlled. Vaginal bleeding is similar to peak menstrual flow.  Voiding without difficulty.  Ambulating well and tolerating a normal diet.  No fever.  Breastfeeding well.  Infant is stable.  No bowel movement yet.  She was discharged on post-partum day #1.    Post-partum hemoglobin:   Hemoglobin   Date Value  Ref Range Status   12/04/2017 9.5 (L) 11.7 - 15.7 g/dL Final             Discharge Instructions and Follow-Up:   Discharge diet: Regular   Discharge activity: Activity as tolerated   Discharge follow-up: Follow up with primary care provider in 6 weeks   Wound care: Drink plenty of fluids  Ice to area for comfort           Discharge Disposition:   Discharged to home      Attestation:  I have reviewed today's vital signs, notes, medications, labs and imaging.    Electronically signed by:  Morris Wilkerson MD   December 22, 2017 8:42 AM  [facility]

## 2017-12-22 NOTE — PLAN OF CARE
Problem: Patient Care Overview  Goal: Plan of Care/Patient Progress Review  Patient meeting expected goals for this shift. Using tylenol and ibuprofen for pain/comfort. Up and ambulating. Independent in all self care during shift. Working on breastfeeding  and  cares, using shield and pumping.  Positive bonding behaviors observed.  Continue to monitor and notify MD as needed.

## 2017-12-22 NOTE — PROGRESS NOTES
PP Day 1    Pt. without c/o.  Nursing well. Doesn't know if insurance covers breast pump.    BP 99/56  Pulse 63  Temp 98.5  F (36.9  C) (Oral)  Resp 16  LMP 2017 (Exact Date)  SpO2 97%  Breastfeeding? Unknown Temp (24hrs), Av.5  F (36.9  C), Min:98  F (36.7  C), Max:99.1  F (37.3  C)     Fundus firm and nontender.    Doing well. Home with instructions tonight. Order written for breast pump and told to call insurance.    Electronically signed by:  Morris Wilkerson MD   2017 8:34 AM  [facility]

## 2017-12-22 NOTE — LACTATION NOTE
Initial Lactation visit.  Recommend unlimited, frequent breast feedings: At least 8 - 12 times every 24 hours. Avoid pacifiers and supplementation with formula unless medically indicated. Explained benefits of holding baby skin on skin to help promote better breastfeeding outcomes.  This is Stacia's first time breast feeding.  Reviewed normal  feeding patterns and signs infant is getting enough.  Reviewed possible decreased supply and difficulty feeding with pacifier use.  Will revisit as needed.    Dior Wahl RN, IBCLC

## 2017-12-22 NOTE — ANESTHESIA POSTPROCEDURE EVALUATION
Patient: Stacia Hannon    * No procedures listed *    Diagnosis:* No pre-op diagnosis entered *  Diagnosis Additional Information: No value filed.    Anesthesia Type:  No value filed.    Note:  Anesthesia Post Evaluation    Patient location during evaluation: Bedside  Patient participation: Able to fully participate in evaluation  Level of consciousness: awake and alert  Pain management: adequate  Airway patency: patent  Cardiovascular status: acceptable  Respiratory status: acceptable  Hydration status: acceptable  PONV: none     Anesthetic complications: None    Comments: Patient doing well. No headaches, low back pain or residual numbness/paresthesias. Eating and drinking without difficulty. All questions answered. No concerns from patient. No anesthetic complications.           Last vitals:  Vitals:    12/21/17 2350 12/22/17 0848 12/22/17 1627   BP: 99/56 100/52 122/76   Pulse: 63 67 71   Resp: 16 16 16   Temp: 36.9  C (98.5  F) 37.1  C (98.7  F) 36.7  C (98.1  F)   SpO2:            Electronically Signed By: Mikala Vargas MD  December 22, 2017  5:18 PM

## 2017-12-22 NOTE — PLAN OF CARE
Problem: Patient Care Overview  Goal: Plan of Care/Patient Progress Review  Outcome: Therapy, progress toward functional goals as expected  Patient arrived in room 4424 around 1815.  Vital signs are stable.  Fundus is firm at the U.  Able to void and empty her bladder.  Ambulated independently.  Took ibuprofen and tylenol for discomfort.  She has been oriented to room, call light system, and infant safety procedure.  Educational folder has been introduced to patient.  Patient is encouraged to call whenever she has questions and concerns.  Will continue to monitor.

## 2017-12-23 VITALS
DIASTOLIC BLOOD PRESSURE: 59 MMHG | RESPIRATION RATE: 16 BRPM | TEMPERATURE: 98.1 F | HEART RATE: 71 BPM | SYSTOLIC BLOOD PRESSURE: 109 MMHG | OXYGEN SATURATION: 97 %

## 2017-12-23 PROCEDURE — 25000132 ZZH RX MED GY IP 250 OP 250 PS 637: Performed by: SPECIALIST

## 2017-12-23 RX ADMIN — ACETAMINOPHEN 650 MG: 325 TABLET, FILM COATED ORAL at 12:31

## 2017-12-23 RX ADMIN — IBUPROFEN 800 MG: 400 TABLET ORAL at 06:35

## 2017-12-23 RX ADMIN — IBUPROFEN 800 MG: 400 TABLET ORAL at 00:39

## 2017-12-23 RX ADMIN — ACETAMINOPHEN 650 MG: 325 TABLET, FILM COATED ORAL at 06:35

## 2017-12-23 RX ADMIN — IBUPROFEN 800 MG: 400 TABLET ORAL at 12:32

## 2017-12-23 RX ADMIN — ACETAMINOPHEN 650 MG: 325 TABLET, FILM COATED ORAL at 00:39

## 2017-12-23 NOTE — LACTATION NOTE
Routine visit. Getting ready for discharge.Outpatient resource phone numbers given. Instructed to feed frequently and pump/hand express.    Plan: Watch for feeding cues and feed every 2-3 hours and/or on demand. Continue to use feeding log to track intake and appropriate voids and stools. Take feeding log to first follow up appointment or weight check. Encourage skin to skin to promote frequent feedings, thermoregulation and bonding. Follow-up with healthcare provider or lactation consultant for questions or concerns.    No further questions at this time. Susan Gallegos RNC, IBCLC

## 2017-12-23 NOTE — PROGRESS NOTES
SW:  D: SW received consult as patient expressed that she did not have a car seat for baby or finances to obtain a car seat.  SW obtained car seat from [facility] and provided to baby's mother.  SW talked with patient regarding any other services and she feels that she has everything else that she needs.  Patient said that she lives with  who is involved and supportive to assist her.  She also has two other children at home ages 7 and 4.  Provided folder of resources for patient and offered to assist if she has any other questions or needs.  No additional concerns or needs at this time.     P: SW will continue to assist as needed for discharge.     AVIVA Patterson, LGSW

## 2017-12-23 NOTE — PLAN OF CARE
Problem: Patient Care Overview  Goal: Plan of Care/Patient Progress Review  Outcome: Improving  Vital signs stable. Patient voiding without difficulty. Able to ambulate in room free of dizziness. Taking tylenol/ibuprofen for pain management. Working on breastfeeding infant every 2-3 hours. Social work consult was placed because patient expressed concerns about being able to get a car seat for infants discharge.  Encouraged to call with questions/concerns. Will continue to monitor.

## 2017-12-23 NOTE — PLAN OF CARE
Problem: Patient Care Overview  Goal: Plan of Care/Patient Progress Review  Patient meeting expected goals for this shift. Using tylenol and ibuprofen for pain/comfort. Up and ambulating. Independent in all self care during shift. Working on breastfeeding  and  cares. Support person present at bedside and supportive.  Positive bonding behaviors observed.  Patient will be discharged home with baby during this shift.

## 2017-12-26 LAB
BACTERIA SPEC CULT: ABNORMAL
SPECIMEN SOURCE: ABNORMAL

## 2018-07-26 ENCOUNTER — HOSPITAL ENCOUNTER (EMERGENCY)
Facility: CLINIC | Age: 23
Discharge: HOME | End: 2018-07-27
Attending: NURSE PRACTITIONER | Admitting: NURSE PRACTITIONER
Payer: COMMERCIAL

## 2018-07-26 DIAGNOSIS — R20.2 PARESTHESIA: ICD-10-CM

## 2018-07-26 LAB
ANION GAP SERPL CALCULATED.3IONS-SCNC: 8 MMOL/L (ref 3–14)
BUN SERPL-MCNC: 11 MG/DL (ref 7–30)
CALCIUM SERPL-MCNC: 8.7 MG/DL (ref 8.5–10.1)
CHLORIDE SERPL-SCNC: 108 MMOL/L (ref 94–109)
CO2 SERPL-SCNC: 25 MMOL/L (ref 20–32)
CREAT SERPL-MCNC: 0.44 MG/DL (ref 0.52–1.04)
ERYTHROCYTE [DISTWIDTH] IN BLOOD BY AUTOMATED COUNT: 13.2 % (ref 10–15)
GFR SERPL CREATININE-BSD FRML MDRD: >90 ML/MIN/1.7M2
GLUCOSE BLDC GLUCOMTR-MCNC: 136 MG/DL (ref 70–99)
GLUCOSE SERPL-MCNC: 119 MG/DL (ref 70–99)
HCT VFR BLD AUTO: 36.9 % (ref 35–47)
HGB BLD-MCNC: 12.2 G/DL (ref 11.7–15.7)
MCH RBC QN AUTO: 30.1 PG (ref 26.5–33)
MCHC RBC AUTO-ENTMCNC: 33.1 G/DL (ref 31.5–36.5)
MCV RBC AUTO: 91 FL (ref 78–100)
PLATELET # BLD AUTO: 322 10E9/L (ref 150–450)
POTASSIUM SERPL-SCNC: 3.4 MMOL/L (ref 3.4–5.3)
RBC # BLD AUTO: 4.05 10E12/L (ref 3.8–5.2)
SODIUM SERPL-SCNC: 141 MMOL/L (ref 133–144)
TSH SERPL DL<=0.005 MIU/L-ACNC: 1.58 MU/L (ref 0.4–4)
WBC # BLD AUTO: 6.8 10E9/L (ref 4–11)

## 2018-07-26 PROCEDURE — 00000146 ZZHCL STATISTIC GLUCOSE BY METER IP

## 2018-07-26 PROCEDURE — 96374 THER/PROPH/DIAG INJ IV PUSH: CPT

## 2018-07-26 PROCEDURE — 85027 COMPLETE CBC AUTOMATED: CPT | Performed by: NURSE PRACTITIONER

## 2018-07-26 PROCEDURE — 80048 BASIC METABOLIC PNL TOTAL CA: CPT | Performed by: NURSE PRACTITIONER

## 2018-07-26 PROCEDURE — 96361 HYDRATE IV INFUSION ADD-ON: CPT

## 2018-07-26 PROCEDURE — 99284 EMERGENCY DEPT VISIT MOD MDM: CPT | Mod: 25

## 2018-07-26 PROCEDURE — 25000128 H RX IP 250 OP 636: Performed by: NURSE PRACTITIONER

## 2018-07-26 PROCEDURE — 84443 ASSAY THYROID STIM HORMONE: CPT | Performed by: NURSE PRACTITIONER

## 2018-07-26 RX ORDER — KETOROLAC TROMETHAMINE 15 MG/ML
15 INJECTION, SOLUTION INTRAMUSCULAR; INTRAVENOUS ONCE
Status: COMPLETED | OUTPATIENT
Start: 2018-07-26 | End: 2018-07-26

## 2018-07-26 RX ADMIN — SODIUM CHLORIDE 1000 ML: 9 INJECTION, SOLUTION INTRAVENOUS at 23:18

## 2018-07-26 RX ADMIN — KETOROLAC TROMETHAMINE 15 MG: 15 INJECTION, SOLUTION INTRAMUSCULAR; INTRAVENOUS at 23:18

## 2018-07-26 ASSESSMENT — ENCOUNTER SYMPTOMS
NUMBNESS: 1
SHORTNESS OF BREATH: 0
COUGH: 0
NAUSEA: 0
VOMITING: 0

## 2018-07-26 NOTE — ED AVS SNAPSHOT
[facility] Emergency Department    [address] [place] [address]    Phone:  [phone]    Fax:  [phone]                                       Stacia Hannon   MRN: [id number]    Department:  [facility] Emergency Department   Date of Visit:  7/26/2018           Patient Information     Date Of Birth          1995        Your diagnoses for this visit were:     Paresthesia        You were seen by Jose Moreno APRN CNP.      Follow-up Information     Follow up with [facility], [place] In 3 days.    Specialty:  Family Practice    Why:  if continuned symptoms or sooner if worsening    Contact information:    [address]   [place] [address]  [phone]          Discharge Instructions         Parestesia [Paraesthesias]  La parestesia se refiere a ratna sensación de ardor o picazón que a veces se siente en las carol, los brazos, las piernas o los pies. También se puede presentar en otras partes del cuerpo. Puede sentirse ericka entumecimiento o cosquilleo, hormigueo o picazón en la piel. En general, la sensación es indolora.  La mayoría de las personas junior sentido alguna vez la sensación de hormigueo. Esta sensación se produce cuando se corbett tenido las piernas cruzadas demasiado tiempo y se ejerce presión en el nervio. Es ratna parestesia transitoria. Desaparece rápidamente ratna vez que se baljinder la presión.  Existen muchas posibles causas para la parestesis crónica. Entre ellas, trastornos ericka un ataque cerebral, un disco herniado (presionamiento de un nervio), un nervio atrapado en el hombro o la fahad (ericka el síndrome de túnel carpiano), deficiencias de vitaminas e incluso ciertos medicamentos.  El tratamiento del trastorno depende de la causa. Es necesario realizar análisis de laboratorio para lograr un diagnóstico exacto. Estos exámenes pueden incluir análisis de gina, radiografías, tomografías computarizadas o un examen muscular  (electromiografía). Dependiendo de la causa, el tratamiento puede incluir fisioterapia.  Cuidados En [place]:  1. No realice ningún cambio en candi medicamentos sin consultar a rodgers médico.  2. Si le corbett recetado vitaminas, recuerde tomarlas todos los días en la dosis recomendada.  3. Debido a la disminución de la sensibilidad, ratna mano o un pie entumecidos pueden ser más propensos a lesionarse. Tenga cuidado de proteger estas partes del cuerpo de browne, golpes, contusiones, quemaduras u otras lesiones. Mantenga cortas candi uñas y lávese las carol y los pies con frecuencia. Use calzado que se adapte melanie para evitar puntos de presión, ampollas y úlceras. Obsérvese cuidadosamente las carol y los pies (incluida las plantas de los pies y el espacio entre los dedos de los pies) al menos ratna vez a la semana y notifique a rodgers médico si encontró ratna herida abierta o signos de infección.  Seguimiento  con rodgers médico o ericka lo indique nuestro personal. Es posible que necesite más exámenes para determinar la causa específica de rodgers parestesia.  [NOTA: Si se realizaron análisis de gina, tomografías computarizadas o electromiografías, los especialistas los revisarán. Se le notificará cualquier nuevo resultado que pueda afectar rodgers atención.]  Busque Prontamente Atención Médica  si algo de lo siguiente ocurre:    Entumecimiento o debilidad de la lisa, un brazo o ratna pierna    Problemas de dicción, confusión, problemas para hablar, caminar o ganesh    Dolor de tonio grave, episodios de desmayos, mareo o convulsiones    Dolor en el pecho, los brazos, el carley o la parte superior de la espalda    Pérdida del control de la vejiga o del intestino    Herida abierta con enrojecimiento, hinchazón o salida de pus  Date Last Reviewed: 9/25/2015 2000-2017 [employer]. [address] [place] [address], [place] [address]. Todos los derechos reservados. Esta información no pretende sustituir la atención médica profesional. Sólo rodgers médico  puede diagnosticar y tratar un problema de reno.          24 Hour Appointment Hotline       To make an appointment at any [facility], call [phone] ([phone]). If you don't have a family doctor or clinic, we will help you find one. [facility] are conveniently located to serve the needs of you and your family.             Review of your medicines      Our records show that you are taking the medicines listed below. If these are incorrect, please call your family doctor or clinic.        Dose / Directions Last dose taken    IRON SUPPLEMENT PO        Take by mouth 2 times daily (with meals)   Refills:  0        prenatal multivitamin plus iron 27-0.8 MG Tabs per tablet   Dose:  1 tablet        Take 1 tablet by mouth daily   Refills:  0                Procedures and tests performed during your visit     Basic metabolic panel    CBC (platelets, no diff)    Glucose by meter    Saline Lock IV    TSH      Orders Needing Specimen Collection     None      Pending Results     No orders found for last 3 day(s).            Pending Culture Results     No orders found for last 3 day(s).            Pending Results Instructions     If you had any lab results that were not finalized at the time of your Discharge, you can call the ED Lab Result RN at [phone]. You will be contacted by this team for any positive Lab results or changes in treatment. The nurses are available 7 days a week from 10A to 6:30P.  You can leave a message 24 hours per day and they will return your call.        Test Results From Your Hospital Stay        7/26/2018 11:07 PM      Component Results     Component Value Ref Range & Units Status    Glucose 136 (H) 70 - 99 mg/dL Final    /RN Notified         7/26/2018 11:43 PM      Component Results     Component Value Ref Range & Units Status    TSH 1.58 0.40 - 4.00 mU/L Final         7/26/2018 11:19 PM      Component Results     Component Value Ref Range & Units Status    WBC 6.8 4.0 -  11.0 10e9/L Final    RBC Count 4.05 3.8 - 5.2 10e12/L Final    Hemoglobin 12.2 11.7 - 15.7 g/dL Final    Hematocrit 36.9 35.0 - 47.0 % Final    MCV 91 78 - 100 fl Final    MCH 30.1 26.5 - 33.0 pg Final    MCHC 33.1 31.5 - 36.5 g/dL Final    RDW 13.2 10.0 - 15.0 % Final    Platelet Count 322 150 - 450 10e9/L Final         7/26/2018 11:38 PM      Component Results     Component Value Ref Range & Units Status    Sodium 141 133 - 144 mmol/L Final    Potassium 3.4 3.4 - 5.3 mmol/L Final    Chloride 108 94 - 109 mmol/L Final    Carbon Dioxide 25 20 - 32 mmol/L Final    Anion Gap 8 3 - 14 mmol/L Final    Glucose 119 (H) 70 - 99 mg/dL Final    Urea Nitrogen 11 7 - 30 mg/dL Final    Creatinine 0.44 (L) 0.52 - 1.04 mg/dL Final    GFR Estimate >90 >60 mL/min/1.7m2 Final    Non  GFR Calc    GFR Estimate If Black >90 >60 mL/min/1.7m2 Final    African American GFR Calc    Calcium 8.7 8.5 - 10.1 mg/dL Final                Clinical Quality Measure: Blood Pressure Screening     Your blood pressure was checked while you were in the emergency department today. The last reading we obtained was  BP: 111/81 . Please read the guidelines below about what these numbers mean and what you should do about them.  If your systolic blood pressure (the top number) is less than 120 and your diastolic blood pressure (the bottom number) is less than 80, then your blood pressure is normal. There is nothing more that you need to do about it.  If your systolic blood pressure (the top number) is 120-139 or your diastolic blood pressure (the bottom number) is 80-89, your blood pressure may be higher than it should be. You should have your blood pressure rechecked within a year by a primary care provider.  If your systolic blood pressure (the top number) is 140 or greater or your diastolic blood pressure (the bottom number) is 90 or greater, you may have high blood pressure. High blood pressure is treatable, but if left untreated over time  "it can put you at risk for heart attack, stroke, or kidney failure. You should have your blood pressure rechecked by a primary care provider within the next 4 weeks.  If your provider in the emergency department today gave you specific instructions to follow-up with your doctor or provider even sooner than that, you should follow that instruction and not wait for up to 4 weeks for your follow-up visit.        Thank you for choosing [facility]       Thank you for choosing [facility] for your care. Our goal is always to provide you with excellent care. Hearing back from our patients is one way we can continue to improve our services. Please take a few minutes to complete the written survey that you may receive in the mail after you visit with us. Thank you!        [employer] ticketbroker Information     [employer] lets you send messages to your doctor, view your test results, renew your prescriptions, schedule appointments and more. To sign up, go to [email or web address] . Click on \"Log in\" on the left side of the screen, which will take you to the Welcome page. Then click on \"Sign up Now\" on the right side of the page.     You will be asked to enter the access code listed below, as well as some personal information. Please follow the directions to create your username and password.     Your access code is: [email or web address]  Expires: 10/25/2018 12:14 AM     Your access code will  in 90 days. If you need help or a new code, please call your [facility] clinic or [phone].        Care EveryWhere ID     This is your Care EveryWhere ID. This could be used by other organizations to access your [facility] medical records  [id number]        Equal Access to Services     [facility]: Hadii anton Arevalo, waaxda alexi, qaybta jewels mustafa. So [facility] [phone].    ATENCIÓN: Si habla español, tiene a rodgers disposición servicios gratuitos de asistencia lingüística. Llame al " [phone].    We comply with applicable federal civil rights laws and [place] laws. We do not discriminate on the basis of race, color, national origin, age, disability, sex, sexual orientation, or gender identity.            After Visit Summary       This is your record. Keep this with you and show to your community pharmacist(s) and doctor(s) at your next visit.

## 2018-07-26 NOTE — ED AVS SNAPSHOT
[facility] Emergency Department    [address]    [facility] [address]    Phone:  [phone]    Fax:  [phone]                                       Stacia Hannon   MRN: [id number]    Department:  [facility] Emergency Department   Date of Visit:  7/26/2018           After Visit Summary Signature Page     I have received my discharge instructions, and my questions have been answered. I have discussed any challenges I see with this plan with the nurse or doctor.    ..........................................................................................................................................  Patient/Patient Representative Signature      ..........................................................................................................................................  Patient Representative Print Name and Relationship to Patient    ..................................................               ................................................  Date                                            Time    ..........................................................................................................................................  Reviewed by Signature/Title    ...................................................              ..............................................  Date                                                            Time

## 2018-07-27 VITALS
TEMPERATURE: 98.8 F | RESPIRATION RATE: 18 BRPM | OXYGEN SATURATION: 100 % | HEART RATE: 65 BPM | SYSTOLIC BLOOD PRESSURE: 111 MMHG | DIASTOLIC BLOOD PRESSURE: 81 MMHG

## 2018-07-27 NOTE — ED PROVIDER NOTES
History     Chief Complaint:  Numbness    HPI   Stacia Hannon is a generally healthy 22 year old female who presents with numbness. The patient states that she had developed numbness in her face bilaterally, neck, and left arm while playing with her son this evening, prompting her ED visit. Here, the patient states that the numbness is now predominately in the face and neck. She denies any chest pain, shortness of breath, difficulty breathing, or fast breathing with onset. She additionally denies any nausea, vomiting, vision changes, nasal congestion, or dental pain. Of note, the patient is 7 months post partum with her third child. She denies any complications with the pregnancies.       Allergies:  NKDA     Medications:    Iron    Past Medical History:    Pancreatitis    Past Surgical History:    Cholecystectomy with cholangiograms    Family History:    No past pertinent family history.     Social History:  Negative for alcohol use.  Negative for tobacco use.   Marital Status:  Single [1]     Review of Systems   HENT: Negative for congestion and dental problem.    Respiratory: Negative for cough and shortness of breath.    Cardiovascular: Negative for chest pain.   Gastrointestinal: Negative for nausea and vomiting.   Neurological: Positive for numbness.   All other systems reviewed and are negative.      Physical Exam   First Vitals:  BP: 131/82  Heart Rate: 65  Temp: 98.8  F (37.1  C)  Resp: 18  SpO2: 98 %    Physical Exam  General: Alert, No obvious discomfort, well kept  Eyes: PERRL, conjunctivae pink no scleral icterus or conjunctival injection  ENT:   Moist mucus membranes, posterior oropharynx clear without erythema or exudates, No lymphadenopathy, Normal voice  Resp:  Lungs clear to auscultation bilaterally, no crackles/rubs/wheezes. Good air movement  CV:  Normal rate and rhythm, no murmurs/rubs/gallops  GI:  Abdomen soft and non-distended.  Normoactive BS.  No tenderness, guarding or rebound, No  masses  Skin:  Warm, dry.  No rashes or petechiae  Musculoskeletal: No peripheral edema or calf tenderness, Normal gross ROM   Neuro: Alert and oriented to person/place/time, normal sensation  Psychiatric: Normal affect, cooperative, good eye contact    Emergency Department Course   Laboratory:  CBC: WBC: 6.8, HGB: 12.2, PLT: 322  BMP: Glucose 119 (H), Creatinine: 0.44 (L), o/w WNL    TSH: 1.58  Glucose by meter: 136 (H)    Interventions:  2318 0.9% Sodium Chloride Bolus, 1L, IV  2318 Toradol, 15 mg, IV     Emergency Department Course:  Nursing notes and vitals reviewed.     2302  I performed an exam of the patient as documented above.     IV inserted. Medicine administered as documented above. Blood drawn. This was sent to the lab for further testing, results above.    0007 I rechecked the patient and discussed the results of her workup thus far.     Findings and plan explained to the Patient. Patient discharged home with instructions regarding supportive care, medications, and reasons to return. The importance of close follow-up was reviewed.    I personally reviewed the laboratory results with the Patient and answered all related questions prior to discharge.       Impression & Plan      Medical Decision Making:  Stacia Hannon is a 22 year old female who presents today for evaluation of facial paresthesia.  She stated that she was lying in bed when she started to feel her lips and cheeks and hands become numb.  She denies any rapid breathing.  She denies any other neurologic findings.  Her examination showed no focal neurologic deficit and only subjective paresthesia.  Laboratory studies are noncontributory.  Glucose slightly elevated at 136.  There is no indication for advanced imagery or further testing.  I doubt PE, ACS, or CVA given this scenario.  Symptoms are most consistent with hyperventilation.  Patient appears to be safe and appropriate for outpatient management and discharge.      Diagnosis:     ICD-10-CM   1. Paresthesia R20.2       Disposition:  discharged to home    I, Evelyn Gatica, am serving as a scribe on 7/26/2018 at 11:02 PM to personally document services performed by Jose Moreno APRNCNP based on my observations and the provider's statements to me.      Evelyn Gatica  7/26/2018   [facility] EMERGENCY DEPARTMENT       Jose Moreno APRN CNP  07/27/18 0019

## 2018-07-27 NOTE — ED TRIAGE NOTES
Pt arrives with numbness that started 1 hour ago. Pt states it started with her lips becoming numb, now her cheeks, bilateral and hands. Denies HA, dizziness, CP, SOB, N/V.

## 2019-01-23 ENCOUNTER — HOSPITAL ENCOUNTER (EMERGENCY)
Facility: CLINIC | Age: 24
Discharge: HOME | End: 2019-01-23
Attending: EMERGENCY MEDICINE | Admitting: EMERGENCY MEDICINE
Payer: COMMERCIAL

## 2019-01-23 ENCOUNTER — APPOINTMENT (OUTPATIENT)
Dept: ULTRASOUND IMAGING | Facility: CLINIC | Age: 24
End: 2019-01-23
Payer: COMMERCIAL

## 2019-01-23 VITALS
WEIGHT: 176.59 LBS | DIASTOLIC BLOOD PRESSURE: 53 MMHG | OXYGEN SATURATION: 100 % | BODY MASS INDEX: 31.29 KG/M2 | RESPIRATION RATE: 18 BRPM | TEMPERATURE: 97 F | HEIGHT: 63 IN | SYSTOLIC BLOOD PRESSURE: 103 MMHG | HEART RATE: 68 BPM

## 2019-01-23 DIAGNOSIS — R10.2 PELVIC PAIN IN FEMALE: ICD-10-CM

## 2019-01-23 LAB
ALBUMIN UR-MCNC: NEGATIVE MG/DL
APPEARANCE UR: CLEAR
BILIRUB UR QL STRIP: NEGATIVE
COLOR UR AUTO: ABNORMAL
GLUCOSE UR STRIP-MCNC: NEGATIVE MG/DL
HCG UR QL: NEGATIVE
HGB UR QL STRIP: NEGATIVE
KETONES UR STRIP-MCNC: NEGATIVE MG/DL
LEUKOCYTE ESTERASE UR QL STRIP: NEGATIVE
MUCOUS THREADS #/AREA URNS LPF: PRESENT /LPF
NITRATE UR QL: NEGATIVE
PH UR STRIP: 6 PH (ref 5–7)
RBC #/AREA URNS AUTO: <1 /HPF (ref 0–2)
SOURCE: ABNORMAL
SP GR UR STRIP: 1.01 (ref 1–1.03)
SPECIMEN SOURCE: NORMAL
SQUAMOUS #/AREA URNS AUTO: 1 /HPF (ref 0–1)
UROBILINOGEN UR STRIP-MCNC: 0 MG/DL (ref 0–2)
WBC #/AREA URNS AUTO: <1 /HPF (ref 0–5)
WET PREP SPEC: NORMAL

## 2019-01-23 PROCEDURE — 81025 URINE PREGNANCY TEST: CPT | Performed by: EMERGENCY MEDICINE

## 2019-01-23 PROCEDURE — 87210 SMEAR WET MOUNT SALINE/INK: CPT | Performed by: EMERGENCY MEDICINE

## 2019-01-23 PROCEDURE — 25000132 ZZH RX MED GY IP 250 OP 250 PS 637: Performed by: EMERGENCY MEDICINE

## 2019-01-23 PROCEDURE — 87491 CHLMYD TRACH DNA AMP PROBE: CPT | Performed by: EMERGENCY MEDICINE

## 2019-01-23 PROCEDURE — 99284 EMERGENCY DEPT VISIT MOD MDM: CPT | Mod: 25

## 2019-01-23 PROCEDURE — 81001 URINALYSIS AUTO W/SCOPE: CPT | Performed by: EMERGENCY MEDICINE

## 2019-01-23 PROCEDURE — 87591 N.GONORRHOEAE DNA AMP PROB: CPT | Performed by: EMERGENCY MEDICINE

## 2019-01-23 PROCEDURE — 93976 VASCULAR STUDY: CPT

## 2019-01-23 RX ORDER — IBUPROFEN 800 MG/1
800 TABLET, FILM COATED ORAL EVERY 8 HOURS PRN
Qty: 30 TABLET | Refills: 0 | Status: SHIPPED | OUTPATIENT
Start: 2019-01-23 | End: 2019-01-31

## 2019-01-23 RX ORDER — OXYCODONE HYDROCHLORIDE 5 MG/1
10 TABLET ORAL ONCE
Status: COMPLETED | OUTPATIENT
Start: 2019-01-23 | End: 2019-01-23

## 2019-01-23 RX ADMIN — OXYCODONE HYDROCHLORIDE 10 MG: 5 TABLET ORAL at 08:41

## 2019-01-23 ASSESSMENT — ENCOUNTER SYMPTOMS
FREQUENCY: 0
DIARRHEA: 0
DIFFICULTY URINATING: 0
DYSURIA: 0
NAUSEA: 0
CONSTIPATION: 0
VOMITING: 0
FEVER: 0
HEMATURIA: 0

## 2019-01-23 ASSESSMENT — MIFFLIN-ST. JEOR: SCORE: 1525.13

## 2019-01-23 NOTE — ED PROVIDER NOTES
History     Chief Complaint:  Pelvic Pain    HPI   Stacia Hannon is a 23 year old female  who presents with pelvic pain. The patient had an IUD placed about 1.5 months ago. At the time of placement the patient had significant discomfort and bleeding. This bleeding lasted for 3 weeks and it was estimated that she went through about 60 pads/week. The patient was then seen in Urgent Care 2 weeks ago on 19 for several days of dizziness with standing and generalized weakness. Her IUD was then removed by her gynecologist. At that time the patient noted having some intermittent cramping, but no pelvic or abdominal pain. Last week the patient states she was bending down to pick something up when she suddenly developed a sharp pain in her pelvis. Since then, the patient reports having intermittent pelvic pain that feels like contractions and is most severe in her left lower pelvis and radiates towards the middle of her pelvis. This pain increases in severity with sexual intercourse and the act of sitting down. Today the patient reports her pelvic pain has been constant causing her to report to the ED currently. The patient denies any fevers, nausea, vomiting, abnormal vaginal bleeding or discharge, diarrhea, constipation or any urinary symptoms. The patient has found some improvement in her pain with Tylenol. Her OBGYN is Dr. Max Marrero.     Allergies:  No known drug allergies     Medications:    Iron Supplement PO  Prenatal Multivitamin Plus Iron    Past Medical History:    Anemia complicating pregnancy in third trimester  Pancreatitis    Past Surgical History:    Laparoscopic cholecystectomy with Cholangiograms    Family History:    History reviewed. No pertinent family history.     Social History:  Smoking Status: Never Smoker  Alcohol Use: No  Patient presents with her .  Marital Status:  Single     Review of Systems   Constitutional: Negative for fever.   Gastrointestinal: Negative for  "constipation, diarrhea, nausea and vomiting.   Genitourinary: Positive for pelvic pain. Negative for difficulty urinating, dysuria, frequency, hematuria, urgency, vaginal bleeding and vaginal discharge.   All other systems reviewed and are negative.    Physical Exam     Patient Vitals for the past 24 hrs:   BP Temp Temp src Pulse Resp SpO2 Height Weight   01/23/19 0825 103/53 97  F (36.1  C) Temporal 68 18 100 % 1.6 m (5' 3\") 80.1 kg (176 lb 9.4 oz)     Physical Exam    Constitutional:  Pleasant, age appropriate.       Resting comfortably in the bed.  HEENT:    Oropharynx is moist  Eyes:    Conjunctiva normal  Neck:    Supple, no meningismus.     CV:     Regular rate and rhythm.      No murmurs, rubs or gallops.     No lower extremity edema.  PULM:    Clear to auscultation bilateral.       No respiratory distress.      Good air exchange.  ABD:    Soft, non-distended.       Mild tenderness in the LLQ > midline low abdomen.     Bowel sounds normal.     No pulsatile masses.       No rebound, guarding or rigidity.     No CVA tenderness.   :    Normal external genitalia.     No perineal lesions.     No blood in the in the vaginal vault.     Mild amount of clumpy white vaginal discharge.     No cervical motion tenderness.     Left adnexal tenderness.     No adnexal mass.  MSK:     No gross deformity to all four extremities.   LYMPH:   No cervical lymphadenopathy.  NEURO:   Alert.  Good muscular tone, no atrophy.   Skin:    Warm, dry and intact.    Psych:    Mood is good and affect is appropriate.    Emergency Department Course     Imaging:  Radiographic findings were communicated with the patient who voiced understanding of the findings.    US Pelvic Complete w Transvaginal & Abd/Pel Duplex Limited  1. Thickened endometrium likely relates to stage of the menstrual  cycle.  2. No definite acute abnormality.  As read by radiology.    Laboratory:    HCG qualitative Urine: Negative    Wet prep: No PMNs seen. No Trichomonas " seen. No clue cells seen. No yeast seen.    UA: Mucous Urine Present (A), o/w Negative    Neisseria gonorrhoeae PCR: Pending    Chlamydia trachomatis PCR: Pending    Interventions:    0841: Roxicodone 10 mg PO    Emergency Department Course:  Past medical records, nursing notes, and vitals reviewed.  0831: I performed an exam of the patient and obtained history, as documented above.    Labs were sent.    1057: I rechecked the patient. Findings and plan explained to the Patient. Patient discharged home with instructions regarding supportive care, medications, and reasons to return. The importance of close follow-up was reviewed.     Impression & Plan      Medical Decision Makin-year-old female presents the ED with a week and half of left lower quadrant abdominal/pelvic discomfort.  She has no signs of vaginitis, cervicitis or PID.  Urinalysis unremarkable.  No evidence of hematuria to draw increased concern for ureteral stone.  History is not suggestive of ureteral stone to indicate CT scan of the abdomen.  Pelvic ultrasound reveals no evidence of ovarian cyst/torsion or alternative pathology.  Differential diagnosis for her pain would include abdominal wall strain, viral illness, endometriosis. Very low suspicion for diverticulitis. Radiation risk of CT outweighs benefit.  Patient safe for discharge home with with supportive measures and close follow-up with gynecology if symptoms not improved.    Diagnosis:    ICD-10-CM    1. Pelvic pain in female R10.2        Disposition:  Discharged to home.    Discharge Medications:     Medication List      Started    ibuprofen 800 MG tablet  Commonly known as:  ADVIL/MOTRIN  800 mg, Oral, EVERY 8 HOURS PRN              Natacha Burdick  2019   [facility] EMERGENCY DEPARTMENT  Natacha GATES, am serving as a scribe at 8:31 AM on 2019 to document services personally performed by Galileo Coello MD based on my observations and the provider's  statements to me.        Galileo Coello MD  01/23/19 1102

## 2019-01-23 NOTE — ED AVS SNAPSHOT
[facility] Emergency Department  [address]  [facility] [address]  Phone:  [phone]  Fax:  [phone]                                    Stacia Hannon   MRN: [id number]    Department:  [facility] Emergency Department   Date of Visit:  1/23/2019           After Visit Summary Signature Page    I have received my discharge instructions, and my questions have been answered. I have discussed any challenges I see with this plan with the nurse or doctor.    ..........................................................................................................................................  Patient/Patient Representative Signature      ..........................................................................................................................................  Patient Representative Print Name and Relationship to Patient    ..................................................               ................................................  Date                                   Time    ..........................................................................................................................................  Reviewed by Signature/Title    ...................................................              ..............................................  Date                                               Time          22EPIC Rev 08/18       
Airway patent, Nasal mucosa clear. Mouth with normal mucosa.

## 2019-01-23 NOTE — ED TRIAGE NOTES
Patient called and advised per Dr Conor Arndt to continue current regimen, patient verbalized understanding Patient presents to ED with pelvic & left ovary pain that started 3 days.  Denies urinary symptoms. LMP 2 weeks ago. Denies nausea vomiting diarrhea. ABCs intact.

## 2019-01-24 LAB
C TRACH DNA SPEC QL NAA+PROBE: NEGATIVE
N GONORRHOEA DNA SPEC QL NAA+PROBE: NEGATIVE
SPECIMEN SOURCE: NORMAL
SPECIMEN SOURCE: NORMAL

## 2019-01-24 NOTE — RESULT ENCOUNTER NOTE
Final result for both N. Gonorrhoeae PCR and Chlamydia Trachomatis PCR are NEGATIVE.  No treatment or change in treatment per [place] ED Lab Result protocol.

## 2019-06-03 ENCOUNTER — HOSPITAL ENCOUNTER (EMERGENCY)
Facility: CLINIC | Age: 24
Discharge: HOME | End: 2019-06-03
Attending: EMERGENCY MEDICINE | Admitting: EMERGENCY MEDICINE
Payer: COMMERCIAL

## 2019-06-03 ENCOUNTER — APPOINTMENT (OUTPATIENT)
Dept: GENERAL RADIOLOGY | Facility: CLINIC | Age: 24
End: 2019-06-03
Attending: EMERGENCY MEDICINE
Payer: COMMERCIAL

## 2019-06-03 VITALS
RESPIRATION RATE: 16 BRPM | TEMPERATURE: 98.4 F | DIASTOLIC BLOOD PRESSURE: 57 MMHG | SYSTOLIC BLOOD PRESSURE: 104 MMHG | OXYGEN SATURATION: 100 % | HEART RATE: 61 BPM

## 2019-06-03 DIAGNOSIS — R20.0 NUMBNESS OF FINGERS: ICD-10-CM

## 2019-06-03 DIAGNOSIS — S61.421A LACERATION OF RIGHT HAND WITH FOREIGN BODY, INITIAL ENCOUNTER: ICD-10-CM

## 2019-06-03 PROCEDURE — 10120 INC&RMVL FB SUBQ TISS SMPL: CPT

## 2019-06-03 PROCEDURE — 25000132 ZZH RX MED GY IP 250 OP 250 PS 637: Performed by: EMERGENCY MEDICINE

## 2019-06-03 PROCEDURE — 12002 RPR S/N/AX/GEN/TRNK2.6-7.5CM: CPT

## 2019-06-03 PROCEDURE — 73130 X-RAY EXAM OF HAND: CPT | Mod: RT

## 2019-06-03 PROCEDURE — 99283 EMERGENCY DEPT VISIT LOW MDM: CPT | Mod: 25

## 2019-06-03 RX ORDER — IBUPROFEN 600 MG/1
600 TABLET, FILM COATED ORAL ONCE
Status: COMPLETED | OUTPATIENT
Start: 2019-06-03 | End: 2019-06-03

## 2019-06-03 RX ORDER — IBUPROFEN 600 MG/1
600 TABLET, FILM COATED ORAL EVERY 8 HOURS PRN
Qty: 30 TABLET | Refills: 0 | Status: SHIPPED | OUTPATIENT
Start: 2019-06-03 | End: 2019-07-03

## 2019-06-03 RX ORDER — LIDOCAINE HYDROCHLORIDE AND EPINEPHRINE 10; 10 MG/ML; UG/ML
INJECTION, SOLUTION INFILTRATION; PERINEURAL
Status: DISCONTINUED
Start: 2019-06-03 | End: 2019-06-03 | Stop reason: HOSPADM

## 2019-06-03 RX ADMIN — IBUPROFEN 600 MG: 600 TABLET ORAL at 13:52

## 2019-06-03 ASSESSMENT — ENCOUNTER SYMPTOMS: WOUND: 1

## 2019-06-03 NOTE — ED NOTES
Bed: ED12  Expected date: 6/3/19  Expected time: 12:55 PM  Means of arrival: Ambulance  Comments:  [email or web address]

## 2019-06-03 NOTE — ED TRIAGE NOTES
Patient at home washing dishes and a glass broke and cut her hand. Laceration to right palm. Active bleeding upon arrival, wrapped and controlled enroute. Unknowm Tetanus status.

## 2019-06-03 NOTE — ED AVS SNAPSHOT
[facility] Emergency Department  [address]  [facility] [address]  Phone:  [phone]  Fax:  [phone]                                    Stacia Hannon   MRN: [id number]    Department:  [facility] Emergency Department   Date of Visit:  6/3/2019           After Visit Summary Signature Page    I have received my discharge instructions, and my questions have been answered. I have discussed any challenges I see with this plan with the nurse or doctor.    ..........................................................................................................................................  Patient/Patient Representative Signature      ..........................................................................................................................................  Patient Representative Print Name and Relationship to Patient    ..................................................               ................................................  Date                                   Time    ..........................................................................................................................................  Reviewed by Signature/Title    ...................................................              ..............................................  Date                                               Time          22EPIC Rev 08/18

## 2019-06-03 NOTE — LETTER
[place] 3, 2019      To Whom It May Concern:      Stacia Hannon was seen in our Emergency Department today, 06/03/19.  I expect her condition to improve over the next 1-2 days.  She may return to work when improved. She must wear finger splint at all times until cleared by the hand specialist. She should limit any repetitive or strenuous activities with the right hand until cleared for full duty.      Sincerely,        Sheldon Ruffin MD

## 2019-06-03 NOTE — ED PROVIDER NOTES
History     Chief Complaint:  Laceration    The history is provided by the patient.      Stacia Hannon is a right-handed 23 year old female who presents with a right hand laceration via EMS. About an hour ago, patient was washing a glass cup with a sponge when it broke and cut her right hand. Patient continued to bleed and has numbness in her 4th and 5th digits, prompting her to call EMS. Patient's hand was wrapped by EMS prior to arrival.  The patient reports ongoing numbness to her entire hand as well as difficulty moving her fingers.  She denies any other injuries.  Bleeding is now controlled.  She denies any other recent changes to her health.  No history of diabetes or poor wound healing.  Patient reports she otherwise feels well and denies any other symptoms.    Last tetanus booster: 2017.    Allergies:  NKDA    Medications:    Ferrous sulfate  Prenatal multivitamin plus iron.  Levonorgestrel-Ethinyl Estrad     Past Medical History:   Anemia     Iron deficiency anemia  Vaginal delivery  Abdominal pain complicating pregnancy  Fall  Pancreatitis  Vitamin D deficiency  Obesity    Past Surgical History:    Laparoscopic cholecystectomy     Family History:    Diabetes  Heart disease (mother)  Hyperlipidemia (mother)  Stroke (mother)    Social History:  Negative for tobacco use.  Negative for alcohol use.  Negative for drug use.  Marital Status:  .    Review of Systems   Constitutional: Negative for chills and fever.   Respiratory: Negative for shortness of breath.    Cardiovascular: Negative for chest pain.   Skin: Positive for wound (Right hand.).   Neurological: Positive for numbness.   All other systems reviewed and are negative.      Physical Exam     Patient Vitals for the past 24 hrs:   BP Temp Temp src Pulse Resp SpO2   19 1616 -- -- -- -- 16 --   19 1500 -- -- -- -- -- 100 %   19 1445 104/57 -- -- -- -- --   19 1345 -- -- -- -- -- 99 %   19 1330 -- -- -- --  "-- 99 %   06/03/19 1315 105/64 98.4  F (36.9  C) Oral 61 18 99 %     Physical Exam  General: Resting comfortably. Well appearing, nontoxic.   Head:  Scalp, face, and head appear normal  Eyes:  Pupils equal, round    Conjunctivae noninjected and sclera white  ENT:    The nose is normal    Ears/pinnae are normal  Neck:  Normal range of motion  CV:  RRR, no M/R/G  Resp:  CTAB, no increased WOB   MSK:  4cm \"U\" shaped laceration with tissue flap to the medial right hand.  This is a full-thickness laceration involving the subcutaneous and a very small amount of underlying muscle tissue.  Patient reports decreased sensation to pinprick over the entire hand and fingertips.  Range of motion is limited by patient's pain.  She reports that she is unable to move any of her fingers secondary to pain despite extensive attempts at coaching and reassurance.  She has full passive range of motion of all digits of the right hand without increasing pain.  No active bleeding.  The remainder of the bilateral upper extremities are atraumatic and without any other wounds or injuries.  Skin:  No rash or lesions noted.  Laceration as noted above.  Neuro:  Speech is normal and fluent    Moves all extremities spontaneously  Psych: Awake, Alert. Normal affect      Appropriate interactions           Emergency Department Course   Imaging:  Radiographic findings were communicated with the patient who voiced understanding of the findings.    XR Hand Right G/E 3 Views   Final Result   IMPRESSION: Tiny foreign body.      ARISTIDES PETERSON MD        Procedures:       Laceration Repair      LACERATION:  A simple, subcutaneous and muscle minimally Contaminated 4 cm laceration.    LOCATION:  Medial right hand.    FUNCTION: Decreased sensation to pin prick and diffulty with flextion and extension of 5th digit.     ANESTHESIA:  Local using 1% lidcaine epin total of 5 mLs.    PREPARATION:  Irrigation and Scrubbing with Normal Saline and Shur " Lorna.    DEBRIDEMENT:  wound explored and foreign body(ies) removed 2 mm glass fragment.  The wound was copiously irrigated and no further foreign bodies were seen or palpated in a bloodless field.    CLOSURE:  Wound was closed with One Layer.  Skin closed with 8 x 4.0 Ethylon using interrupted sutures.     Splint Placement    PLACEMENT: Aluminum foam splint was applied to the right hand and after placement I checked and adjusted the fit to ensure proper positioning. The patient was more comfortable with the splint in place. Sensation and circulation are intact after splint placement.     Interventions:  1352 Ibuprofen tablet 600 mg PO    Emergency Department Course:  1319 Nursing notes and vitals reviewed. I performed an exam of the patient as documented above.     Medicine administered as documented above.     The patient was sent for a right hand x-ray while in the emergency department, findings above.     1518 I performed a hand laceration procedure on the patient. See note above.    1604 I placed an aluminium foam splint on the patient. See note above.    Findings and plan explained to the Patient. Patient discharged home with instructions regarding supportive care, medications, and reasons to return. The importance of close follow-up was reviewed. The patient was prescribed ibuprofen.    I personally reviewed and answered all related questions prior to discharge.     Impression & Plan    Medical Decision Making:  Stacia Hannon is a 23 year old female presented to the Emergency Department with a laceration. Given the time of the injury, the wound was felt amenable to primary closure.  After adequate anesthesia was obtained, the wound was thoroughly irrigated and examined.  Radiographs were obtained and suspicious for very small foreign body in the soft tissues.  Patient's hand examination was limited as she reported diffuse numbness and inability to move her hand whatsoever.  The laceration does involve the  deep layers of the soft tissue but does not of all involve any tendon, ligamentous or vascular structures.  Anatomically there should be no injury to any of the nerves involving the remainder of the hand.  It is possible she may have nerve injury related to the fifth digit on the medial aspect but I would not expect any muscle weakness or sensory changes to the remainder of the hand.  I feel that the subjective changes will resolve quickly with time and are likely secondary to patient's pain.    The wound was very carefully explored.  The foreign body that was visualized on the radiograph was identified and removed.  This represented a small 2 mm fragment of glass.  No other foreign bodies were seen or palpated in the wound bed.  The flap was closed as noted above.  The fifth finger was then splinted after bandage was applied.  Patient reported improvement in sensation following wound closure.  Given my inability to obtain a reliable strength and neurologic examination patient will require close follow-up.  I recommended that she follow-up closely with hand specialist at [facility] in 3 to 5 days for reassessment and further intervention if any of her strength or sensory changes persist.  Sutures should remain in place for 10 to 14 days or as otherwise directed by the hand specialist.    Return precautions were discussed with patient. The patient's questions were answered and the patient was agreeable with discharge.     Diagnosis:    ICD-10-CM    1. Laceration of right hand with foreign body, initial encounter S61.421A    2. Numbness of fingers R20.0      Disposition:  discharged to home    Discharge Medications:     Medication List      Started    ibuprofen 600 MG tablet  Commonly known as:  ADVIL/MOTRIN  600 mg, Oral, EVERY 8 HOURS PRN            Scribe Disposition  I, Lorena Villagran, am serving as a scribe on 6/3/2019 at 4:44 PM to personally document services performed by Sheldon Matute MD based on my  observations and the provider's statements to me.     Lorena Villagran  6/3/2019   [facility] EMERGENCY DEPARTMENT       Sheldon Ruffin MD  06/04/19 1111

## 2019-06-04 ASSESSMENT — ENCOUNTER SYMPTOMS
CHILLS: 0
SHORTNESS OF BREATH: 0
NUMBNESS: 1
FEVER: 0

## 2019-08-07 ENCOUNTER — HOSPITAL ENCOUNTER (EMERGENCY)
Facility: CLINIC | Age: 24
Discharge: HOME | End: 2019-08-07
Attending: EMERGENCY MEDICINE | Admitting: EMERGENCY MEDICINE
Payer: COMMERCIAL

## 2019-08-07 VITALS
DIASTOLIC BLOOD PRESSURE: 72 MMHG | OXYGEN SATURATION: 100 % | TEMPERATURE: 98.4 F | SYSTOLIC BLOOD PRESSURE: 121 MMHG | RESPIRATION RATE: 18 BRPM

## 2019-08-07 DIAGNOSIS — N64.4 BREAST PAIN: ICD-10-CM

## 2019-08-07 LAB — HCG UR QL: NEGATIVE

## 2019-08-07 PROCEDURE — 81025 URINE PREGNANCY TEST: CPT | Performed by: EMERGENCY MEDICINE

## 2019-08-07 PROCEDURE — 25000132 ZZH RX MED GY IP 250 OP 250 PS 637: Performed by: EMERGENCY MEDICINE

## 2019-08-07 PROCEDURE — 99283 EMERGENCY DEPT VISIT LOW MDM: CPT

## 2019-08-07 RX ORDER — IBUPROFEN 600 MG/1
600 TABLET, FILM COATED ORAL EVERY 6 HOURS PRN
Qty: 20 TABLET | Refills: 0 | Status: SHIPPED | OUTPATIENT
Start: 2019-08-07 | End: 2022-03-09

## 2019-08-07 RX ORDER — IBUPROFEN 600 MG/1
600 TABLET, FILM COATED ORAL ONCE
Status: COMPLETED | OUTPATIENT
Start: 2019-08-07 | End: 2019-08-07

## 2019-08-07 RX ADMIN — IBUPROFEN 600 MG: 600 TABLET ORAL at 19:14

## 2019-08-07 ASSESSMENT — ENCOUNTER SYMPTOMS
NAUSEA: 0
FEVER: 0
VOMITING: 0

## 2019-08-07 NOTE — ED AVS SNAPSHOT
[facility] Emergency Department  [address]  [facility] [address]  Phone:  [phone]  Fax:  [phone]                                    Stacia Hannon   MRN: [id number]    Department:  [facility] Emergency Department   Date of Visit:  8/7/2019           After Visit Summary Signature Page    I have received my discharge instructions, and my questions have been answered. I have discussed any challenges I see with this plan with the nurse or doctor.    ..........................................................................................................................................  Patient/Patient Representative Signature      ..........................................................................................................................................  Patient Representative Print Name and Relationship to Patient    ..................................................               ................................................  Date                                   Time    ..........................................................................................................................................  Reviewed by Signature/Title    ...................................................              ..............................................  Date                                               Time          22EPIC Rev 08/18

## 2019-08-07 NOTE — ED TRIAGE NOTES
"Reports \"bump for the past month and the size of it is growing and painful\"    Denies fever, redness     Breasts feeds   "

## 2019-08-08 NOTE — DISCHARGE INSTRUCTIONS
Return for fever, worsening redness, nipple discharge.  Plan to obtain outpatient breast ultrasound

## 2019-08-08 NOTE — ED NOTES
A/O. VSS. . Pt verbalized understanding of d/c instructions and ambulated to lobby steady gait.   Script rx ibuprofen given to pt at time of d/c

## 2019-08-08 NOTE — ED PROVIDER NOTES
History     Chief Complaint:  Lump on breast    HPI   Stacia Hannon is a 23 year old female who presents with lump on left breast. The patient states that she has had a lump for about a month and states that it has been growing and is now painful. The patient states that today it felt very painful and that she couldn't get into her primary care physician. The patient denies any fever, redness to the breast, nipple discharge, nausea, vomiting. The patient states that she is currently breast feeding and that she has a history of clogged ducts.     Allergies:  No Known Allergies     Medications:    The patient is not currently taking any prescribed medications.    Past Medical History:    Anemia  pancreatitis    Past Surgical History:    Cholecystectomy     Family History:    No past pertinent family history.    Social History:  Smoking Status: Never Smoker  Smokeless Tobacco: Never Used  Alcohol Use: Positive  Marital Status:      Review of Systems   Constitutional: Negative for fever.   Gastrointestinal: Negative for nausea and vomiting.   Musculoskeletal:        Breast pain   All other systems reviewed and are negative.      Physical Exam     Patient Vitals for the past 24 hrs:   BP Temp Temp src Heart Rate Resp SpO2   08/07/19 1848 121/72 98.4  F (36.9  C) Temporal 76 18 100 %         Physical Exam  Nursing note and vitals reviewed.  Constitutional: Well nourished. Resting comfortably.   Eyes: Conjunctiva normal.  Pupils are equal, round, and reactive to light.   ENT: Nose normal. Mucous membranes pink and moist.    Neck: Normal range of motion.  CVS: Normal rate, regular rhythm.  Normal heart sounds.  No murmur.  Breast: No overlying warmth or erythema induration or crepitance to bilateral breasts. No significant reproducible pain. No nipple discharge. ANJANA Luke  Pulmonary: Lungs clear to auscultation bilaterally. No wheezes/rales/rhonchi.  GI: Abdomen soft. Nontender, nondistended. No rigidity or  guarding.    MSK: No calf tenderness or swelling.  Neuro: Alert. Follows simple commands.  Skin: Skin is warm and dry. No rash noted.   Psychiatric: Normal affect.       Emergency Department Course   Laboratory:    HCG Qualitative Urine: negative    Interventions:  1914 Advil 600 mg oral     Emergency Department Course:     Nursing notes and vitals reviewed.    1910 I performed an exam of the patient as documented above.     1912 The patient had a breast exam performed here in the emergency department, which she tolerated without difficulty. This was done in the presence of a female chaperone.    1914 The patient provided a urine sample here in the emergency department. This was sent for laboratory testing, findings above.      Impression & Plan      Medical Decision Making:  Stacia Hannon is a 23 year old female who presents to the emergency department today for evaluation of breast pain. She is nontoxic on arrival. No overlying erythema, warmth or signs to suggest cellulitis or abscess on her breast exam. She denies any nipple discharge or history of breast carcinoma. The patient is not pregnant today. She is currently breastfeeding and I discussed that she may have a clogged duct. I recommended warm compresses as well as ibuprofen for pain management. I also did discuss with patient my recommendation to obtain formal breast ultra sound which unfortunately can not be performed here  today. She reports plans for close OB follow up, return to ED for fevers, worsening redness, pain to breast or should symptoms worsen or change.     Diagnosis:    ICD-10-CM    1. Breast pain N64.4 HCG qualitative urine     Disposition:   The patient is discharged to home.    Discharge Medications:   START taking      Dose / Directions   * ibuprofen 600 MG tablet  Commonly known as:  ADVIL/MOTRIN      Dose:  600 mg  Take 1 tablet (600 mg) by mouth every 6 hours as needed for moderate pain  Quantity:  20 tablet  Refills:  0       Scribe  Disclosure:  I, Radha Mccarty, am serving as a scribe at 7:04 PM on 8/7/2019 to document services personally performed by Dinora Murillo DO based on my observations and the provider's statements to me.  [facility] EMERGENCY DEPARTMENT       Dinora Murillo DO  08/08/19 0042

## 2019-12-07 ENCOUNTER — HOSPITAL ENCOUNTER (EMERGENCY)
Facility: CLINIC | Age: 24
Discharge: HOME | End: 2019-12-08
Attending: NURSE PRACTITIONER | Admitting: NURSE PRACTITIONER

## 2019-12-07 VITALS
SYSTOLIC BLOOD PRESSURE: 120 MMHG | BODY MASS INDEX: 32.53 KG/M2 | DIASTOLIC BLOOD PRESSURE: 72 MMHG | TEMPERATURE: 97.2 F | RESPIRATION RATE: 16 BRPM | OXYGEN SATURATION: 100 % | WEIGHT: 183.64 LBS

## 2019-12-07 DIAGNOSIS — R10.30 LOWER ABDOMINAL PAIN: ICD-10-CM

## 2019-12-07 LAB
ERYTHROCYTE [DISTWIDTH] IN BLOOD BY AUTOMATED COUNT: 12.3 % (ref 10–15)
HCT VFR BLD AUTO: 36.8 % (ref 35–47)
HGB BLD-MCNC: 11.7 G/DL (ref 11.7–15.7)
MCH RBC QN AUTO: 30.5 PG (ref 26.5–33)
MCHC RBC AUTO-ENTMCNC: 31.8 G/DL (ref 31.5–36.5)
MCV RBC AUTO: 96 FL (ref 78–100)
PLATELET # BLD AUTO: 285 10E9/L (ref 150–450)
RBC # BLD AUTO: 3.84 10E12/L (ref 3.8–5.2)
WBC # BLD AUTO: 8.3 10E9/L (ref 4–11)

## 2019-12-07 PROCEDURE — 85027 COMPLETE CBC AUTOMATED: CPT | Performed by: NURSE PRACTITIONER

## 2019-12-07 PROCEDURE — 80048 BASIC METABOLIC PNL TOTAL CA: CPT | Performed by: NURSE PRACTITIONER

## 2019-12-07 PROCEDURE — 81001 URINALYSIS AUTO W/SCOPE: CPT | Performed by: NURSE PRACTITIONER

## 2019-12-07 PROCEDURE — 99285 EMERGENCY DEPT VISIT HI MDM: CPT | Mod: 25

## 2019-12-07 PROCEDURE — 84702 CHORIONIC GONADOTROPIN TEST: CPT

## 2019-12-07 PROCEDURE — 96374 THER/PROPH/DIAG INJ IV PUSH: CPT | Mod: 59

## 2019-12-07 RX ORDER — KETOROLAC TROMETHAMINE 15 MG/ML
15 INJECTION, SOLUTION INTRAMUSCULAR; INTRAVENOUS ONCE
Status: COMPLETED | OUTPATIENT
Start: 2019-12-07 | End: 2019-12-08

## 2019-12-07 ASSESSMENT — ENCOUNTER SYMPTOMS
DYSURIA: 0
ABDOMINAL PAIN: 1

## 2019-12-07 NOTE — ED AVS SNAPSHOT
[facility] Emergency Department  [address]  [facility] [address]  Phone:  [phone]  Fax:  [phone]                                    Stacia Hannon   MRN: [id number]    Department:  [facility] Emergency Department   Date of Visit:  12/7/2019           After Visit Summary Signature Page    I have received my discharge instructions, and my questions have been answered. I have discussed any challenges I see with this plan with the nurse or doctor.    ..........................................................................................................................................  Patient/Patient Representative Signature      ..........................................................................................................................................  Patient Representative Print Name and Relationship to Patient    ..................................................               ................................................  Date                                   Time    ..........................................................................................................................................  Reviewed by Signature/Title    ...................................................              ..............................................  Date                                               Time          22EPIC Rev 08/18

## 2019-12-08 ENCOUNTER — APPOINTMENT (OUTPATIENT)
Dept: CT IMAGING | Facility: CLINIC | Age: 24
End: 2019-12-08
Attending: NURSE PRACTITIONER

## 2019-12-08 ENCOUNTER — APPOINTMENT (OUTPATIENT)
Dept: ULTRASOUND IMAGING | Facility: CLINIC | Age: 24
End: 2019-12-08
Attending: NURSE PRACTITIONER

## 2019-12-08 LAB
ALBUMIN UR-MCNC: 20 MG/DL
ANION GAP SERPL CALCULATED.3IONS-SCNC: 8 MMOL/L (ref 3–14)
APPEARANCE UR: ABNORMAL
B-HCG FREE SERPL-ACNC: <5 IU/L
BILIRUB UR QL STRIP: NEGATIVE
BUN SERPL-MCNC: 10 MG/DL (ref 7–30)
CALCIUM SERPL-MCNC: 8.6 MG/DL (ref 8.5–10.1)
CHLORIDE SERPL-SCNC: 107 MMOL/L (ref 94–109)
CO2 SERPL-SCNC: 25 MMOL/L (ref 20–32)
COLOR UR AUTO: YELLOW
CREAT SERPL-MCNC: 0.36 MG/DL (ref 0.52–1.04)
GFR SERPL CREATININE-BSD FRML MDRD: >90 ML/MIN/{1.73_M2}
GLUCOSE SERPL-MCNC: 96 MG/DL (ref 70–99)
GLUCOSE UR STRIP-MCNC: NEGATIVE MG/DL
HGB UR QL STRIP: NEGATIVE
KETONES UR STRIP-MCNC: ABNORMAL MG/DL
LEUKOCYTE ESTERASE UR QL STRIP: NEGATIVE
MUCOUS THREADS #/AREA URNS LPF: PRESENT /LPF
NITRATE UR QL: NEGATIVE
PH UR STRIP: 6.5 PH (ref 5–7)
POTASSIUM SERPL-SCNC: 3.5 MMOL/L (ref 3.4–5.3)
RBC #/AREA URNS AUTO: 3 /HPF (ref 0–2)
SODIUM SERPL-SCNC: 140 MMOL/L (ref 133–144)
SOURCE: ABNORMAL
SP GR UR STRIP: 1.01 (ref 1–1.03)
SQUAMOUS #/AREA URNS AUTO: 15 /HPF (ref 0–1)
UROBILINOGEN UR STRIP-MCNC: 3 MG/DL (ref 0–2)
WBC #/AREA URNS AUTO: 3 /HPF (ref 0–5)

## 2019-12-08 PROCEDURE — 25000128 H RX IP 250 OP 636: Performed by: NURSE PRACTITIONER

## 2019-12-08 PROCEDURE — 74177 CT ABD & PELVIS W/CONTRAST: CPT

## 2019-12-08 PROCEDURE — 25000125 ZZHC RX 250: Performed by: NURSE PRACTITIONER

## 2019-12-08 PROCEDURE — 76830 TRANSVAGINAL US NON-OB: CPT

## 2019-12-08 RX ORDER — IOPAMIDOL 755 MG/ML
500 INJECTION, SOLUTION INTRAVASCULAR ONCE
Status: COMPLETED | OUTPATIENT
Start: 2019-12-08 | End: 2019-12-08

## 2019-12-08 RX ADMIN — IOPAMIDOL 90 ML: 755 INJECTION, SOLUTION INTRAVENOUS at 00:29

## 2019-12-08 RX ADMIN — KETOROLAC TROMETHAMINE 15 MG: 15 INJECTION, SOLUTION INTRAMUSCULAR; INTRAVENOUS at 00:08

## 2019-12-08 RX ADMIN — SODIUM CHLORIDE 62 ML: 9 INJECTION, SOLUTION INTRAVENOUS at 00:30

## 2019-12-08 ASSESSMENT — ENCOUNTER SYMPTOMS
FEVER: 0
NAUSEA: 0
CHILLS: 0
VOMITING: 0
SHORTNESS OF BREATH: 0

## 2019-12-08 NOTE — ED PROVIDER NOTES
History     Chief Complaint:  Pelvic Pain    HPI   Stacia Hannon is a 24 year old female who presents to the emergency department today with pelvic pain. The patient has been having abdominal cramping and pelvic pain for the last 2 days. It first started on the left side, but spread to the right. She states the pain is burning and radiates to the back. The pain is constant, but gets worse with movement. She was supposed to have her period on Dec 1st, but it has not come. She denies dysuria, vaginal discharge. She denies history of kidney stones. She states she could be pregnant, but pregnancy test was negative at home.     Allergies:  No Known Drug Allergies     Medications:    Iron      Past Medical History:    Anemia     Pancreatitis       Past Surgical History:    Cholecystectomy      Family History:    History reviewed. No pertinent family history.     Social History:  The patient was alone.  Smoking Status: Never  Smokeless Tobacco: Never  Alcohol Use: No    Marital Status:       Review of Systems   Constitutional: Negative for chills and fever.   Respiratory: Negative for shortness of breath.    Cardiovascular: Negative for chest pain.   Gastrointestinal: Positive for abdominal pain. Negative for nausea and vomiting.   Genitourinary: Positive for pelvic pain. Negative for dysuria, vaginal bleeding, vaginal discharge and vaginal pain.   All other systems reviewed and are negative.      Physical Exam     Patient Vitals for the past 24 hrs:   BP Temp Temp src Heart Rate Resp SpO2 Weight   19 2256 120/72 97.2  F (36.2  C) Temporal 70 16 100 % 83.3 kg (183 lb 10.3 oz)      Physical Exam  General: Alert, No obvious discomfort, well kept, obese  Eyes: PERRL, conjunctivae pink no scleral icterus or conjunctival injection  ENT:   Moist mucus membranes, posterior oropharynx clear without erythema or exudates, No lymphadenopathy, Normal voice  Resp:  Lungs clear to auscultation bilaterally, no  crackles/rubs/wheezes. Good air movement  CV:  Normal rate and rhythm, no murmurs/rubs/gallops  GI:  Abdomen soft and non-distended.  Normoactive BS.  Mild lower abd tenderness, No guarding or rebound, No masses  Skin:  Warm, dry.  No rashes or petechiae  Musculoskeletal: No peripheral edema or calf tenderness, Normal gross ROM   Neuro: Alert and oriented to person/place/time, normal sensation  Psychiatric: Normal affect, cooperative, good eye contact    Emergency Department Course   Imaging:  Radiology findings were communicated with the patient who voiced understanding of the findings.  US Pelvic Complete with Transvaginal   Final Result   IMPRESSION: Normal pelvis.      CT Abdomen Pelvis w Contrast   Final Result   IMPRESSION:    1.  Normal appendix. No findings to account for the patient's lower abdominal tenderness.         Report per radiology      Laboratory:  Laboratory findings were communicated with the patient who voiced understanding of the findings.  BMP: AWNL (Creatinine 0.36)   CBC: AWNL (WBC 8.3, HGB 11.7, )   HC.0  UA: slightly cloudy, yellow urine with trace ketones, 20 protein albumin, 3.0 urinobilinogen, 3 RBC, 15 Squamous Epithelial, mucous present o/w WNL     Interventions:  0008: Toradol 15 mg IV     Emergency Department Course:  Nursing notes and vitals reviewed.  2315: I performed an exam of the patient as documented above.   IV was inserted and blood was drawn for laboratory testing, results above.  The patient provided a urine sample here in the emergency department. This was sent for laboratory testing, findings above.  The patient was sent for a US Pelvic Complete w Transvaginal and CT Abdomen Pelvis while in the emergency department, results above.    0112: Findings and plan explained to the Patient. Patient discharged home with instructions regarding supportive care, medications, and reasons to return. The importance of close follow-up was reviewed.   I personally reviewed  the laboratory and imaging results with the Patient and answered all related questions prior to discharge.      Impression & Plan      Medical Decision Making:  Stacia Hannon presents with abdominal pain.  The differential diagnosis is broad and includes:  Appendicitis, cholecystitis, peptic ulcer disease, diverticulitis, bowel obstruction, ischemia, pancreatitis, amongst others.  Not pregnant. No torsion.  Based on clinical exam, laboratory testing, and imaging, no significant etiologies were found.  The pain has improved with interventions in the ED.  The exact etiology of the pain is not clear at this time.  She will be discharged, and was warned that persistent or worsening symptoms should prompt re-examination (ED if necessary) in 8-12 hours.    Diagnosis:    ICD-10-CM    1. Lower abdominal pain R10.30        Disposition:  discharged to home     Scribe Disclosure:  I, Marya Sousa MD, am serving as a scribe at 11:19 PM on 12/7/2019 to document services personally performed by Jose Moreno APRN based on my observations and the provider's statements to me.    12/7/2019   [facility] EMERGENCY DEPARTMENT       Jose Moreno APRN CNP  12/08/19 0136

## 2019-12-08 NOTE — ED TRIAGE NOTES
"Pt states \"I'm having really bad cramping in my ovaries.\"  Pt reports the pain initially started two days ago only on the left side.  Has now spread and includes the right, also reports a burning sensation.  No pain with urination.  No vaginal discharge.   "

## 2020-10-29 ENCOUNTER — VIRTUAL VISIT (OUTPATIENT)
Dept: FAMILY MEDICINE | Facility: OTHER | Age: 25
End: 2020-10-29

## 2020-10-29 ENCOUNTER — HOSPITAL ENCOUNTER (EMERGENCY)
Facility: CLINIC | Age: 25
Discharge: HOME | End: 2020-10-29
Attending: NURSE PRACTITIONER | Admitting: NURSE PRACTITIONER
Payer: OTHER GOVERNMENT

## 2020-10-29 VITALS
OXYGEN SATURATION: 99 % | TEMPERATURE: 98.6 F | SYSTOLIC BLOOD PRESSURE: 115 MMHG | HEART RATE: 76 BPM | RESPIRATION RATE: 16 BRPM | DIASTOLIC BLOOD PRESSURE: 80 MMHG

## 2020-10-29 DIAGNOSIS — R52 BODY ACHES: ICD-10-CM

## 2020-10-29 DIAGNOSIS — R50.9 FEVER: ICD-10-CM

## 2020-10-29 DIAGNOSIS — R05.9 COUGH: ICD-10-CM

## 2020-10-29 DIAGNOSIS — R51.9 HEADACHE: ICD-10-CM

## 2020-10-29 PROCEDURE — C9803 HOPD COVID-19 SPEC COLLECT: HCPCS

## 2020-10-29 PROCEDURE — 250N000013 HC RX MED GY IP 250 OP 250 PS 637: Performed by: NURSE PRACTITIONER

## 2020-10-29 PROCEDURE — 99283 EMERGENCY DEPT VISIT LOW MDM: CPT

## 2020-10-29 PROCEDURE — U0003 INFECTIOUS AGENT DETECTION BY NUCLEIC ACID (DNA OR RNA); SEVERE ACUTE RESPIRATORY SYNDROME CORONAVIRUS 2 (SARS-COV-2) (CORONAVIRUS DISEASE [COVID-19]), AMPLIFIED PROBE TECHNIQUE, MAKING USE OF HIGH THROUGHPUT TECHNOLOGIES AS DESCRIBED BY CMS-2020-01-R: HCPCS | Performed by: NURSE PRACTITIONER

## 2020-10-29 RX ORDER — IBUPROFEN 600 MG/1
600 TABLET, FILM COATED ORAL ONCE
Status: COMPLETED | OUTPATIENT
Start: 2020-10-29 | End: 2020-10-29

## 2020-10-29 RX ADMIN — IBUPROFEN 600 MG: 600 TABLET, FILM COATED ORAL at 21:53

## 2020-10-29 ASSESSMENT — ENCOUNTER SYMPTOMS
ABDOMINAL PAIN: 0
COUGH: 1
DIARRHEA: 0
VOMITING: 0
BACK PAIN: 1
HEADACHES: 1
FEVER: 1
NAUSEA: 0
DYSURIA: 0
SHORTNESS OF BREATH: 1

## 2020-10-29 NOTE — ED AVS SNAPSHOT
[facility] Emergency Dept  [address]  [facility] [address]  Phone: [phone]  Fax: [phone]                                    Stacia Hannon   MRN: [id number]    Department: [facility] Emergency Dept   Date of Visit: 10/29/2020           After Visit Summary Signature Page    I have received my discharge instructions, and my questions have been answered. I have discussed any challenges I see with this plan with the nurse or doctor.    ..........................................................................................................................................  Patient/Patient Representative Signature      ..........................................................................................................................................  Patient Representative Print Name and Relationship to Patient    ..................................................               ................................................  Date                                   Time    ..........................................................................................................................................  Reviewed by Signature/Title    ...................................................              ..............................................  Date                                               Time          22EPIC Rev 08/18

## 2020-10-29 NOTE — PROGRESS NOTES
"Date: 10/29/2020 15:46:27  Clinician: Noemi Guerrero  Clinician NPI: [id number]  Patient: Stacia Hannon  Patient : 1995  Patient Address: [address] [place], [place] [address] [place] [address]  Patient Phone: [phone]  Visit Protocol: URI  Patient Summary:  Stacia is a 25 year old ( : 1995 ) female who initiated a [facility] Visit for COVID-19 (Coronavirus) evaluation and screening. When asked the question \"Please sign me up to receive news, health information and promotions. \", Stacia responded \"Yes\".    Stacia states her symptoms started 1-2 days ago.   Her symptoms consist of a headache, a cough, myalgia, chills, and malaise. She is experiencing mild difficulty breathing with activities but can speak normally in full sentences. Stacia also feels feverish.   Symptom details     Cough: Stacia coughs a few times an hour and her cough is not more bothersome at night. Phlegm comes into her throat when she coughs. She believes her cough is caused by post-nasal drip. The color of the phlegm is yellow.     Temperature: Her current temperature is 100.3 degrees Fahrenheit. Stacia has had a temperature over 100 degrees Fahrenheit for 1-2 days.     Headache: She states the headache is moderate (4-6 on a 10 point pain scale).      Stacia denies having ear pain, wheezing, nasal congestion, nausea, facial pain or pressure, sore throat, teeth pain, ageusia, diarrhea, anosmia, vomiting, and rhinitis. She also denies having recent facial or sinus surgery in the past 60 days, having a sinus infection within the past year, and taking antibiotic medication in the past month.   Precipitating events  She has not recently been exposed to someone with influenza. Stacia has been in close contact with the following high risk individuals: children under the age of 5.   Pertinent COVID-19 (Coronavirus) information  Stacia does not work or volunteer as healthcare worker or a . In the past 14 days, Stacia " has not worked or volunteered at a healthcare facility or group living setting.   In the past 14 days, she also has not lived in a congregate living setting.   Stacia has not had a close contact with a laboratory-confirmed COVID-19 patient within 14 days of symptom onset.    Since December 2019, Stacia has not been diagnosed with lab-confirmed COVID-19 test and has had upper respiratory infection (URI) or influenza-like illness.      Date(s) of previous URI or influenza-like illness (free-text): 12/10/2019     Symptoms Stacia experienced during previous URI or influenza-like illness as reported by the patient (free-text): Chills sore throat headache feeling down        Pertinent medical history  Stacia does not get yeast infections when she takes antibiotics.   Stacia does not need a return to work/school note.   Weight: 186 lbs   Stacia does not smoke or use smokeless tobacco.   She denies pregnancy and denies breastfeeding. She is currently menstruating.   Weight: 186 lbs    MEDICATIONS: No current medications, ALLERGIES: NKDA  Clinician Response:  Dear Stacia,   Your symptoms show that you may have coronavirus (COVID-19). This illness can cause fever, cough and trouble breathing. Many people get a mild case and get better on their own. Some people can get very sick.  Based on the symptoms you have shared, I would like you to be re-checked in 2 to 3 days. Please call your family clinic to set up a video or phone visit.  Will I be tested for COVID-19?  We would like to test you for this virus.   Please call [phone] to schedule your visit. Explain that you were referred by [facility] to have a COVID-19 test. Be ready to share your [facility] visit ID number.    The following will serve as your written order for this COVID Test, ordered by me, for the indication of suspected COVID [Z20.828]: The test will be ordered in [facility], our electronic health record, after you are scheduled. It will show as ordered and  "authorized by Abelardo Manzano MD.  Order: COVID-19 (Coronavirus) PCR for SYMPTOMATIC testing from [facility].   1.When it's time for your COVID test:   Stay at least 6 feet away from others. (If someone will drive you to your test, stay in the backseat, as far away from the  as you can.)   Cover your mouth and nose with a mask, tissue or washcloth.  Go straight to the testing site. Don't make any stops on the way there or back.      2.Starting now: Stay home and away from others (self-isolate) until:   You've had no fever---and no medicine that reduces fever---for one full day (24 hours). And...   Your other symptoms have gotten better. For example, your cough or breathing has improved. And...   At least 10 days have passed since your symptoms started.       During this time, don't leave the house except for testing or medical care.   Stay in your own room, even for meals. Use your own bathroom if you can.   Stay away from others in your home. No hugging, kissing or shaking hands. No visitors.  Don't go to work, school or anywhere else.    Clean \"high touch\" surfaces often (doorknobs, counters, handles, etc.). Use a household cleaning spray or wipes. You'll find a full list of  on the EPA website: www.epa.gov/pesticide-registration/list-n-disinfectants-use-against-sars-cov-2.   Cover your mouth and nose with a mask, tissue or washcloth to avoid spreading germs.  Wash your hands and face often. Use soap and water.  Caregivers in these groups are at risk for severe illness due to COVID-19:  o People 65 years and older  o People who live in a nursing home or long-term care facility  o People with chronic disease (lung, heart, cancer, diabetes, kidney, liver, immunologic)   o People who have a weakened immune system, including those who:   Are in cancer treatment  Take medicine that weakens the immune system, such as corticosteroids  Had a bone marrow or organ transplant  Have an immune deficiency  Have poorly " controlled HIV or AIDS  Are obese (body mass index of 40 or higher)  Smoke regularly   o Caregivers should wear gloves while washing dishes, handling laundry and cleaning bedrooms and bathrooms.  o Use caution when washing and drying laundry: Don't shake dirty laundry, and use the warmest water setting that you can.  o For more tips, go to www.cdc.gov/coronavirus/2019-ncov/downloads/10Things.pdf.      How can I take care of myself?   Get lots of rest. Drink extra fluids (unless a doctor has told you not to)   Take Tylenol (acetaminophen) for fever or pain. If you have liver or kidney problems, ask your family doctor if it's okay to take Tylenol.   Adults can take either:    650 mg (two 325 mg pills) every 4 to 6 hours, or...   1,000 mg (two 500 mg pills) every 8 hours as needed.    Note: Don't take more than 3,000 mg in one day. Acetaminophen is found in many medicines (both prescribed and over-the-counter medicines). Read all labels to be sure you don't take too much.   For children, check the Tylenol bottle for the right dose. The dose is based on the child's age or weight.    If you have other health problems (like cancer, heart failure, an organ transplant or severe kidney disease): Call your specialty clinic if you don't feel better in the next 2 days.       Know when to call 911. Emergency warning signs include:    Trouble breathing or shortness of breath Pain or pressure in the chest that doesn't go away Feeling confused like you haven't felt before, or not being able to wake up Bluish-colored lips or face  Where can I get more information?    [facility] -- About COVID-19: [email or web address]   CDC -- What to Do If You're Sick: www.cdc.gov/coronavirus/2019-ncov/about/steps-when-sick.html   CDC -- Ending Home Isolation: www.cdc.gov/coronavirus/2019-ncov/hcp/disposition-in-home-patients.html   CDC -- Caring for Someone: www.cdc.gov/coronavirus/2019-ncov/if-you-are-sick/care-for-someone.html    [facility] -- Interim Guidance for Hospital Discharge to Home: www.health.[facility].mn.us/diseases/coronavirus/hcp/hospdischarge.pdf   [facility] clinical trials (COVID-19 research studies): clinicalaffairs.[facility]/n-clinical-trials    Below are the COVID-19 hotlines at the [place] Department of Health ([facility]). Interpreters are available.    For health questions: Call 058-038-8810 or 1-917.239.7053 (7 a.m. to 7 p.m.) For questions about schools and childcare: Call 872-829-6517 or 1-881.907.9669 (7 a.m. to 7 p.m.)       Diagnosis: Contact with and (suspected) exposure to other viral communicable diseases  Diagnosis ICD: Z20.828

## 2020-10-30 NOTE — ED PROVIDER NOTES
History     Chief Complaint:  Fever and Cough    HPI   Stacia Hannon is a 25 year old female who presents with a fever and cough. Two days ago the patient had an onset of cough, fever, and shortness of breath with deep breathing. She has had a fever at night of 101 and has been taking Tylenol, last took 500 mg at 1800, and states the Tylenol does not make her fever go away. She also notes she has a persistent headache and persistent back pain which started yesterday. There is no history of asthma or blood clots. She has not had known exposure to someone with COVID-19. For work she stays at home with her kids. Her most recent menstrual period ended yesterday.    Allergies:  No Known Drug Allergies      Medications:    The patient is not currently taking any prescribed medications.     Past Medical History:    Iron deficiency anemia  Pancreatitis    Past Surgical History:    Cholecystectomy    Family History:    Diabetes   Heart diease, mother  High cholesterol  Stroke    Social History:  Smoking status: No  Alcohol use: No  Drug use: No  Patient presents alone.  PCP: [facility], [place]    Marital Status:        Review of Systems   Constitutional: Positive for fever.   Respiratory: Positive for cough and shortness of breath.    Cardiovascular: Negative for chest pain.   Gastrointestinal: Negative for abdominal pain, diarrhea, nausea and vomiting.   Genitourinary: Negative for dysuria.   Musculoskeletal: Positive for back pain.   Neurological: Positive for headaches.   All other systems reviewed and are negative.    Physical Exam     Patient Vitals for the past 24 hrs:   BP Temp Temp src Pulse Resp SpO2   10/29/20 2200 -- -- -- -- -- 99 %   10/29/20 2111 115/80 98.6  F (37  C) Temporal 76 16 --      Physical Exam  Constitutional: Patient appears comfortable, not dyspneic, not coughing.    HENT: Voice normal, handling own secretions    Respiratory:  No respiratory distress, able to speak in full  sentences.  Respiratory rate appears normal.    Neurologic: Alert and interacting normally.  Oriented, no obvious focal weakness, answers questions appropriately    Psych: Mentation is normal, thought processes normal. Normal Affect, good eye contact    Emergency Department Course     Laboratory:  Symptomatic COVID-19 Virus by PCR: In process     Interventions:  2153 Ibuprofen 600 mg tablet     Emergency Department Course:  2123 Nursing notes and vitals reviewed. I performed an exam of the patient as documented above.     Medicine administered as documented above.    Findings and plan explained to the Patient. Patient discharged home with instructions regarding supportive care, medications, and reasons to return. The importance of close follow-up was reviewed.     Impression & Plan    Covid-19  Stacia Hannon was evaluated during a global COVID-19 pandemic, which necessitated consideration that the patient might be at risk for infection with the SARS-CoV-2 virus that causes COVID-19.   Applicable protocols for evaluation were followed during the patient's care.   COVID-19 was considered as part of the patient's evaluation. The plan for testing is:  a test was obtained during this visit.     Medical Decision Making:  Stacia Hannon is a 25 year old femalewho presented for evaluation of viral illness type symptoms. she does not have a known COVID exposure.   COVID swab is pending.  Appropriate quarantine measures discussed.  Symptomatic treatment discussed.  Reasons for return to the emergency department were discussed.  There was no indication for further testing or imagery.  Patient appropriate for outpatient management and symptomatic treatment.  Discharged to home.    Diagnosis:    ICD-10-CM    1. Cough  R05 Symptomatic COVID-19 Virus (Coronavirus) by PCR   2. Fever  R50.9    3. Headache  R51.9    4. Body aches  R52        Disposition:  discharged to home    Discharge Medications:  New Prescriptions    No  medications on file       Akiko Blancas  10/29/2020   [facility] EMERGENCY DEPT    Scribe Disclosure:  I, Akiko Blancas, am serving as a scribe at 9:23 PM on 10/29/2020 to document services personally performed by Jose Moreno APRN based on my observations and the provider's statements to me.         Jose Moreno APRN CNP  10/29/20 1739

## 2020-10-30 NOTE — DISCHARGE INSTRUCTIONS
"You may continue to use Tylenol and/or ibuprofen to help with your symptoms.  Again you may take up to 1000 mg of each dose of Tylenol and 600 mg of ibuprofen.  You can stagger these medications every 3 hours so that you are taking each medication 6 hours from a previous dose.  Also okay to use over-the-counter cough and cold medicine.  Just remember that these often contain Tylenol and/or ibuprofen.  Do not exceed recommended daily doses.    Discharge Instructions for COVID-19 Patients  You have--or may have--COVID-19. Please follow the instructions listed below.   If you have a weakened immune system, discuss with your doctor any other actions you need to take.  How can I protect others?  If you have symptoms (fever, cough, body aches or trouble breathing):  Stay home and away from others (self-isolate) until:  At least 10 days have passed since your symptoms started, And   You've had no fever--and no medicine that reduces fever--for 1 full day (24 hours), And    Your other symptoms have resolved (gotten better).  If you don't show symptoms, but testing showed that you have COVID-19:  Stay home and away from others (self-isolate). Follow the tips under \"How do I self-isolate?\" below for 10 days (20 days if you have a weak immune system).  You don't need to be retested for COVID-19 before going back to school or work. As long as you're fever-free and feeling better, you can go back to school, work and other activities after waiting the 10 or 20 days.   How do I self-isolate?  Stay in your own room, even for meals. Use your own bathroom if you can.  Stay away from others in your home. No hugging, kissing or shaking hands. No visitors.  Don't go to work, school or anywhere else.  Clean \"high touch\" surfaces often (doorknobs, counters, handles). Use household cleaning spray or wipes. You'll find a full list of  on the EPA website: " www.epa.gov/pesticide-registration/list-n-disinfectants-use-against-sars-cov-2.  Cover your mouth and nose with a mask or other face covering to avoid spreading germs.  Wash your hands and face often. Use soap and water.  Caregivers in these groups are at risk for severe illness due to COVID-19:  People 65 years and older  People who live in a nursing home or long-term care facility  People with chronic disease (lung, heart, cancer, diabetes, kidney, liver, immunologic)  People who have a weakened immune system, including those who:  Are in cancer treatment  Take medicine that weakens the immune system, such as corticosteroids  Had a bone marrow or organ transplant  Have an immune deficiency  Have poorly controlled HIV or AIDS  Are obese (body mass index of 40 or higher)  Smoke regularly  Caregivers should wear gloves while washing dishes, handling laundry and cleaning bedrooms and bathrooms.  Use caution when washing and drying laundry: Don't shake dirty laundry and use the warmest water setting that you can.  For more tips on managing your health at home, go to www.cdc.gov/coronavirus/2019-ncov/downloads/10Things.pdf.  How can I take care of myself at home?  Get lots of rest. Drink extra fluids (unless a doctor has told you not to).    Take Tylenol (acetaminophen) for fever or pain. If you have liver or kidney problems, ask your family doctor if it's okay to take Tylenol.     Adults can take either:  650 mg (two 325 mg pills) every 4 to 6 hours, or   1,000 mg (two 500 mg pills) every 8 hours as needed.  Note: Don't take more than 3,000 mg in one day. Acetaminophen is found in many medicines (both prescribed and over-the-counter medicines). Read all labels to be sure you don't take too much.   For children, check the Tylenol bottle for the right dose. The dose is based on the child's age or weight.  If you have other health problems (like cancer, heart failure, an organ transplant or severe kidney disease): Call  your specialty clinic if you don't feel better in the next 2 days.    Know when to call 911. Emergency warning signs include:  Trouble breathing or shortness of breath  Pain or pressure in the chest that doesn't go away  Feeling confused like you haven't felt before, or not being able to wake up  Bluish-colored lips or face    Your doctor may have prescribed a blood thinner medicine. Follow their instructions.  Where can I get more information?  [facility] - About COVID-19: [email or web address]  CDC - What to Do If You're Sick: www.cdc.gov/coronavirus/2019-ncov/about/steps-when-sick.html  CDC - Ending Home Isolation: www.cdc.gov/coronavirus/2019-ncov/hcp/disposition-in-home-patients.html  CDC - Caring for Someone: www.cdc.gov/coronavirus/2019-ncov/if-you-are-sick/care-for-someone.html  [facility] - Interim Guidance for Hospital Discharge to Home: www.[facility].[place]/diseases/coronavirus/hcp/hospdischarge.pdf  [facility] clinical trials (COVID-19 research studies): clinicalaffairs.[facility]-clinical-trials  Below are the COVID-19 hotlines at the [place] Department of Health ([facility]). Interpreters are available.  For health questions: Call 984-138-9481 or 1-671.138.8474 (7 a.m. to 7 p.m.)  For questions about schools and childcare: Call 264-703-3124 or 1-543.830.7598 (7 a.m. to 7 p.m.)    For informational purposes only. Not to replace the advice of your health care provider. Clinically reviewed by the Infection Prevention Team. Copyright   2020 [employer]. All rights reserved. [employer] 833838 - REV 08/04/20.

## 2020-10-30 NOTE — ED TRIAGE NOTES
Pt in with C/O cough, fever and SOB. Pt reports onset of sx 2 days ago. Pt A&Ox4 ABCD's intact. Last dose of tylenol 1800

## 2020-10-31 ENCOUNTER — NURSE TRIAGE (OUTPATIENT)
Dept: NURSING | Facility: CLINIC | Age: 25
End: 2020-10-31

## 2020-10-31 ENCOUNTER — HOSPITAL ENCOUNTER (EMERGENCY)
Facility: CLINIC | Age: 25
Discharge: HOME | End: 2020-11-01
Attending: EMERGENCY MEDICINE | Admitting: EMERGENCY MEDICINE
Payer: OTHER GOVERNMENT

## 2020-10-31 DIAGNOSIS — R10.13 ABDOMINAL PAIN, EPIGASTRIC: ICD-10-CM

## 2020-10-31 DIAGNOSIS — R07.9 CHEST PAIN, UNSPECIFIED TYPE: ICD-10-CM

## 2020-10-31 DIAGNOSIS — U07.1 2019 NOVEL CORONAVIRUS DISEASE (COVID-19): ICD-10-CM

## 2020-10-31 LAB
SARS-COV-2 RNA SPEC QL NAA+PROBE: ABNORMAL
SPECIMEN SOURCE: ABNORMAL

## 2020-10-31 PROCEDURE — 99285 EMERGENCY DEPT VISIT HI MDM: CPT | Mod: 25

## 2020-10-31 PROCEDURE — 93005 ELECTROCARDIOGRAM TRACING: CPT

## 2020-10-31 NOTE — ED AVS SNAPSHOT
[facility] Emergency Dept  [address]  [facility] [address]  Phone: [phone]  Fax: [phone]                                    Stacia Hannon   MRN: [id number]    Department: [facility] Emergency Dept   Date of Visit: 10/31/2020           After Visit Summary Signature Page    I have received my discharge instructions, and my questions have been answered. I have discussed any challenges I see with this plan with the nurse or doctor.    ..........................................................................................................................................  Patient/Patient Representative Signature      ..........................................................................................................................................  Patient Representative Print Name and Relationship to Patient    ..................................................               ................................................  Date                                   Time    ..........................................................................................................................................  Reviewed by Signature/Title    ...................................................              ..............................................  Date                                               Time          22EPIC Rev 08/18

## 2020-11-01 ENCOUNTER — APPOINTMENT (OUTPATIENT)
Dept: GENERAL RADIOLOGY | Facility: CLINIC | Age: 25
End: 2020-11-01
Attending: EMERGENCY MEDICINE
Payer: OTHER GOVERNMENT

## 2020-11-01 ENCOUNTER — TELEPHONE (OUTPATIENT)
Dept: LAB | Facility: CLINIC | Age: 25
End: 2020-11-01

## 2020-11-01 ENCOUNTER — APPOINTMENT (OUTPATIENT)
Dept: CT IMAGING | Facility: CLINIC | Age: 25
End: 2020-11-01
Attending: EMERGENCY MEDICINE
Payer: OTHER GOVERNMENT

## 2020-11-01 VITALS
DIASTOLIC BLOOD PRESSURE: 60 MMHG | RESPIRATION RATE: 17 BRPM | OXYGEN SATURATION: 96 % | SYSTOLIC BLOOD PRESSURE: 97 MMHG | TEMPERATURE: 97.8 F | HEART RATE: 78 BPM

## 2020-11-01 LAB
ALBUMIN SERPL-MCNC: 3.5 G/DL (ref 3.4–5)
ALP SERPL-CCNC: 63 U/L (ref 40–150)
ALT SERPL W P-5'-P-CCNC: 29 U/L (ref 0–50)
ANION GAP SERPL CALCULATED.3IONS-SCNC: 7 MMOL/L (ref 3–14)
AST SERPL W P-5'-P-CCNC: 18 U/L (ref 0–45)
BASOPHILS # BLD AUTO: 0 10E9/L (ref 0–0.2)
BASOPHILS NFR BLD AUTO: 0.2 %
BILIRUB SERPL-MCNC: 0.2 MG/DL (ref 0.2–1.3)
BUN SERPL-MCNC: 6 MG/DL (ref 7–30)
CALCIUM SERPL-MCNC: 8.1 MG/DL (ref 8.5–10.1)
CHLORIDE SERPL-SCNC: 103 MMOL/L (ref 94–109)
CO2 SERPL-SCNC: 25 MMOL/L (ref 20–32)
CREAT SERPL-MCNC: 0.62 MG/DL (ref 0.52–1.04)
D DIMER PPP FEU-MCNC: 0.6 UG/ML FEU (ref 0–0.5)
DIFFERENTIAL METHOD BLD: NORMAL
EOSINOPHIL # BLD AUTO: 0 10E9/L (ref 0–0.7)
EOSINOPHIL NFR BLD AUTO: 0 %
ERYTHROCYTE [DISTWIDTH] IN BLOOD BY AUTOMATED COUNT: 12.3 % (ref 10–15)
GFR SERPL CREATININE-BSD FRML MDRD: >90 ML/MIN/{1.73_M2}
GLUCOSE SERPL-MCNC: 101 MG/DL (ref 70–99)
HCG SERPL QL: NEGATIVE
HCT VFR BLD AUTO: 41.5 % (ref 35–47)
HGB BLD-MCNC: 13.2 G/DL (ref 11.7–15.7)
IMM GRANULOCYTES # BLD: 0 10E9/L (ref 0–0.4)
IMM GRANULOCYTES NFR BLD: 0.2 %
INTERPRETATION ECG - MUSE: NORMAL
LIPASE SERPL-CCNC: 67 U/L (ref 73–393)
LYMPHOCYTES # BLD AUTO: 0.9 10E9/L (ref 0.8–5.3)
LYMPHOCYTES NFR BLD AUTO: 20.2 %
MCH RBC QN AUTO: 30.5 PG (ref 26.5–33)
MCHC RBC AUTO-ENTMCNC: 31.8 G/DL (ref 31.5–36.5)
MCV RBC AUTO: 96 FL (ref 78–100)
MONOCYTES # BLD AUTO: 0.3 10E9/L (ref 0–1.3)
MONOCYTES NFR BLD AUTO: 6.1 %
NEUTROPHILS # BLD AUTO: 3.3 10E9/L (ref 1.6–8.3)
NEUTROPHILS NFR BLD AUTO: 73.3 %
NRBC # BLD AUTO: 0 10*3/UL
NRBC BLD AUTO-RTO: 0 /100
PLATELET # BLD AUTO: 251 10E9/L (ref 150–450)
POTASSIUM SERPL-SCNC: 3.3 MMOL/L (ref 3.4–5.3)
PROT SERPL-MCNC: 8.4 G/DL (ref 6.8–8.8)
RBC # BLD AUTO: 4.33 10E12/L (ref 3.8–5.2)
SODIUM SERPL-SCNC: 135 MMOL/L (ref 133–144)
TROPONIN I SERPL-MCNC: <0.015 UG/L (ref 0–0.04)
WBC # BLD AUTO: 4.5 10E9/L (ref 4–11)

## 2020-11-01 PROCEDURE — 85379 FIBRIN DEGRADATION QUANT: CPT | Performed by: EMERGENCY MEDICINE

## 2020-11-01 PROCEDURE — 250N000009 HC RX 250: Performed by: EMERGENCY MEDICINE

## 2020-11-01 PROCEDURE — 96374 THER/PROPH/DIAG INJ IV PUSH: CPT | Mod: 59

## 2020-11-01 PROCEDURE — 80053 COMPREHEN METABOLIC PANEL: CPT | Performed by: EMERGENCY MEDICINE

## 2020-11-01 PROCEDURE — 83690 ASSAY OF LIPASE: CPT | Performed by: EMERGENCY MEDICINE

## 2020-11-01 PROCEDURE — 71045 X-RAY EXAM CHEST 1 VIEW: CPT

## 2020-11-01 PROCEDURE — 85025 COMPLETE CBC W/AUTO DIFF WBC: CPT | Performed by: EMERGENCY MEDICINE

## 2020-11-01 PROCEDURE — 96375 TX/PRO/DX INJ NEW DRUG ADDON: CPT | Mod: 59

## 2020-11-01 PROCEDURE — 71275 CT ANGIOGRAPHY CHEST: CPT

## 2020-11-01 PROCEDURE — 250N000013 HC RX MED GY IP 250 OP 250 PS 637: Performed by: EMERGENCY MEDICINE

## 2020-11-01 PROCEDURE — 258N000003 HC RX IP 258 OP 636: Performed by: EMERGENCY MEDICINE

## 2020-11-01 PROCEDURE — 96361 HYDRATE IV INFUSION ADD-ON: CPT

## 2020-11-01 PROCEDURE — 84703 CHORIONIC GONADOTROPIN ASSAY: CPT | Performed by: EMERGENCY MEDICINE

## 2020-11-01 PROCEDURE — 84484 ASSAY OF TROPONIN QUANT: CPT | Performed by: EMERGENCY MEDICINE

## 2020-11-01 PROCEDURE — 250N000011 HC RX IP 250 OP 636: Performed by: EMERGENCY MEDICINE

## 2020-11-01 RX ORDER — ONDANSETRON 2 MG/ML
4 INJECTION INTRAMUSCULAR; INTRAVENOUS ONCE
Status: COMPLETED | OUTPATIENT
Start: 2020-11-01 | End: 2020-11-01

## 2020-11-01 RX ORDER — LORAZEPAM 2 MG/ML
0.5 INJECTION INTRAMUSCULAR ONCE
Status: COMPLETED | OUTPATIENT
Start: 2020-11-01 | End: 2020-11-01

## 2020-11-01 RX ORDER — HYDROMORPHONE HYDROCHLORIDE 1 MG/ML
0.5 INJECTION, SOLUTION INTRAMUSCULAR; INTRAVENOUS; SUBCUTANEOUS ONCE
Status: COMPLETED | OUTPATIENT
Start: 2020-11-01 | End: 2020-11-01

## 2020-11-01 RX ORDER — SODIUM CHLORIDE 9 MG/ML
INJECTION, SOLUTION INTRAVENOUS CONTINUOUS
Status: DISCONTINUED | OUTPATIENT
Start: 2020-11-01 | End: 2020-11-01 | Stop reason: HOSPADM

## 2020-11-01 RX ORDER — MORPHINE SULFATE 2 MG/ML
2 INJECTION, SOLUTION INTRAMUSCULAR; INTRAVENOUS ONCE
Status: COMPLETED | OUTPATIENT
Start: 2020-11-01 | End: 2020-11-01

## 2020-11-01 RX ORDER — IOPAMIDOL 755 MG/ML
500 INJECTION, SOLUTION INTRAVASCULAR ONCE
Status: COMPLETED | OUTPATIENT
Start: 2020-11-01 | End: 2020-11-01

## 2020-11-01 RX ORDER — HYDROCODONE BITARTRATE AND ACETAMINOPHEN 5; 325 MG/1; MG/1
1 TABLET ORAL EVERY 6 HOURS PRN
Qty: 10 TABLET | Refills: 0 | Status: SHIPPED | OUTPATIENT
Start: 2020-11-01 | End: 2020-11-04

## 2020-11-01 RX ORDER — KETOROLAC TROMETHAMINE 15 MG/ML
15 INJECTION, SOLUTION INTRAMUSCULAR; INTRAVENOUS ONCE
Status: COMPLETED | OUTPATIENT
Start: 2020-11-01 | End: 2020-11-01

## 2020-11-01 RX ADMIN — SODIUM CHLORIDE 1000 ML: 9 INJECTION, SOLUTION INTRAVENOUS at 00:18

## 2020-11-01 RX ADMIN — SODIUM CHLORIDE 1000 ML: 9 INJECTION, SOLUTION INTRAVENOUS at 01:33

## 2020-11-01 RX ADMIN — MORPHINE SULFATE 2 MG: 2 INJECTION, SOLUTION INTRAMUSCULAR; INTRAVENOUS at 01:33

## 2020-11-01 RX ADMIN — LORAZEPAM 0.5 MG: 2 INJECTION INTRAMUSCULAR; INTRAVENOUS at 00:56

## 2020-11-01 RX ADMIN — HYDROMORPHONE HYDROCHLORIDE 0.5 MG: 1 INJECTION, SOLUTION INTRAMUSCULAR; INTRAVENOUS; SUBCUTANEOUS at 01:20

## 2020-11-01 RX ADMIN — IOPAMIDOL 63 ML: 755 INJECTION, SOLUTION INTRAVENOUS at 03:19

## 2020-11-01 RX ADMIN — KETOROLAC TROMETHAMINE 15 MG: 15 INJECTION, SOLUTION INTRAMUSCULAR; INTRAVENOUS at 00:57

## 2020-11-01 RX ADMIN — SODIUM CHLORIDE 83 ML: 9 INJECTION, SOLUTION INTRAVENOUS at 03:20

## 2020-11-01 RX ADMIN — ONDANSETRON 4 MG: 2 INJECTION INTRAMUSCULAR; INTRAVENOUS at 00:23

## 2020-11-01 RX ADMIN — LIDOCAINE HYDROCHLORIDE 30 ML: 20 SOLUTION ORAL; TOPICAL at 00:22

## 2020-11-01 ASSESSMENT — ENCOUNTER SYMPTOMS
SHORTNESS OF BREATH: 1
COUGH: 1
VOMITING: 0
ABDOMINAL PAIN: 1
MYALGIAS: 1
DIARRHEA: 0

## 2020-11-01 NOTE — ED PROVIDER NOTES
History     Chief Complaint:  Flu Symptoms       HPI  Stacia Hannon is a 25 year old year old female with a history of COVID-19 who presents for evaluation of shortness of breath and upper abdominal pain.    Patient is a 25-year-old female who was seen here 2 days ago.  At that of her arrival she had body aches and a cough and was found to be Covid positive.  Patient returns this evening stating over the last couple days she has had worsening what seems to be upper abdominal pain.  She is felt ongoingly short of breath.  The upper abdominal pain radiates into the chest.  She has had no vomiting or diarrhea no recent surgery no ill immobilization no history of prior blood clots. Patient is taking ibuprofen Tylenol without relief and presents the emergency room for further assessment.        Allergies:  No Known Drug Allergies      Medications:   Calcium-Vitamin D  Ferrous sulfate      Medical History:   Anemia  Pancreatitis      Surgical History:   Laparoscopic cholecystectomy with cholangiograms      Family History:   Family history reviewed. No pertinent family history.      Social History:  Smoking Status: Negative   Smokeless Tobacco: Negative   Alcohol Use: Negative   Drug Use: Negative   Primary Physician: [facility], [place]         Review of Systems   Respiratory: Positive for cough and shortness of breath.    Gastrointestinal: Positive for abdominal pain. Negative for diarrhea and vomiting.   Musculoskeletal: Positive for myalgias.   All other systems reviewed and are negative.      Physical Exam     Patient Vitals for the past 24 hrs:   BP Temp Temp src Pulse Resp SpO2   11/01/20 0248 -- -- -- 85 16 92 %   11/01/20 0238 -- -- -- 86 16 92 %   11/01/20 0145 96/58 -- -- 98 24 94 %   11/01/20 0130 108/65 -- -- 103 21 95 %   11/01/20 0115 107/69 -- -- 98 -- 95 %   11/01/20 0100 106/64 -- -- 99 -- 98 %   11/01/20 0045 96/65 -- -- 94 -- 96 %   11/01/20 0030 108/71 -- -- 92 -- 98 %   10/31/20 2311 129/81  97.8  F (36.6  C) Oral 119 20 99 %          Physical Exam  Constitutional:       Appearance: Normal appearance.   HENT:      Head: Normocephalic.   Eyes:      General: No scleral icterus.  Cardiovascular:      Rate and Rhythm: Normal rate and regular rhythm.   Pulmonary:      Effort: Pulmonary effort is normal.      Breath sounds: Normal breath sounds.   Abdominal:      Tenderness: There is abdominal tenderness.      Comments: Tender with palpation in the epigastrium no guarding or rebound   Skin:     General: Skin is warm.      Capillary Refill: Capillary refill takes less than 2 seconds.   Neurological:      General: No focal deficit present.      Mental Status: She is alert.   Psychiatric:      Comments: Anxious at times tearful           Emergency Department Course     ECG:  ECG taken at 0017  Sinus rhythm with fusion complexes  Otherwise normal ECG  Rate 101 bpm. NY interval 140 ms. QRS duration 74 ms. QT/QTc 334/433 ms. P-R-T axes 47 65 28.    Imaging:  Radiology results were communicated with the patient who voiced understanding of the findings.    CT Chest (PE) Abdomen Pelvis w Contrast  1.  There is no pulmonary embolus, aortic aneurysm or dissection.  2.  Multifocal bilateral pneumonia consistent with COVID-19 pneumonia.  3.  No acute abdominal or pelvic abnormality.    XR Chest Port 1 View  Negative chest.    Reading per radiology     Laboratory:  Laboratory findings were communicated with the patient who voiced understanding of the findings.    D Dimer (Collected 0017): 0.6 (H)  Lipase: 67 (L)  Troponin (Collected 0017): <0.015  HCG Qualitative: Negative    CBC: WBC 4.5, HGB 13.2,   CMP: Potassium 3.3 (L), Glucose 101 (H), BUN 6 (L), Calcium 8.1 (L) (Creatinine 0.62) o/w WNL       Interventions:   0018 NS 1000 mL IV  0022 GI Cocktail 30 mL PO  0023 Zofran 4 mg IV  0056 Ativan 0.5 mg IV  0057 Toradol 15 mg IV  0120 Dilaudid 0.5 mg IV  0133 Morphine 2 mg IV  0133 NS 1000 mL IV      Emergency  Department Course:    2352 Nursing notes and vitals reviewed.    2355 I performed an exam of the patient as documented above.     0017 EKG obtained as noted above.    0017 IV was inserted and blood was drawn for laboratory testing, results above.    0119 The patient was sent for XR while in the emergency department, results above.     0313 The patient was sent for CT while in the emergency department, results above.     0406 Findings and plan explained to the Patient. Patient discharged home with instructions regarding supportive care, medications, and reasons to return. The importance of close follow-up was reviewed. The patient was prescribed as below.    Impression & Plan     Medical Decision Making:  Patient presents with a multiplicity of complaints mainly chest pain and upper abdominal pain and not feeling well patient is known to have COVID-19.  Oxygen saturations are normal.  Patient had lab work-up which was unremarkable initially considered discharge without imaging however patient dents became severely tearful requiring opiates due to severe chest pain and upper abdominal pain.  Cause of her symptoms are unclear D-dimer was sent as patient is low risk for PE her prior D-dimer is been 1.5 and negative CTs have been found this was more than a year ago but due to elevated D-dimer and Covid 19 CT of the chest is performed and negative for PE and abdominal pelvis CT also shows no acute findings.  Patient was advised that currently there is no treatment recommendations for COVID-19 in the setting of normal oxygen saturations.  Patient does not require admission there is no primary cardiac primary pulmonary or primary intra-abdominal pathology seen by CT scan.  Patient is offered reassurance and follow-up with primary care and to return with increasing shortness of breath or to propyl to purchase a pulse oximeter to check her oxygen saturations going forward.    Covid-19  Stacia Hannon was evaluated during a  global COVID-19 pandemic, which necessitated consideration that the patient might be at risk for infection with the SARS-CoV-2 virus that causes COVID-19.   Applicable protocols for evaluation were followed during the patient's care.   COVID-19 was considered as part of the patient's evaluation. The plan for testing is:  a test was obtained at a previous visit and reviewed & considered today.      Diagnosis:     ICD-10-CM    1. Abdominal pain, epigastric  R10.13    2. Chest pain, unspecified type  R07.9    3. 2019 novel coronavirus disease (COVID-19)  U07.1         Disposition:  Discharged to home.    Discharge Medications:  New Prescriptions    HYDROCODONE-ACETAMINOPHEN (NORCO) 5-325 MG TABLET    Take 1 tablet by mouth every 6 hours as needed for pain       Scribe Disclosure:  KODY, Dior Chanel, am serving as a scribe at 11:42 PM on 10/31/2020 to document services personally performed by Eladio Hinojosa MD based on my observations and the provider's statements to me.      Eladio Hinojosa MD  11/02/20 0606     [id number]

## 2020-11-01 NOTE — ED TRIAGE NOTES
Pt states seen at this ED on 10/29 and had positive COVID-19 test from that visit. Pt states continuing to have symptoms with worsening cough and dyspnea. Pt also notes myalgias, fever and chills. ABCs intact GCS 15

## 2020-11-01 NOTE — TELEPHONE ENCOUNTER
"Patient calling - says she was tested for COVID19 in the ED 10/29/20 but has not received results.  Says she is now having chest pain and shortness of breath.  Positive COVID19 results given per below.       Triaged to disposition of Go to ED Now.    Arlet Bunch, RN  Triage Nurse Advisor      Coronavirus (COVID-19) Notification    Caller Name (Patient, parent, daughter/son, grandparent, etc)  Stacia Hannon - patient    Reason for call  Notify of Positive Coronavirus (COVID-19) lab results, assess symptoms,  review [employer] recommendations    Lab Result    Lab test:  2019-nCoV rRt-PCR or SARS-CoV-2 PCR    Oropharyngeal AND/OR nasopharyngeal swabs is POSITIVE for 2019-nCoV RNA/SARS-COV-2 PCR (COVID-19 virus)    RN Recommendations/Instructions per [employer] Coronavirus COVID-19 recommendations    Brief introduction script  Introduce self then review script:  \"I am calling on behalf of [employer].  We were notified that your Coronavirus test (COVID-19) for was POSITIVE for the virus.  I have some information to relay to you but first I wanted to mention that the [place] Dept of Health will be contacting you shortly [it's possible MD already called Patient] to talk to you more about how you are feeling and other people you have had contact with who might now also have the virus.  Also, [employer] is Partnering with the [facility] for Covid-19 research, you may be contacted directly by research staff.\"    Assessment (Inquire about Patient's current symptoms)   Assessment   Current Symptoms at time of phone call: (if no symptoms, document No symptoms] Fever, headache, diarrhea, nausea, chills, dizziness, chest pain   Symptoms onset (if applicable) Symptoms started 10/26/20     If at time of call, Patients symptoms hare worsened, the Patient should contact 911 or have someone drive them to Emergency Dept promptly:      If Patient calling 911, inform 911 personal that you have tested " positive for the Coronavirus (COVID-19).  Place mask on and await 911 to arrive.    If Emergency Dept, If possible, please have another adult drive you to the Emergency Dept but you need to wear mask when in contact with other people.      Review information with Patient    Your result was positive. This means you have COVID-19 (coronavirus).  We have sent you a letter that reviews the information that I'll be reviewing with you now.    How can I protect others?    If you have symptoms: stay home and away from others (self-isolate) until:    You've had no fever--and no medicine that reduces fever--for 1 full day (24 hours). And       Your other symptoms have gotten better. For example, your cough or breathing has improved. And     At least 10 days have passed since your symptoms started. (If you've been told by a doctor that you have a weak immune system, wait 20 days.)     If you don't have symptoms: Stay home and away from others (self-isolate) until at least 10 days have passed since your first positive COVID-19 test. (Date test collected)    During this time:    Stay in your own room, including for meals. Use your own bathroom if you can.    Stay away from others in your home. No hugging, kissing or shaking hands. No visitors.     Don't go to work, school or anywhere else.     Clean  high touch  surfaces often (doorknobs, counters, handles, etc.). Use a household cleaning spray or wipes. You'll find a full list on the EPA website at www.epa.gov/pesticide-registration/list-n-disinfectants-use-against-sars-cov-2.     Cover your mouth and nose with a mask, tissue or other face covering to avoid spreading germs.    Wash your hands and face often with soap and water.    Caregivers in these groups are at risk for severe illness due to COVID-19:  o People 65 years and older  o People who live in a nursing home or long-term care facility  o People with chronic disease (lung, heart, cancer, diabetes, kidney, liver,  immunologic)  o People who have a weakened immune system, including those who:  - Are in cancer treatment  - Take medicine that weakens the immune system, such as corticosteroids  - Had a bone marrow or organ transplant  - Have an immune deficiency  - Have poorly controlled HIV or AIDS  - Are obese (body mass index of 40 or higher)  - Smoke regularly    Caregivers should wear gloves while washing dishes, handling laundry and cleaning bedrooms and bathrooms.    Wash and dry laundry with special caution. Don't shake dirty laundry, and use the warmest water setting you can.    If you have a weakened immune system, ask your doctor about other actions you should take.    For more tips, go to www.cdc.gov/coronavirus/2019-ncov/downloads/10Things.pdf.    You should not go back to work until you meet the guidelines above for ending your home isolation. You don't need to be retested for COVID-19 before going back to work--studies show that you won't spread the virus if it's been at least 10 days since your symptoms started (or 20 days, if you have a weak immune system).    Employers: This document serves as formal notice of your employee's medical guidelines for going back to work. They must meet the above guidelines before going back to work in person.    How can I take care of myself?    1. Get lots of rest. Drink extra fluids (unless a doctor has told you not to).    2. Take Tylenol (acetaminophen) for fever or pain. If you have liver or kidney problems, ask your family doctor if it's okay to take Tylenol.     Take either:     650 mg (two 325 mg pills) every 4 to 6 hours, or     1,000 mg (two 500 mg pills) every 8 hours as needed.     Note: Don't take more than 3,000 mg in one day. Acetaminophen is found in many medicines (both prescribed and over-the-counter medicines). Read all labels to be sure you don't take too much.    For children, check the Tylenol bottle for the right dose (based on their age or weight).    3. If  you have other health problems (like cancer, heart failure, an organ transplant or severe kidney disease): Call your specialty clinic if you don't feel better in the next 2 days.    4. Know when to call 911: Emergency warning signs include:    Trouble breathing or shortness of breath    Pain or pressure in the chest that doesn't go away    Feeling confused like you haven't felt before, or not being able to wake up    Bluish-colored lips or face    5. Sign up for [employer]. We know it's scary to hear that you have COVID-19. We want to track your symptoms to make sure you're okay over the next 2 weeks. Please look for an email from [employer]--this is a free, online program that we'll use to keep in touch. To sign up, follow the link in the email. Learn more at [email or web address].    Where can I get more information?    [facility]: [email or web address]    Coronavirus Basics: www.health.[facility].[place]./diseases/coronavirus/basics.html    What to Do If You're Sick: www.cdc.gov/coronavirus/2019-ncov/about/steps-when-sick.html    Ending Home Isolation: www.cdc.gov/coronavirus/2019-ncov/hcp/disposition-in-home-patients.html     Caring for Someone with COVID-19: www.cdc.gov/coronavirus/2019-ncov/if-you-are-sick/care-for-someone.html     [facility] clinical trials (COVID-19 research studies): clinicalaffairs.[facility]-clinical-trials     A Positive COVID-19 letter will be sent via [employer] or the mail. (Exception, no letters sent to Presurgerical/Preprocedure Patients)    [Name]  Arlet Bunch RN  Triage Nurse Advisor       Reason for Disposition    SEVERE or constant chest pain or pressure (Exception: mild central chest pain, present only when coughing)    Additional Information    Negative: SEVERE difficulty breathing (e.g., struggling for each breath, speaks in single words)    Negative: Difficult to awaken or acting confused (e.g., disoriented, slurred speech)    Negative: Bluish  (or gray) lips or face now    Negative: Shock suspected (e.g., cold/pale/clammy skin, too weak to stand, low BP, rapid pulse)    Negative: Sounds like a life-threatening emergency to the triager    Negative: [1] COVID-19 exposure AND [2] no symptoms    Negative: COVID-19 and Breastfeeding, questions about    Negative: [1] Adult with possible COVID-19 symptoms AND [2] triager concerned about severity of symptoms or other causes    Protocols used: CORONAVIRUS (COVID-19) DIAGNOSED OR [id number] 8.4.20

## 2020-11-01 NOTE — DISCHARGE INSTRUCTIONS
We have done extensive testing due to chest and abdominal pain and other than finding some Covid related inflammation of the lungs we have found no other cause for your pain.  Clots there is no inflammation of the pancreas of the liver or the bowel.  Use medication for pain as needed.  Your oxygen levels were normal here if you begin to feel much more short of breath the emergency room is here to see you again.  Sitter getting a pulse oximeter to check your oximeter levels and if less than 90% return to the emergency room.

## 2020-11-01 NOTE — TELEPHONE ENCOUNTER
Coronavirus (COVID-19) Notification    Reason for call  Notify of POSITIVE  COVID-19 lab result, assess symptoms,  review [facility] recommendations    Lab Result   Lab test for 2019-nCoV rRt-PCR or SARS-COV-2 PCR  Oropharyngeal AND/OR nasopharyngeal swabs were POSITIVE for 2019-nCoV RNA [OR] SARS-COV-2 RNA (COVID-19) RNA     We have been unable to reach Patient by phone at this time to notify of their Positive COVID-19 result.  Left voicemail message requesting a call back to [phone] [facility] for results.        POSITIVE COVID-19 Letter sent.    Marlena Brito, MSN, RN

## 2020-11-01 NOTE — ED NOTES
"Patient states stomach pain \"comes and goes\" and she thinks its \"gas pains\" Patient took blood pressure cuff off again  "

## 2020-11-01 NOTE — ED NOTES
Bed: ED07  Expected date:   Expected time:   Means of arrival:   Comments:  HE - 27 F feeling of doom

## 2020-12-06 ENCOUNTER — HEALTH MAINTENANCE LETTER (OUTPATIENT)
Age: 25
End: 2020-12-06

## 2021-03-09 ENCOUNTER — HOSPITAL ENCOUNTER (EMERGENCY)
Facility: CLINIC | Age: 26
Discharge: HOME | End: 2021-03-09
Attending: EMERGENCY MEDICINE | Admitting: EMERGENCY MEDICINE
Payer: COMMERCIAL

## 2021-03-09 ENCOUNTER — APPOINTMENT (OUTPATIENT)
Dept: ULTRASOUND IMAGING | Facility: CLINIC | Age: 26
End: 2021-03-09
Attending: EMERGENCY MEDICINE
Payer: COMMERCIAL

## 2021-03-09 VITALS
OXYGEN SATURATION: 99 % | HEART RATE: 75 BPM | TEMPERATURE: 98.4 F | SYSTOLIC BLOOD PRESSURE: 122 MMHG | DIASTOLIC BLOOD PRESSURE: 72 MMHG | RESPIRATION RATE: 16 BRPM

## 2021-03-09 DIAGNOSIS — O20.0 THREATENED MISCARRIAGE IN EARLY PREGNANCY: ICD-10-CM

## 2021-03-09 LAB
B-HCG SERPL-ACNC: 2351 IU/L (ref 0–5)
BASOPHILS # BLD AUTO: 0 10E9/L (ref 0–0.2)
BASOPHILS NFR BLD AUTO: 0.2 %
DIFFERENTIAL METHOD BLD: NORMAL
EOSINOPHIL # BLD AUTO: 0.1 10E9/L (ref 0–0.7)
EOSINOPHIL NFR BLD AUTO: 1.5 %
ERYTHROCYTE [DISTWIDTH] IN BLOOD BY AUTOMATED COUNT: 11.9 % (ref 10–15)
HCT VFR BLD AUTO: 38 % (ref 35–47)
HGB BLD-MCNC: 12.4 G/DL (ref 11.7–15.7)
IMM GRANULOCYTES # BLD: 0 10E9/L (ref 0–0.4)
IMM GRANULOCYTES NFR BLD: 0.3 %
LYMPHOCYTES # BLD AUTO: 1.9 10E9/L (ref 0.8–5.3)
LYMPHOCYTES NFR BLD AUTO: 32.9 %
MCH RBC QN AUTO: 31.4 PG (ref 26.5–33)
MCHC RBC AUTO-ENTMCNC: 32.6 G/DL (ref 31.5–36.5)
MCV RBC AUTO: 96 FL (ref 78–100)
MONOCYTES # BLD AUTO: 0.3 10E9/L (ref 0–1.3)
MONOCYTES NFR BLD AUTO: 4.8 %
NEUTROPHILS # BLD AUTO: 3.6 10E9/L (ref 1.6–8.3)
NEUTROPHILS NFR BLD AUTO: 60.3 %
NRBC # BLD AUTO: 0 10*3/UL
NRBC BLD AUTO-RTO: 0 /100
PLATELET # BLD AUTO: 326 10E9/L (ref 150–450)
RBC # BLD AUTO: 3.95 10E12/L (ref 3.8–5.2)
WBC # BLD AUTO: 5.9 10E9/L (ref 4–11)

## 2021-03-09 PROCEDURE — 250N000013 HC RX MED GY IP 250 OP 250 PS 637: Performed by: EMERGENCY MEDICINE

## 2021-03-09 PROCEDURE — 85025 COMPLETE CBC W/AUTO DIFF WBC: CPT | Performed by: EMERGENCY MEDICINE

## 2021-03-09 PROCEDURE — 99285 EMERGENCY DEPT VISIT HI MDM: CPT | Mod: 25

## 2021-03-09 PROCEDURE — 76801 OB US < 14 WKS SINGLE FETUS: CPT

## 2021-03-09 PROCEDURE — 84702 CHORIONIC GONADOTROPIN TEST: CPT | Performed by: EMERGENCY MEDICINE

## 2021-03-09 RX ORDER — ACETAMINOPHEN 325 MG/1
650 TABLET ORAL ONCE
Status: COMPLETED | OUTPATIENT
Start: 2021-03-09 | End: 2021-03-09

## 2021-03-09 RX ADMIN — ACETAMINOPHEN 650 MG: 325 TABLET, FILM COATED ORAL at 18:07

## 2021-03-09 ASSESSMENT — ENCOUNTER SYMPTOMS
LIGHT-HEADEDNESS: 0
ABDOMINAL PAIN: 1
DIZZINESS: 0
DYSURIA: 0
FREQUENCY: 0
HEMATURIA: 0

## 2021-03-09 NOTE — ED PROVIDER NOTES
"  History   Chief Complaint:  Vaginal Bleeding       HPI   Stacia Hannon is a  6-7 week pregnant 25 year old female who presents with vaginal bleeding. The patient states that she developed light spotting on  () with associated abdominal cramping. She was seen at [facility] on  for abdominal pain and had a positive HCG (was unable to stay for imaging). The patient states that since the bleeding began, she has passed multiple clots with \"bright red blood\" every time she wipes, but is not saturating any pads or tampons. She has been treating her pain with Tylenol, with her last dose at 1130. The patient's last menstrual period was 10/26/2020 and states that they have been inconsistent since she removed her birth control. Denies any urinary symptoms, dizziness, or lightheadedness. The patient states that she had spotting during her previous pregnancies only after intercourse. She has not seen an OB/GYN yet, but gets care with [facility] and is scheduled for an appointment on .     Review of Systems   Gastrointestinal: Positive for abdominal pain.   Genitourinary: Positive for vaginal bleeding. Negative for dysuria, frequency and hematuria.   Neurological: Negative for dizziness and light-headedness.   All other systems reviewed and are negative.    Allergies:  No known drug allergies     Medications:  Prenatal Vitamins     Past Medical History:    Anemia   Pancreatitis   Obesity   Vitamin D deficiency     Past Surgical History:    Laparoscopic cholecystectomy     Family History:    Mother: diabetes, heart disease, hyperlipidemia, stroke     Social History:  Has 3 sons   Presents to emergency department alone     Physical Exam     Patient Vitals for the past 24 hrs:   BP Temp Temp src Pulse Resp SpO2   21 1412 122/72 98.4  F (36.9  C) Temporal 75 16 99 %       Physical Exam  General:   Well-nourished   Speaking in full sentences  Eyes:   Conjunctiva without injection or " scleral icterus  ENT:   Moist mucous membranes   Nares patent   Pinnae normal  Neck:   Full ROM   No stiffness appreciated  Resp:   Lungs CTAB   No crackles, wheezing or audible rubs   Good air movement  CV:    Normal rate, regular rhythm   S1 and S2 present   No murmur, gallop or rub  GI:   BS present   Abdomen soft without distention   Tenderness to palpation to supra-pubic and LLQ   Upper abdomen soft, NT   No guarding or rebound tenderness  Skin:   Warm, dry, well perfused   No rashes or open wounds on exposed skin  MSK:   Moves all extremities   No focal deformities or swelling  Neuro:   Alert   Answers questions appropriately   Moves all extremities equally   Gait stable  Psych:   Normal affect, normal mood        Emergency Department Course   Imaging:  US OB  1st trimester W Transvaginal   IMPRESSION:   Intrauterine gestation at 5 weeks and 1 day. Clinical and ultrasound follow-up suggested.  As read by Radiology.     Laboratory:  CBC: WBC: 5.9, HGB: 12.4, PLT: 326  HCG Qualitative Blood: 2351 (H)     Emergency Department Course:    Reviewed:  I reviewed nursing notes, vitals, past medical history and care everywhere    Assessments:  1727 I obtained history and examined the patient as noted above.   1930 I rechecked the patient and explained findings prior to discharge. Repeat abdominal exam is soft and non-tender    Interventions:  1807 Tylenol 650 mg PO     Disposition:  The patient was discharged to home.       Impression & Plan     Medical Decision Making:  Stacia Hannon is a 25 year old female who presents for evaluation of first trimester vaginal bleeding. VS on presentation unremarkable.  DDX includes threatened miscarriage, spontaneous AB, ectopic pregnancy, among others.  Work-up included ultrasound, and laboratory studies.  Current work-up is consistent with threatened miscarriage.  Quantitative hCG returned at 12,351.  Hemoglobin presently normal at 12.4.  For trimester ultrasound demonstrates  intrauterine gestational sac estimated 5 weeks 1 day, with possible early yolk sac.  No subchorionic hemorrhage is noted.  Patient is Rh+ on review of previous records, and thus does not require RhoGam.  Her initial exam revealed mild suprapubic and left-sided tenderness, though on reevaluation after Tylenol, her pain had resolved.  Repeat abdominal exam soft without any ongoing tenderness.  I discussed the broad differential with the patient including the need for close follow-up with OB in clinic in 1 to 2 days for repeat quantitative hCG.  We discussed the differential does include threatened miscarriage versus spontaneous AB and ectopic pregnancy.  Patient is understanding of the need to return to the ER including worsening pain, worsened bleeding, dizziness, lightheadedness, syncope, or any other new or troubling symptoms.  She will otherwise follow-up with her primary OB team in 1 to 2 days for recheck.  All of her questions were answered prior to discharge.    Diagnosis:    ICD-10-CM    1. Threatened miscarriage in early pregnancy  O20.0        Scribe Disclosure:  I, Stella Wallace, am serving as a scribe at 5:23 PM on 3/9/2021 to document services personally performed by Eladio Serrano MD based on my observations and the provider's statements to me.           Eladio Serrano MD  03/09/21 2128

## 2021-03-09 NOTE — ED TRIAGE NOTES
Patient presents with complaints of vaginal bleeding which has been ongoing since this weekend, when she went into UC and found out she was pregnant. Patient states she is 6-7 weeks along. ABC intact without need for intervention at this time.

## 2021-03-10 NOTE — ED NOTES
OB/GYN - Last Menstrual Cycle:  (pt comes in with vaginal bleeding and cramping that started on sunday. pt reports passing multiple small clots. was seen at  and found out she was preg. ) Contraception: none   Review of Systems (Sexuality Reproductive) - Contraception: none   Vaginal Bleeding - Vaginal Bleeding: present   Fetal Assessment - Fetal Movement: Absent   Additional Rows - Last Menstrual Cycle:  (pt comes in with vaginal bleeding and cramping that started on sunday. pt reports passing multiple small clots. was seen at  and found out she was preg. ) Fetal Movement: Absent

## 2021-03-10 NOTE — DISCHARGE INSTRUCTIONS
Discharge Instructions  Vaginal Bleeding in Pregnancy    Bleeding in early pregnancy can be a sign of a miscarriage or an abnormal pregnancy, but often is innocent and the pregnancy will continue normally. We may do blood pregnancy tests and ultrasound to try to determine what is causing the bleeding, but sometimes we are unable to tell. If this is the case, it will often require more time, more blood tests, and another ultrasound.     Generally, every Emergency Department visit should have a follow-up clinic visit with either a primary or a specialty clinic/provider. Please follow-up as instructed by your emergency provider today.    Return to the Emergency Department if:    You have severe abdominal (belly) or pelvic pain.    You faint, or feel lightheaded or dizzy.     Your bleeding gets much worse.    You pass any tissue--solid material that does not look like a blood clot. If you pass tissue, save it (even if you have to pull it out of the toilet) and put it in a plastic bag or jar and bring it in.      You have a fever of 100.5 F or higher.  If no pregnancy could be seen:    It may be that everything is normal and it is just too early to see the pregnancy. It is also possible that you could have an ectopic pregnancy, which is a pregnancy in an abnormal location, such as in the tube ( tubal pregnancy ). We don t see evidence that this is likely today but we cannot exclude it with certainty until a pregnancy can be seen in the uterus (womb) and an ectopic pregnancy can cause severe internal bleeding or death so follow-up is very important.    You should not be alone, in case you suddenly become very sick.     You should not have sex or put anything in your vagina.      Facts about miscarriage: We hope you do not have a miscarriage, but if you do, here are important things to know:    Early miscarriage is very common, and having one miscarriage does not mean you will have problems with another  pregnancy.    Nothing you did caused it. Taking medicine, drinking alcohol, having sex, exercising, or falling down will not cause a miscarriage.     If you were given a prescription for medicine here today, be sure to read all of the information (including the package insert) that comes with your prescription.  This will include important information about the medicine, its side effects, and any warnings that you need to know about.  The pharmacist who fills the prescription can provide more information and answer questions you may have about the medicine.  If you have questions or concerns that the pharmacist cannot address, please call or return to the Emergency Department.   Remember that you can always come back to the Emergency Department if you are not able to see your regular provider in the amount of time listed above, if you get any new symptoms, or if there is anything that worries you.

## 2021-03-11 ENCOUNTER — APPOINTMENT (OUTPATIENT)
Dept: ULTRASOUND IMAGING | Facility: CLINIC | Age: 26
End: 2021-03-11
Attending: EMERGENCY MEDICINE
Payer: COMMERCIAL

## 2021-03-11 ENCOUNTER — HOSPITAL ENCOUNTER (EMERGENCY)
Facility: CLINIC | Age: 26
Discharge: HOME | End: 2021-03-11
Attending: EMERGENCY MEDICINE | Admitting: EMERGENCY MEDICINE
Payer: COMMERCIAL

## 2021-03-11 VITALS
TEMPERATURE: 98.1 F | HEART RATE: 70 BPM | RESPIRATION RATE: 16 BRPM | BODY MASS INDEX: 32.42 KG/M2 | OXYGEN SATURATION: 99 % | WEIGHT: 183 LBS | SYSTOLIC BLOOD PRESSURE: 99 MMHG | DIASTOLIC BLOOD PRESSURE: 65 MMHG

## 2021-03-11 DIAGNOSIS — O20.0 THREATENED MISCARRIAGE: ICD-10-CM

## 2021-03-11 LAB
ANION GAP SERPL CALCULATED.3IONS-SCNC: 5 MMOL/L (ref 3–14)
B-HCG SERPL-ACNC: 4260 IU/L (ref 0–5)
BASOPHILS # BLD AUTO: 0 10E9/L (ref 0–0.2)
BASOPHILS NFR BLD AUTO: 0.5 %
BUN SERPL-MCNC: 8 MG/DL (ref 7–30)
CALCIUM SERPL-MCNC: 8.4 MG/DL (ref 8.5–10.1)
CHLORIDE SERPL-SCNC: 108 MMOL/L (ref 94–109)
CO2 SERPL-SCNC: 25 MMOL/L (ref 20–32)
CREAT SERPL-MCNC: 0.54 MG/DL (ref 0.52–1.04)
DIFFERENTIAL METHOD BLD: NORMAL
EOSINOPHIL # BLD AUTO: 0.2 10E9/L (ref 0–0.7)
EOSINOPHIL NFR BLD AUTO: 2.3 %
ERYTHROCYTE [DISTWIDTH] IN BLOOD BY AUTOMATED COUNT: 11.9 % (ref 10–15)
GFR SERPL CREATININE-BSD FRML MDRD: >90 ML/MIN/{1.73_M2}
GLUCOSE SERPL-MCNC: 77 MG/DL (ref 70–99)
HCT VFR BLD AUTO: 39.7 % (ref 35–47)
HGB BLD-MCNC: 12.7 G/DL (ref 11.7–15.7)
IMM GRANULOCYTES # BLD: 0 10E9/L (ref 0–0.4)
IMM GRANULOCYTES NFR BLD: 0.3 %
LYMPHOCYTES # BLD AUTO: 2.4 10E9/L (ref 0.8–5.3)
LYMPHOCYTES NFR BLD AUTO: 36.8 %
MCH RBC QN AUTO: 31.1 PG (ref 26.5–33)
MCHC RBC AUTO-ENTMCNC: 32 G/DL (ref 31.5–36.5)
MCV RBC AUTO: 97 FL (ref 78–100)
MONOCYTES # BLD AUTO: 0.6 10E9/L (ref 0–1.3)
MONOCYTES NFR BLD AUTO: 9.2 %
NEUTROPHILS # BLD AUTO: 3.3 10E9/L (ref 1.6–8.3)
NEUTROPHILS NFR BLD AUTO: 50.9 %
NRBC # BLD AUTO: 0 10*3/UL
NRBC BLD AUTO-RTO: 0 /100
PLATELET # BLD AUTO: 310 10E9/L (ref 150–450)
POTASSIUM SERPL-SCNC: 3.6 MMOL/L (ref 3.4–5.3)
RBC # BLD AUTO: 4.09 10E12/L (ref 3.8–5.2)
SODIUM SERPL-SCNC: 138 MMOL/L (ref 133–144)
WBC # BLD AUTO: 6.5 10E9/L (ref 4–11)

## 2021-03-11 PROCEDURE — 76801 OB US < 14 WKS SINGLE FETUS: CPT

## 2021-03-11 PROCEDURE — 84702 CHORIONIC GONADOTROPIN TEST: CPT | Performed by: EMERGENCY MEDICINE

## 2021-03-11 PROCEDURE — 85025 COMPLETE CBC W/AUTO DIFF WBC: CPT | Performed by: EMERGENCY MEDICINE

## 2021-03-11 PROCEDURE — 80048 BASIC METABOLIC PNL TOTAL CA: CPT | Performed by: EMERGENCY MEDICINE

## 2021-03-11 PROCEDURE — 99284 EMERGENCY DEPT VISIT MOD MDM: CPT | Mod: 25

## 2021-03-11 ASSESSMENT — ENCOUNTER SYMPTOMS
DIARRHEA: 0
HEMATURIA: 0
DYSURIA: 0
FEVER: 0
VOMITING: 0
BACK PAIN: 1

## 2021-03-11 NOTE — ED TRIAGE NOTES
Pt is aprox 5 weeks pregnant. Pt seen in ED for pelvic pain 2 days ago. Nothing seen in uterus due to early gestation. Pt states lower generalized bilateral abdominal pain. Pt was bleeding but stopped yesterday. ABC in tact. A/OX4

## 2021-03-12 NOTE — ED PROVIDER NOTES
History   Chief Complaint:  Abdominal Pain       The history is provided by the patient.      Stacia Hannon is a  25 year old female with a history of pancreatitis who presents for evaluation of dark vaginal discharge starting today. The patient was seen two days ago due to some reddish brown vaginal spotting. She was seen at that time after an ultrasound showed an intrauterine gestation at 5 weeks and 1 day with a HCG qualitative of 2,351. She has since had lower abdominal pain and noted red blood when she wiped today. She also notes of new left low back pain as well as a tender and swollen left areola. She denies any fever, cough, vomiting, diarrhea, hematuria or dysuria.     Review of Systems   Constitutional: Negative for fever.   Gastrointestinal: Negative for diarrhea and vomiting.   Genitourinary: Positive for vaginal bleeding and vaginal discharge. Negative for dysuria and hematuria.   Musculoskeletal: Positive for back pain (low left).   All other systems reviewed and are negative.    Allergies:  No Known Drug Allergies     Medications:  Prenatal  Ferrous sulfate    Past Medical History:    Pancreatitis  Iron deficiency anemia    Past Surgical History:    Laparoscopic cholecystectomy with cholangiograms     Social History:  The patient presents to the emergency department alone.      Physical Exam     Patient Vitals for the past 24 hrs:   BP Temp Temp src Pulse Resp SpO2 Weight   21 1654 99/65 98.1  F (36.7  C) Temporal 70 16 99 % 83 kg (183 lb)       Physical Exam  VS: Reviewed per above  HENT: normal speech  EYES: sclera anicteric  CV: Rate as noted, regular rhythm.   RESP: Effort normal. Breath sounds are normal bilaterally.  GI: no tenderness/rebound/guarding, not distended.  NEURO: Alert, moving all extremities  MSK: No deformity of the extremities. No left CVA tenderness.  SKIN: Warm and dry. Chaperoned breast exam revealed indurated and mildly tender left areola without redness or  warmth.    Emergency Department Course     Imaging:  US OB 1st Trimester W Transvaginal:  Single living intrauterine gestation at 5 weeks and 6 days, EDC 11/05/2021.  As per radiology.     Laboratory:  CBC: WBC: 6.5, HGB: 12.7, PLT: 310    BMP: Calcium 8.4 (low), o/w WNL (Creatinine: 0.54)    HCG Quantitative Blood: 4,260 (high)    Emergency Department Course:    Reviewed:  1808: I reviewed the patient's nursing notes, vitals, past medical records, Care Everywhere.     Assessments:  1811: I assessed the patient and performed a physical exam at this time.  2008: I reassessed the patient and informed them of their workup thus far. At this point I feel that the patient is safe for discharge, and the patient agrees.     Disposition:  The patient was discharged to home.     Impression & Plan       Medical Decision Making:  Patient presents to the ER for evaluation of abnormal dark vaginal discharge and left breast discomfort. On arrival vital signs are reassuring. On exam abdomen is soft and nontender without peritoneal signs. She is currently pregnant and hCG today is appropriately increasing. Hemoglobin is stable and she has no leukocytosis. She is Rh+ so no indication for RhoGam with this dark vaginal discharge which likely reflects recent bleeding. Ultrasound confirms living IUP at approximately 5 weeks 6 days. Patient did not wish to wait to provide a urine sample. On exam of the left breast, there is no evidence to suggest mastitis. Plan for warm compresses and return if increasing redness or warmth or fevers. Urged close OB/GYN follow-up regarding her vaginal spotting in pregnancy. Return precautions were discussed prior to discharge.    Diagnosis:    ICD-10-CM    1. Threatened miscarriage  O20.0        Scribe Disclosure:  I, Clay Whitt, am serving as a scribe at 6:07 PM on 3/11/2021 to document services personally performed by Ramses Adams MD based on my observations and the provider's statements to  me.          Ramses Adams MD  03/11/21 0893

## 2021-09-25 ENCOUNTER — HEALTH MAINTENANCE LETTER (OUTPATIENT)
Age: 26
End: 2021-09-25

## 2021-10-22 ENCOUNTER — HOSPITAL ENCOUNTER (EMERGENCY)
Facility: CLINIC | Age: 26
Discharge: HOME | End: 2021-10-23
Attending: EMERGENCY MEDICINE | Admitting: EMERGENCY MEDICINE
Payer: COMMERCIAL

## 2021-10-22 DIAGNOSIS — R51.9 ACUTE NONINTRACTABLE HEADACHE, UNSPECIFIED HEADACHE TYPE: ICD-10-CM

## 2021-10-22 LAB
BASOPHILS # BLD AUTO: 0 10E3/UL (ref 0–0.2)
BASOPHILS NFR BLD AUTO: 0 %
EOSINOPHIL # BLD AUTO: 0.2 10E3/UL (ref 0–0.7)
EOSINOPHIL NFR BLD AUTO: 3 %
ERYTHROCYTE [DISTWIDTH] IN BLOOD BY AUTOMATED COUNT: 12.2 % (ref 10–15)
HCT VFR BLD AUTO: 39.4 % (ref 35–47)
HGB BLD-MCNC: 13 G/DL (ref 11.7–15.7)
IMM GRANULOCYTES # BLD: 0 10E3/UL
IMM GRANULOCYTES NFR BLD: 0 %
LYMPHOCYTES # BLD AUTO: 3.1 10E3/UL (ref 0.8–5.3)
LYMPHOCYTES NFR BLD AUTO: 43 %
MCH RBC QN AUTO: 31.3 PG (ref 26.5–33)
MCHC RBC AUTO-ENTMCNC: 33 G/DL (ref 31.5–36.5)
MCV RBC AUTO: 95 FL (ref 78–100)
MONOCYTES # BLD AUTO: 0.5 10E3/UL (ref 0–1.3)
MONOCYTES NFR BLD AUTO: 7 %
NEUTROPHILS # BLD AUTO: 3.3 10E3/UL (ref 1.6–8.3)
NEUTROPHILS NFR BLD AUTO: 47 %
NRBC # BLD AUTO: 0 10E3/UL
NRBC BLD AUTO-RTO: 0 /100
PLATELET # BLD AUTO: 297 10E3/UL (ref 150–450)
RBC # BLD AUTO: 4.15 10E6/UL (ref 3.8–5.2)
WBC # BLD AUTO: 7.2 10E3/UL (ref 4–11)

## 2021-10-22 PROCEDURE — 96375 TX/PRO/DX INJ NEW DRUG ADDON: CPT

## 2021-10-22 PROCEDURE — 96374 THER/PROPH/DIAG INJ IV PUSH: CPT

## 2021-10-22 PROCEDURE — 36415 COLL VENOUS BLD VENIPUNCTURE: CPT | Performed by: EMERGENCY MEDICINE

## 2021-10-22 PROCEDURE — 80048 BASIC METABOLIC PNL TOTAL CA: CPT | Performed by: EMERGENCY MEDICINE

## 2021-10-22 PROCEDURE — 99284 EMERGENCY DEPT VISIT MOD MDM: CPT | Mod: 25

## 2021-10-22 PROCEDURE — 85004 AUTOMATED DIFF WBC COUNT: CPT | Performed by: EMERGENCY MEDICINE

## 2021-10-22 RX ORDER — KETOROLAC TROMETHAMINE 15 MG/ML
15 INJECTION, SOLUTION INTRAMUSCULAR; INTRAVENOUS ONCE
Status: COMPLETED | OUTPATIENT
Start: 2021-10-22 | End: 2021-10-23

## 2021-10-22 RX ORDER — DIPHENHYDRAMINE HYDROCHLORIDE 50 MG/ML
12.5 INJECTION INTRAMUSCULAR; INTRAVENOUS ONCE
Status: COMPLETED | OUTPATIENT
Start: 2021-10-22 | End: 2021-10-23

## 2021-10-22 RX ORDER — METOCLOPRAMIDE HYDROCHLORIDE 5 MG/ML
10 INJECTION INTRAMUSCULAR; INTRAVENOUS ONCE
Status: COMPLETED | OUTPATIENT
Start: 2021-10-22 | End: 2021-10-23

## 2021-10-22 ASSESSMENT — ENCOUNTER SYMPTOMS: SINUS PAIN: 1

## 2021-10-23 VITALS
RESPIRATION RATE: 16 BRPM | OXYGEN SATURATION: 98 % | SYSTOLIC BLOOD PRESSURE: 112 MMHG | DIASTOLIC BLOOD PRESSURE: 71 MMHG | TEMPERATURE: 97.6 F | HEART RATE: 76 BPM

## 2021-10-23 LAB
ANION GAP SERPL CALCULATED.3IONS-SCNC: 7 MMOL/L (ref 3–14)
BUN SERPL-MCNC: 9 MG/DL (ref 7–30)
CALCIUM SERPL-MCNC: 8.8 MG/DL (ref 8.5–10.1)
CHLORIDE BLD-SCNC: 106 MMOL/L (ref 94–109)
CO2 SERPL-SCNC: 26 MMOL/L (ref 20–32)
CREAT SERPL-MCNC: 0.41 MG/DL (ref 0.52–1.04)
GFR SERPL CREATININE-BSD FRML MDRD: >90 ML/MIN/1.73M2
GLUCOSE BLD-MCNC: 166 MG/DL (ref 70–99)
HOLD SPECIMEN: NORMAL
HOLD SPECIMEN: NORMAL
POTASSIUM BLD-SCNC: 3.4 MMOL/L (ref 3.4–5.3)
SODIUM SERPL-SCNC: 139 MMOL/L (ref 133–144)

## 2021-10-23 PROCEDURE — 258N000003 HC RX IP 258 OP 636: Performed by: EMERGENCY MEDICINE

## 2021-10-23 PROCEDURE — 250N000011 HC RX IP 250 OP 636: Performed by: EMERGENCY MEDICINE

## 2021-10-23 RX ORDER — DEXAMETHASONE SODIUM PHOSPHATE 10 MG/ML
10 INJECTION, SOLUTION INTRAMUSCULAR; INTRAVENOUS ONCE
Status: COMPLETED | OUTPATIENT
Start: 2021-10-23 | End: 2021-10-23

## 2021-10-23 RX ADMIN — KETOROLAC TROMETHAMINE 15 MG: 15 INJECTION, SOLUTION INTRAMUSCULAR; INTRAVENOUS at 00:06

## 2021-10-23 RX ADMIN — METOCLOPRAMIDE HYDROCHLORIDE 10 MG: 5 INJECTION INTRAMUSCULAR; INTRAVENOUS at 00:06

## 2021-10-23 RX ADMIN — SODIUM CHLORIDE 500 ML: 9 INJECTION, SOLUTION INTRAVENOUS at 00:05

## 2021-10-23 RX ADMIN — DIPHENHYDRAMINE HYDROCHLORIDE 12.5 MG: 50 INJECTION, SOLUTION INTRAMUSCULAR; INTRAVENOUS at 00:06

## 2021-10-23 RX ADMIN — DEXAMETHASONE SODIUM PHOSPHATE 10 MG: 10 INJECTION INTRAMUSCULAR; INTRAVENOUS at 01:14

## 2021-10-23 NOTE — ED TRIAGE NOTES
Rt side facial pain x 3 weeks.  Seen in UC 1 week ago and started on Augmentin for sinus infection.  Pt states pain is worsening.

## 2021-10-23 NOTE — ED PROVIDER NOTES
History   Chief Complaint:  Facial Pain       HPI   Stacia Hannon is a 26 year old femalewho presents with headache and facial pain. Patient has had this pain for three weeks.  Pain is described as sharp and shooting.  Pain is the greatest at her temple.  Pain extends over the top of the head down behind her right ear.  Seen in urgent care 1 week ago and started on Augmentin for sinus infection. Since then, her pain has worsened. She went to the dentist, where it was concluded that her pain was not attributable to dental source. Her pain has not gotten better prompting her visit in the ED. denies chance of pregnancy.  No neck pain.  She does have pain behind her right ear.  No drainage in the ear.  No URI symptoms cough or congestion.  No trauma.  Her symptoms did not improve with treatment with Augmentin.  She has not had nasal drainage.  No history of similar headaches.  No vomiting.    Review of Systems   HENT: Positive for sinus pain.    All other systems reviewed and are negative.      Allergies:  No Known Allergies    Medications:  calcium-vitamin D   Ferrous Sulfate     Past Medical History:     Anemia complicating pregnancy in third trimester    Pancreatitis  Vaginal delivery  Indication for care in labor or delivery  Abdominal pain complicating pregnancy  Fall  Vaginal delivery  Iron deficiency anemia secondary to inadequate dietary iron intake.     Past Surgical History:    Laparoscopic cholecystectomy with cholangiograms     Social History:  Patient unaccompanied  PCP: [facility], [place]    Physical Exam     Patient Vitals for the past 24 hrs:   BP Temp Temp src Pulse Resp SpO2   10/22/21 2253 (!) 145/58 97.6  F (36.4  C) Temporal 66 18 99 %       Physical Exam  Gen: alert  HEENT: PERRL, oropharynx clear  Neck: normal ROM  CV: RRR, no murmurs  Pulm: breath sounds equal, lungs clear  Abd: Soft, nontender  Back: no evidence of injury, no cva tenderness  MSK: no deformity, moves all  extremities  Skin: no rash  Neuro: alert, oriented x3, PERRL, EOMI, CN 2-7 and 9-12 intact, 5/5 grasp BUE, 5/5 elbow flexion and extension BUE, 5/5 shoulder abduction BUE, 5/5 hip flexion, knee flexion, knee extension, plantar and dorsiflexion BLE, no pronator drift, normal gait, negative romberg, no dysdiadochokinesia, normal finger-nose-finger testing       Emergency Department Course   Laboratory:  CBC: WBC 7.2, HGB 13.0,   BMP: Glucose 166 (H) o/w WNL (Creatinine 0.41 (L))     Emergency Department Course:  Reviewed:  I reviewed nursing notes, vitals, past medical history and Care Everywhere    Assessments/Consults  ED Course as of Oct 23 0103   Fri Oct 22, 2021   2320 Obtained history and examined the patient as noted above      Sat Oct 23, 2021   0100 Headache is gone on recheck. She does not want to wait for CT anymore          Interventions:  0005 0.9% soidum chloride BOLUS 500 mL, IV  0006 Metoclopramide 10 mg, IV  0006 Ketorolac 15 mg, IV  0006 Diphenhydramine 12.5 mg, IV  Dexamethasone 10 mg IV    Disposition:  The patient was discharged to home.     Impression & Plan   Medical Decision Making:  Patient presents for headache.  She has no neurologic signs or symptoms.  Neurologic exam is normal.  Offered head CT as this is new headache however patient feels improved after the above interventions and desires discharge home as it is quite late.  No signs of meningitis.  I did not feel this was likely due to sinusitis given lack of sinus symptoms.  No obvious dental infection on my exam.  Right ear TM and mastoid appear normal.  I suspect this is primary headache.  Given that this is new headache, recommended follow-up with neurology.  Signs and symptoms for which return to the emergency department discussed.  Discharge home    Diagnosis:    ICD-10-CM    1. Acute nonintractable headache, unspecified headache type  R51.9          Scribe Disclosure:  I, Vlad Colvin, am serving as a scribe at 11:20  PM on 10/22/2021 to document services personally performed by Tila Lombardo MD based on my observations and the provider's statements to me.          Tila Lombardo MD  10/23/21 0107

## 2022-01-15 ENCOUNTER — HEALTH MAINTENANCE LETTER (OUTPATIENT)
Age: 27
End: 2022-01-15

## 2022-03-09 ENCOUNTER — APPOINTMENT (OUTPATIENT)
Dept: GENERAL RADIOLOGY | Facility: CLINIC | Age: 27
End: 2022-03-09
Attending: EMERGENCY MEDICINE
Payer: COMMERCIAL

## 2022-03-09 ENCOUNTER — HOSPITAL ENCOUNTER (EMERGENCY)
Facility: CLINIC | Age: 27
Discharge: HOME | End: 2022-03-09
Attending: EMERGENCY MEDICINE | Admitting: EMERGENCY MEDICINE
Payer: COMMERCIAL

## 2022-03-09 VITALS
DIASTOLIC BLOOD PRESSURE: 57 MMHG | WEIGHT: 195 LBS | TEMPERATURE: 97.8 F | HEIGHT: 63 IN | SYSTOLIC BLOOD PRESSURE: 105 MMHG | OXYGEN SATURATION: 100 % | HEART RATE: 60 BPM | RESPIRATION RATE: 18 BRPM | BODY MASS INDEX: 34.55 KG/M2
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
## 2022-03-09 DIAGNOSIS — S63.501A WRIST SPRAIN, RIGHT, INITIAL ENCOUNTER: ICD-10-CM

## 2022-03-09 DIAGNOSIS — S46.911A STRAIN OF RIGHT SHOULDER, INITIAL ENCOUNTER: ICD-10-CM

## 2022-03-09 PROCEDURE — 73030 X-RAY EXAM OF SHOULDER: CPT | Mod: RT

## 2022-03-09 PROCEDURE — 99284 EMERGENCY DEPT VISIT MOD MDM: CPT

## 2022-03-09 PROCEDURE — 73110 X-RAY EXAM OF WRIST: CPT | Mod: RT

## 2022-03-09 PROCEDURE — 250N000013 HC RX MED GY IP 250 OP 250 PS 637: Performed by: EMERGENCY MEDICINE

## 2022-03-09 RX ORDER — IBUPROFEN 600 MG/1
600 TABLET, FILM COATED ORAL ONCE
Status: COMPLETED | OUTPATIENT
Start: 2022-03-09 | End: 2022-03-09

## 2022-03-09 RX ADMIN — IBUPROFEN 600 MG: 600 TABLET ORAL at 04:59

## 2022-03-09 ASSESSMENT — ENCOUNTER SYMPTOMS: ARTHRALGIAS: 1

## 2022-03-09 NOTE — ED PROVIDER NOTES
"  History     Chief Complaint:  Fall     The history is provided by the patient and medical records.      Stacia Hannon is a 26 year old female with history of iron deficiency anemia and obesity who presents with fall. Per triage note, the patient reports that she slipped and fell on her [employer] driveway on Monday afternoon, about 1.5 days ago, landing on her outstretched right arm. She notes worsening pain to her right shoulder and arm at this time. She reportedly did not hit her head during the fall.     Review of Systems   Musculoskeletal: Positive for arthralgias (R shoulder, R arm).   All other systems reviewed and are negative.    Allergies:  No Known Drug Allergies     Medications:  Calcium-Vitamin D  Ferrous Sulfate    Past Medical History:     Iron deficiency anemia  Obesity  Pancreatitis    Vitamin D deficiency    Past Surgical History:    Laparoscopic cholecystectomy    Family History:    Mother: Diabetes, high cholesterol    Social History:  Presents to the emergency department alone   Arrives via car     Physical Exam     Patient Vitals for the past 24 hrs:   BP Temp Temp src Pulse Resp SpO2 Height Weight   03/09/22 0437 -- -- -- -- -- -- 1.6 m (5' 3\") 88.5 kg (195 lb)   03/09/22 0436 111/80 97.8  F (36.6  C) Temporal 79 18 100 % -- --     Physical Exam  Nursing note and vitals reviewed.  Constitutional: Cooperative.   Cardiovascular: Normal rate, regular rhythm and normal heart sounds.  2+ right radial pulse  Pulmonary/Chest: Effort normal and breath sounds normal. No respiratory distress.   Abdominal: Soft. Normal appearance and bowel sounds are normal. No distension. There is no tenderness.   Musculoskeletal: Pain with range of motion of the right shoulder. Full range of motion of the right elbow. Pain with range of motion of the right wrist. Tenderness over the anatomic snuffbox.  Neurological: Alert. Strength and sensation in RUE normal.   Skin: Skin is warm and dry.   Psychiatric: Normal mood and " affect.     Emergency Department Course     Imaging:  XR Shoulder Right G/E 3 Views   Final Result   IMPRESSION: Normal joint spaces and alignment. No fracture.       XR Wrist Right G/E 3 Views   Final Result   IMPRESSION: Normal joint spaces and alignment. No fracture.        Report per radiology    Reviewed:  I reviewed nursing notes, vitals, past medical history and Care Everywhere    Assessments:  0441 I obtained history and examined the patient as noted above.   0513 I rechecked the patient and explained findings.     Interventions:  0459 Ibuprofen 600 mg PO    Disposition:  The patient was discharged to home.     Impression & Plan     Medical Decision Making:  Stacia Hannon is a 26 year old female who presents after falling on her outstretched right arm. She has pain with range of motion of her right shoulder. I am concerned for soft tissue injury given the negative radiograph. She certainly could have injured her labrum, rotator cuff muscle, etc. With regards to the wrist injury, there is no radiographic evidence of fracture or dislocation. That said, she does have tenderness to the anatomic snuffbox, making an occult scaphoid fracture possible. She was placed in a wrist splint with plan for orthopedic follow-up later this week.     Diagnosis:    ICD-10-CM    1. Fall, initial encounter  W19.XXXA    2. Strain of right shoulder, initial encounter  S46.911A    3. Wrist sprain, right, initial encounter  S63.501A        Scribe Disclosure:  I, Jose De Leon, am serving as a scribe on 3/9/2022 at 4:40 AM to document services personally performed by Cedric Rosales MD based on my observations and the provider's statements to me.             Cedric Rosales MD  03/09/22 0390

## 2022-03-09 NOTE — ED TRIAGE NOTES
"Pt arrives from home w/ complaint of R arm pain. Pt reports on Monday she slipped on her driveway on the ice, bracing her fall with her R arm, and that she heard a \"pop\" following. Pt reports worsening pain radiating from her R shoulder down to her fingers. Denies any other trauma, did not hit her head during fall. A&O X 4.   "

## 2022-09-01 ENCOUNTER — HOSPITAL ENCOUNTER (EMERGENCY)
Facility: CLINIC | Age: 27
Discharge: HOME | End: 2022-09-01
Attending: EMERGENCY MEDICINE | Admitting: EMERGENCY MEDICINE
Payer: COMMERCIAL

## 2022-09-01 ENCOUNTER — APPOINTMENT (OUTPATIENT)
Dept: ULTRASOUND IMAGING | Facility: CLINIC | Age: 27
End: 2022-09-01
Attending: EMERGENCY MEDICINE
Payer: COMMERCIAL

## 2022-09-01 VITALS
RESPIRATION RATE: 18 BRPM | OXYGEN SATURATION: 94 % | TEMPERATURE: 100 F | SYSTOLIC BLOOD PRESSURE: 101 MMHG | DIASTOLIC BLOOD PRESSURE: 72 MMHG | HEART RATE: 96 BPM

## 2022-09-01 DIAGNOSIS — N61.0 CELLULITIS OF LEFT BREAST: ICD-10-CM

## 2022-09-01 LAB
ANION GAP SERPL CALCULATED.3IONS-SCNC: 10 MMOL/L (ref 7–15)
BUN SERPL-MCNC: 8.1 MG/DL (ref 6–20)
CALCIUM SERPL-MCNC: 8.9 MG/DL (ref 8.6–10)
CHLORIDE SERPL-SCNC: 100 MMOL/L (ref 98–107)
CREAT SERPL-MCNC: 0.47 MG/DL (ref 0.51–0.95)
DEPRECATED HCO3 PLAS-SCNC: 27 MMOL/L (ref 22–29)
ERYTHROCYTE [DISTWIDTH] IN BLOOD BY AUTOMATED COUNT: 12.1 % (ref 10–15)
GFR SERPL CREATININE-BSD FRML MDRD: >90 ML/MIN/1.73M2
GLUCOSE SERPL-MCNC: 127 MG/DL (ref 70–99)
HCT VFR BLD AUTO: 39.8 % (ref 35–47)
HGB BLD-MCNC: 12.7 G/DL (ref 11.7–15.7)
MCH RBC QN AUTO: 30 PG (ref 26.5–33)
MCHC RBC AUTO-ENTMCNC: 31.9 G/DL (ref 31.5–36.5)
MCV RBC AUTO: 94 FL (ref 78–100)
PLATELET # BLD AUTO: 313 10E3/UL (ref 150–450)
POTASSIUM SERPL-SCNC: 4.6 MMOL/L (ref 3.4–5.3)
RBC # BLD AUTO: 4.23 10E6/UL (ref 3.8–5.2)
SODIUM SERPL-SCNC: 137 MMOL/L (ref 136–145)
WBC # BLD AUTO: 10.2 10E3/UL (ref 4–11)

## 2022-09-01 PROCEDURE — 96365 THER/PROPH/DIAG IV INF INIT: CPT

## 2022-09-01 PROCEDURE — 250N000011 HC RX IP 250 OP 636: Performed by: EMERGENCY MEDICINE

## 2022-09-01 PROCEDURE — 85014 HEMATOCRIT: CPT | Performed by: EMERGENCY MEDICINE

## 2022-09-01 PROCEDURE — 82374 ASSAY BLOOD CARBON DIOXIDE: CPT | Performed by: EMERGENCY MEDICINE

## 2022-09-01 PROCEDURE — 76642 ULTRASOUND BREAST LIMITED: CPT | Mod: LT

## 2022-09-01 PROCEDURE — 96366 THER/PROPH/DIAG IV INF ADDON: CPT

## 2022-09-01 PROCEDURE — 99285 EMERGENCY DEPT VISIT HI MDM: CPT | Mod: 25

## 2022-09-01 PROCEDURE — 250N000013 HC RX MED GY IP 250 OP 250 PS 637: Performed by: EMERGENCY MEDICINE

## 2022-09-01 PROCEDURE — 36415 COLL VENOUS BLD VENIPUNCTURE: CPT | Performed by: EMERGENCY MEDICINE

## 2022-09-01 RX ORDER — CEPHALEXIN 500 MG/1
500 CAPSULE ORAL 4 TIMES DAILY
Qty: 40 CAPSULE | Refills: 0 | Status: SHIPPED | OUTPATIENT
Start: 2022-09-01 | End: 2022-09-11

## 2022-09-01 RX ORDER — CEFAZOLIN SODIUM 2 G/100ML
2 INJECTION, SOLUTION INTRAVENOUS ONCE
Status: COMPLETED | OUTPATIENT
Start: 2022-09-01 | End: 2022-09-01

## 2022-09-01 RX ORDER — OXYCODONE HYDROCHLORIDE 5 MG/1
5 TABLET ORAL ONCE
Status: COMPLETED | OUTPATIENT
Start: 2022-09-01 | End: 2022-09-01

## 2022-09-01 RX ADMIN — CEFAZOLIN SODIUM 2 G: 2 INJECTION, SOLUTION INTRAVENOUS at 19:38

## 2022-09-01 RX ADMIN — OXYCODONE HYDROCHLORIDE 5 MG: 5 TABLET ORAL at 19:45

## 2022-09-01 ASSESSMENT — ENCOUNTER SYMPTOMS
FEVER: 1
VOMITING: 0
DIARRHEA: 0
COLOR CHANGE: 1

## 2022-09-01 ASSESSMENT — ACTIVITIES OF DAILY LIVING (ADL)
ADLS_ACUITY_SCORE: 37
ADLS_ACUITY_SCORE: 35

## 2022-09-01 NOTE — ED TRIAGE NOTES
Noticed lump on left breast nipple two days ago. Fever. Redness and swelling to nipple. Last ibuprofen at 1530. Using warm packs, no relief. Not breastfeeding.

## 2022-09-01 NOTE — ED PROVIDER NOTES
History   Chief Complaint:  Breast Problem       HPI   Stacia Hannon is a 26 year old female who presents with a breast problem. The patient reports onset of left breast pain a few days ago. She states 2 days ago she noticed a lump to her left breast near her nipple with associated redness, chills and fever. Her last ibuprofen was 1530 today. The patient denies vomiting or diarrhea. No discharge. She is not currently breast feeding or have any piercing's.     Review of Systems   Constitutional: Positive for fever.   Gastrointestinal: Negative for diarrhea and vomiting.   Skin: Positive for color change (Redness and swelling to left breast).   All other systems reviewed and are negative.    Allergies:  The patient has no known allergies.     Medications:  The patient is currently on no regular medications    Past Medical History:     Pancreatitis   Iron deficiency anemia   Obesity   Vitamin D deficiency     Past Surgical History:    Cholecystectomy with cholangiograms      Family History:    Mother: diabetes, hyperlipidemia, CVA, diabetes    Social History:  The patient presents to the ED alone   PCP: [facility], [place]     Physical Exam     Patient Vitals for the past 24 hrs:   BP Temp Temp src Pulse Resp SpO2   09/01/22 2040 105/73 -- -- 82 -- 98 %   09/01/22 1726 118/79 100  F (37.8  C) Oral 100 18 98 %       Physical Exam  Gen: well appearing, in no acute distress  Oral : Mucous membranes moist,   Nose: No rhinorhea  Ears: External near normal, without drainage  Eyes: periorbital tissues and sclera normal   Neck: supple, no abnormal swelling  Lungs: Clear bilaterally, no tachypnea or distress, speaks full sentences  CV: Regular rate, regular rhythm  Abd: soft, nontender, nondistended, no rebound/guarding  Ext: no lower extremity edema  Skin: Several cm of erythema without induration superior to the left nipple on the breast. warm, dry, well perfused,  Neuro: alert, no gross motor or sensory deficits,    Psych: pleasant mood, normal affect    Emergency Department Course   Imaging:  US Breast Left Limited 1-3 Quadrants   Preliminary Result   IMPRESSION:       1. Dilated ducts within the periareolar left breast; abscess is less likely, though superinfection is not excluded. Clinical and imaging follow-up to resolution is recommended, so as to exclude underlying malignancy.      Preliminary Interpretation Dictated By: Filippo Teixeira MD   Date: 09/01/2022           Report per radiology    Laboratory:  Labs Ordered and Resulted from Time of ED Arrival to Time of ED Departure   BASIC METABOLIC PANEL - Abnormal       Result Value    Creatinine 0.47 (*)     Sodium 137      Potassium 4.6      Urea Nitrogen 8.1      Chloride 100      Carbon Dioxide (CO2) 27      Anion Gap 10      Glucose 127 (*)     GFR Estimate >90      Calcium 8.9     CBC WITH PLATELETS - Normal    WBC Count 10.2      RBC Count 4.23      Hemoglobin 12.7      Hematocrit 39.8      MCV 94      MCH 30.0      MCHC 31.9      RDW 12.1      Platelet Count 313        Emergency Department Course:  Reviewed:  I reviewed nursing notes, vitals, past medical history and Care Everywhere    Assessments:  1808 I obtained history and examined the patient as noted above.   2240 I rechecked the patient and explained findings.    Interventions:  Medications   ceFAZolin (ANCEF) intermittent infusion 2 g in 100 mL dextrose PRE-MIX (0 g Intravenous Stopped 9/1/22 2226)   oxyCODONE (ROXICODONE) tablet 5 mg (5 mg Oral Given 9/1/22 1945)     Disposition:  The patient was discharged to home.     Impression & Plan   Medical Decision Making:  Patient presents emergency department with obvious cellulitis of her left breast.  It is not a large area there is no significant induration no palpable abscess.  Ultrasound shows no obvious abscess, will continue with antibiotic treatment.  Patient given first dose in the ED will send home with some Keflex.  Recommend over-the-counter pain meds  warm compresses continuation of antibiotics.  Will recommend follow-up with PCP this next week to ensure resolution of symptoms.  Patient is comfortable with management plan.    Diagnosis:    ICD-10-CM    1. Cellulitis of left breast  N61.0        Discharge Medications:  New Prescriptions    CEPHALEXIN (KEFLEX) 500 MG CAPSULE    Take 1 capsule (500 mg) by mouth 4 times daily for 10 days       Scribe Disclosure:  I, Joseph Raymond, am serving as a scribe at 6:06 PM on 9/1/2022 to document services personally performed by Bj Thakur MD based on my observations and the provider's statements to me.          Bj Thakur MD  09/01/22 3173

## 2022-11-10 ENCOUNTER — HOSPITAL ENCOUNTER (EMERGENCY)
Facility: CLINIC | Age: 27
Discharge: HOME | End: 2022-11-10
Payer: COMMERCIAL

## 2022-11-10 VITALS
HEART RATE: 63 BPM | TEMPERATURE: 98.4 F | SYSTOLIC BLOOD PRESSURE: 109 MMHG | RESPIRATION RATE: 18 BRPM | DIASTOLIC BLOOD PRESSURE: 76 MMHG | OXYGEN SATURATION: 100 %

## 2022-11-10 NOTE — ED TRIAGE NOTES
Seen in ED for left breast abscess a few months ago. Prescribed abx, with relief. Abscess is back. Reports subjective fever and pain. Denies injury, is not breastfeeding. VSS. ABCs intact.

## 2023-01-07 ENCOUNTER — HEALTH MAINTENANCE LETTER (OUTPATIENT)
Age: 28
End: 2023-01-07

## 2023-02-06 ENCOUNTER — ANCILLARY ORDERS (OUTPATIENT)
Dept: ULTRASOUND IMAGING | Facility: CLINIC | Age: 28
End: 2023-02-06

## 2023-02-06 DIAGNOSIS — O02.1 MISSED ABORTION: ICD-10-CM

## 2023-02-07 ENCOUNTER — HOSPITAL ENCOUNTER (OUTPATIENT)
Facility: CLINIC | Age: 28
Discharge: HOME | End: 2023-02-07
Attending: OBSTETRICS & GYNECOLOGY | Admitting: OBSTETRICS & GYNECOLOGY
Payer: COMMERCIAL

## 2023-02-07 ENCOUNTER — ANESTHESIA (OUTPATIENT)
Dept: SURGERY | Facility: CLINIC | Age: 28
End: 2023-02-07
Payer: COMMERCIAL

## 2023-02-07 ENCOUNTER — ANESTHESIA EVENT (OUTPATIENT)
Dept: SURGERY | Facility: CLINIC | Age: 28
End: 2023-02-07
Payer: COMMERCIAL

## 2023-02-07 ENCOUNTER — HOSPITAL ENCOUNTER (OUTPATIENT)
Dept: ULTRASOUND IMAGING | Facility: CLINIC | Age: 28
Discharge: HOME | End: 2023-02-07
Attending: OBSTETRICS & GYNECOLOGY | Admitting: OBSTETRICS & GYNECOLOGY
Payer: COMMERCIAL

## 2023-02-07 VITALS
HEART RATE: 60 BPM | TEMPERATURE: 97.2 F | HEIGHT: 63 IN | WEIGHT: 194 LBS | SYSTOLIC BLOOD PRESSURE: 106 MMHG | OXYGEN SATURATION: 99 % | BODY MASS INDEX: 34.38 KG/M2 | RESPIRATION RATE: 16 BRPM | DIASTOLIC BLOOD PRESSURE: 65 MMHG

## 2023-02-07 DIAGNOSIS — O02.1 MISSED ABORTION: ICD-10-CM

## 2023-02-07 PROCEDURE — 258N000003 HC RX IP 258 OP 636: Performed by: NURSE ANESTHETIST, CERTIFIED REGISTERED

## 2023-02-07 PROCEDURE — 250N000013 HC RX MED GY IP 250 OP 250 PS 637: Performed by: OBSTETRICS & GYNECOLOGY

## 2023-02-07 PROCEDURE — 250N000011 HC RX IP 250 OP 636: Performed by: NURSE ANESTHETIST, CERTIFIED REGISTERED

## 2023-02-07 PROCEDURE — 360N000075 HC SURGERY LEVEL 2, PER MIN: Performed by: OBSTETRICS & GYNECOLOGY

## 2023-02-07 PROCEDURE — 370N000017 HC ANESTHESIA TECHNICAL FEE, PER MIN: Performed by: OBSTETRICS & GYNECOLOGY

## 2023-02-07 PROCEDURE — 250N000009 HC RX 250: Performed by: NURSE ANESTHETIST, CERTIFIED REGISTERED

## 2023-02-07 PROCEDURE — 710N000009 HC RECOVERY PHASE 1, LEVEL 1, PER MIN: Performed by: OBSTETRICS & GYNECOLOGY

## 2023-02-07 PROCEDURE — 88305 TISSUE EXAM BY PATHOLOGIST: CPT | Mod: 26 | Performed by: PATHOLOGY

## 2023-02-07 PROCEDURE — 999N000063 US INTRAOPERATIVE: Mod: TC

## 2023-02-07 PROCEDURE — 710N000012 HC RECOVERY PHASE 2, PER MINUTE: Performed by: OBSTETRICS & GYNECOLOGY

## 2023-02-07 PROCEDURE — 250N000009 HC RX 250: Performed by: OBSTETRICS & GYNECOLOGY

## 2023-02-07 PROCEDURE — 88305 TISSUE EXAM BY PATHOLOGIST: CPT | Mod: TC | Performed by: OBSTETRICS & GYNECOLOGY

## 2023-02-07 PROCEDURE — 250N000025 HC SEVOFLURANE, PER MIN: Performed by: OBSTETRICS & GYNECOLOGY

## 2023-02-07 PROCEDURE — 999N000141 HC STATISTIC PRE-PROCEDURE NURSING ASSESSMENT: Performed by: OBSTETRICS & GYNECOLOGY

## 2023-02-07 PROCEDURE — 250N000011 HC RX IP 250 OP 636: Performed by: OBSTETRICS & GYNECOLOGY

## 2023-02-07 PROCEDURE — 272N000001 HC OR GENERAL SUPPLY STERILE: Performed by: OBSTETRICS & GYNECOLOGY

## 2023-02-07 RX ORDER — FENTANYL CITRATE 50 UG/ML
25 INJECTION, SOLUTION INTRAMUSCULAR; INTRAVENOUS EVERY 5 MIN PRN
Status: DISCONTINUED | OUTPATIENT
Start: 2023-02-07 | End: 2023-02-07 | Stop reason: HOSPADM

## 2023-02-07 RX ORDER — OXYCODONE HYDROCHLORIDE 5 MG/1
10 TABLET ORAL EVERY 4 HOURS PRN
Status: DISCONTINUED | OUTPATIENT
Start: 2023-02-07 | End: 2023-02-07 | Stop reason: HOSPADM

## 2023-02-07 RX ORDER — OXYCODONE HYDROCHLORIDE 5 MG/1
5 TABLET ORAL EVERY 4 HOURS PRN
Status: DISCONTINUED | OUTPATIENT
Start: 2023-02-07 | End: 2023-02-07 | Stop reason: HOSPADM

## 2023-02-07 RX ORDER — DEXAMETHASONE SODIUM PHOSPHATE 4 MG/ML
INJECTION, SOLUTION INTRA-ARTICULAR; INTRALESIONAL; INTRAMUSCULAR; INTRAVENOUS; SOFT TISSUE PRN
Status: DISCONTINUED | OUTPATIENT
Start: 2023-02-07 | End: 2023-02-07

## 2023-02-07 RX ORDER — IBUPROFEN 800 MG/1
800 TABLET, FILM COATED ORAL ONCE
Status: DISCONTINUED | OUTPATIENT
Start: 2023-02-07 | End: 2023-02-07 | Stop reason: HOSPADM

## 2023-02-07 RX ORDER — LIDOCAINE HYDROCHLORIDE 10 MG/ML
INJECTION, SOLUTION INFILTRATION; PERINEURAL PRN
Status: DISCONTINUED | OUTPATIENT
Start: 2023-02-07 | End: 2023-02-07

## 2023-02-07 RX ORDER — BUPIVACAINE HYDROCHLORIDE 2.5 MG/ML
INJECTION, SOLUTION INFILTRATION; PERINEURAL PRN
Status: DISCONTINUED | OUTPATIENT
Start: 2023-02-07 | End: 2023-02-07 | Stop reason: HOSPADM

## 2023-02-07 RX ORDER — PROPOFOL 10 MG/ML
INJECTION, EMULSION INTRAVENOUS PRN
Status: DISCONTINUED | OUTPATIENT
Start: 2023-02-07 | End: 2023-02-07

## 2023-02-07 RX ORDER — SODIUM CHLORIDE, SODIUM LACTATE, POTASSIUM CHLORIDE, CALCIUM CHLORIDE 600; 310; 30; 20 MG/100ML; MG/100ML; MG/100ML; MG/100ML
INJECTION, SOLUTION INTRAVENOUS CONTINUOUS
Status: DISCONTINUED | OUTPATIENT
Start: 2023-02-07 | End: 2023-02-07 | Stop reason: HOSPADM

## 2023-02-07 RX ORDER — ACETAMINOPHEN 325 MG/1
975 TABLET ORAL ONCE
Status: COMPLETED | OUTPATIENT
Start: 2023-02-07 | End: 2023-02-07

## 2023-02-07 RX ORDER — KETOROLAC TROMETHAMINE 30 MG/ML
INJECTION, SOLUTION INTRAMUSCULAR; INTRAVENOUS PRN
Status: DISCONTINUED | OUTPATIENT
Start: 2023-02-07 | End: 2023-02-07

## 2023-02-07 RX ORDER — ONDANSETRON 2 MG/ML
INJECTION INTRAMUSCULAR; INTRAVENOUS PRN
Status: DISCONTINUED | OUTPATIENT
Start: 2023-02-07 | End: 2023-02-07

## 2023-02-07 RX ORDER — METRONIDAZOLE 500 MG/100ML
500 INJECTION, SOLUTION INTRAVENOUS ONCE
Status: COMPLETED | OUTPATIENT
Start: 2023-02-07 | End: 2023-02-07

## 2023-02-07 RX ORDER — HYDROMORPHONE HCL IN WATER/PF 6 MG/30 ML
0.2 PATIENT CONTROLLED ANALGESIA SYRINGE INTRAVENOUS EVERY 5 MIN PRN
Status: DISCONTINUED | OUTPATIENT
Start: 2023-02-07 | End: 2023-02-07 | Stop reason: HOSPADM

## 2023-02-07 RX ORDER — HYDROMORPHONE HCL IN WATER/PF 6 MG/30 ML
0.4 PATIENT CONTROLLED ANALGESIA SYRINGE INTRAVENOUS EVERY 5 MIN PRN
Status: DISCONTINUED | OUTPATIENT
Start: 2023-02-07 | End: 2023-02-07 | Stop reason: HOSPADM

## 2023-02-07 RX ORDER — SODIUM CHLORIDE, SODIUM LACTATE, POTASSIUM CHLORIDE, CALCIUM CHLORIDE 600; 310; 30; 20 MG/100ML; MG/100ML; MG/100ML; MG/100ML
INJECTION, SOLUTION INTRAVENOUS CONTINUOUS PRN
Status: DISCONTINUED | OUTPATIENT
Start: 2023-02-07 | End: 2023-02-07

## 2023-02-07 RX ORDER — ONDANSETRON 2 MG/ML
4 INJECTION INTRAMUSCULAR; INTRAVENOUS EVERY 30 MIN PRN
Status: DISCONTINUED | OUTPATIENT
Start: 2023-02-07 | End: 2023-02-07 | Stop reason: HOSPADM

## 2023-02-07 RX ORDER — ONDANSETRON 4 MG/1
4 TABLET, ORALLY DISINTEGRATING ORAL EVERY 30 MIN PRN
Status: DISCONTINUED | OUTPATIENT
Start: 2023-02-07 | End: 2023-02-07 | Stop reason: HOSPADM

## 2023-02-07 RX ORDER — FENTANYL CITRATE 50 UG/ML
50 INJECTION, SOLUTION INTRAMUSCULAR; INTRAVENOUS EVERY 5 MIN PRN
Status: DISCONTINUED | OUTPATIENT
Start: 2023-02-07 | End: 2023-02-07 | Stop reason: HOSPADM

## 2023-02-07 RX ORDER — OXYCODONE HYDROCHLORIDE 5 MG/1
5 TABLET ORAL
Status: DISCONTINUED | OUTPATIENT
Start: 2023-02-07 | End: 2023-02-07 | Stop reason: HOSPADM

## 2023-02-07 RX ORDER — FENTANYL CITRATE 50 UG/ML
INJECTION, SOLUTION INTRAMUSCULAR; INTRAVENOUS PRN
Status: DISCONTINUED | OUTPATIENT
Start: 2023-02-07 | End: 2023-02-07

## 2023-02-07 RX ORDER — ACETAMINOPHEN 325 MG/1
975 TABLET ORAL ONCE
Status: DISCONTINUED | OUTPATIENT
Start: 2023-02-07 | End: 2023-02-07 | Stop reason: HOSPADM

## 2023-02-07 RX ORDER — METOPROLOL TARTRATE 1 MG/ML
1-2 INJECTION, SOLUTION INTRAVENOUS EVERY 5 MIN PRN
Status: DISCONTINUED | OUTPATIENT
Start: 2023-02-07 | End: 2023-02-07 | Stop reason: HOSPADM

## 2023-02-07 RX ADMIN — ACETAMINOPHEN 975 MG: 325 TABLET, FILM COATED ORAL at 11:08

## 2023-02-07 RX ADMIN — MIDAZOLAM 2 MG: 1 INJECTION INTRAMUSCULAR; INTRAVENOUS at 14:32

## 2023-02-07 RX ADMIN — DEXAMETHASONE SODIUM PHOSPHATE 8 MG: 4 INJECTION, SOLUTION INTRA-ARTICULAR; INTRALESIONAL; INTRAMUSCULAR; INTRAVENOUS; SOFT TISSUE at 14:37

## 2023-02-07 RX ADMIN — ONDANSETRON 4 MG: 2 INJECTION INTRAMUSCULAR; INTRAVENOUS at 14:44

## 2023-02-07 RX ADMIN — METRONIDAZOLE 500 MG: 500 INJECTION, SOLUTION INTRAVENOUS at 11:11

## 2023-02-07 RX ADMIN — KETOROLAC TROMETHAMINE 30 MG: 30 INJECTION, SOLUTION INTRAMUSCULAR at 15:01

## 2023-02-07 RX ADMIN — FENTANYL CITRATE 100 MCG: 50 INJECTION, SOLUTION INTRAMUSCULAR; INTRAVENOUS at 14:37

## 2023-02-07 RX ADMIN — SODIUM CHLORIDE, POTASSIUM CHLORIDE, SODIUM LACTATE AND CALCIUM CHLORIDE: 600; 310; 30; 20 INJECTION, SOLUTION INTRAVENOUS at 14:32

## 2023-02-07 RX ADMIN — PROPOFOL 200 MG: 10 INJECTION, EMULSION INTRAVENOUS at 14:37

## 2023-02-07 RX ADMIN — LIDOCAINE HYDROCHLORIDE 50 MG: 10 INJECTION, SOLUTION INFILTRATION; PERINEURAL at 14:37

## 2023-02-07 ASSESSMENT — ACTIVITIES OF DAILY LIVING (ADL)
ADLS_ACUITY_SCORE: 37

## 2023-02-07 NOTE — ANESTHESIA PREPROCEDURE EVALUATION
Anesthesia Pre-Procedure Evaluation    Patient: Stacia Hannon   MRN: [id number] : 1995        Procedure : Procedure(s):  Ultrasound Guided Suction Dilation and Curettage          Past Medical History:   Diagnosis Date     Anemia 2014     Pancreatitis 2014      Past Surgical History:   Procedure Laterality Date     LAPAROSCOPIC CHOLECYSTECTOMY WITH CHOLANGIOGRAMS N/A 2014    Procedure: LAPAROSCOPIC CHOLECYSTECTOMY WITH CHOLANGIOGRAMS;  Surgeon: Sheldon Watts MD;  Location: [facility] OR      Allergies   Allergen Reactions     Doxycycline Other (See Comments)     Diarrhea      Social History     Tobacco Use     Smoking status: Never     Smokeless tobacco: Never   Substance Use Topics     Alcohol use: No      Wt Readings from Last 1 Encounters:   23 87.1 kg (192 lb)        Anesthesia Evaluation            ROS/MED HX  ENT/Pulmonary:  - neg pulmonary ROS     Neurologic:  - neg neurologic ROS     Cardiovascular:  - neg cardiovascular ROS     METS/Exercise Tolerance: >4 METS    Hematologic:  - neg hematologic  ROS     Musculoskeletal:  - neg musculoskeletal ROS     GI/Hepatic:  - neg GI/hepatic ROS     Renal/Genitourinary:  - neg Renal ROS     Endo: Comment: .Body mass index is 34.01 kg/m .        Psychiatric/Substance Use:  - neg psychiatric ROS     Infectious Disease:  - neg infectious disease ROS     Malignancy:  - neg malignancy ROS     Other:  - neg other ROS          Physical Exam    Airway        Mallampati: II    Neck ROM: full     Respiratory Devices and Support         Dental           Cardiovascular   cardiovascular exam normal       Rhythm and rate: regular     Pulmonary   pulmonary exam normal                OUTSIDE LABS:  CBC:   Lab Results   Component Value Date    WBC 10.2 2022    WBC 7.2 10/22/2021    HGB 12.7 2022    HGB 13.0 10/22/2021    HCT 39.8 2022    HCT 39.4 10/22/2021     2022     10/22/2021     BMP:   Lab Results   Component  Value Date     09/01/2022     10/22/2021    POTASSIUM 4.6 09/01/2022    POTASSIUM 3.4 10/22/2021    CHLORIDE 100 09/01/2022    CHLORIDE 106 10/22/2021    CO2 27 09/01/2022    CO2 26 10/22/2021    BUN 8.1 09/01/2022    BUN 9 10/22/2021    CR 0.47 (L) 09/01/2022    CR 0.41 (L) 10/22/2021     (H) 09/01/2022     (H) 10/22/2021     COAGS:   Lab Results   Component Value Date    INR 1.16 (H) 09/24/2014     POC:   Lab Results   Component Value Date     (H) 07/26/2018    HCG Negative 08/07/2019    HCGS Negative 11/01/2020     HEPATIC:   Lab Results   Component Value Date    ALBUMIN 3.5 11/01/2020    PROTTOTAL 8.4 11/01/2020    ALT 29 11/01/2020    AST 18 11/01/2020    ALKPHOS 63 11/01/2020    BILITOTAL 0.2 11/01/2020     OTHER:   Lab Results   Component Value Date    LACT 0.9 03/12/2017    A1C 5.3 01/27/2012    CAROL 8.9 09/01/2022    PHOS 4.1 05/17/2014    MAG 1.8 05/17/2014    LIPASE 67 (L) 11/01/2020    AMYLASE 49 06/08/2015    TSH 1.58 07/26/2018    CRP <5.0 05/01/2013    SED 26 (H) 05/01/2013       Anesthesia Plan    ASA Status:  2      Anesthesia Type: General.     - Airway: LMA   Induction: Intravenous, Propofol.   Maintenance: Balanced.        Consents    Anesthesia Plan(s) and associated risks, benefits, and realistic alternatives discussed. Questions answered and patient/representative(s) expressed understanding.    - Discussed:     - Discussed with:  Patient         Postoperative Care    Pain management: IV analgesics, Oral pain medications, Multi-modal analgesia.   PONV prophylaxis: Ondansetron (or other 5HT-3), Dexamethasone or Solumedrol     Comments:                Micky Loving DO

## 2023-02-07 NOTE — OP NOTE
Suction Dilation and Curettage Note:    Preoperative Diagnosis:  1.  6 week intrauterine pregnancy with a missed .    Postoperative Diagnosis:  Same    Procedure:  Suction dilation and curettage under US guidance     Surgeon:  Max Martinez MD    Circulator: Ashley Stephens RN  Relief Circulator: Dominic Mehta RN  Relief Scrub: Yareli Patel  Scrub Person: Roberto Carlos ArevalomanDinah    Anesthesiologist: Spencer Maglalanes MD  CRNA: Rachael Dobson APRN CRNA    Anesthesia:  LMA, cervical block    Findings:  A 12 weeks sized anteverted uterus, moderate amounts of products of conception.  No evidence of uterine perforation.        Complications:  None    Specimens:  Products of conception.    Estimated Blood Loss:   50mL    Indications:  Stacia Hannon is a 27 year old female  who has a missed  confirmed by the presence of a gestational sac in uterus w/o heart beat followed by repeat US again w/o heartbeat 14 days later. Her blood type is Rh+. She as counselled in office for expt mgmg vs medical vs operative. On 2/3/23 she elected medical mgmg over the weekend. On 23 she called office and reported minimal cramping only no bleeding and that she was too nervous to try medical mgmt requesting surgical intervention.   Stacia has consented for surgical management of this problem by dilation and curettage.  Her consent form is signed and on the chart.  She declined genetic studies.    Description of Operative Procedure:    Stacia Hannon was taken to the operating room with IV fluids running. Surgical briefing completed. She was placed on the operating table and LMA anesthesia was obtained without difficulty.  Stacia was then place in the dorsal lithotomy position with legs in the yellow fin stirrups.  She was prepped and draped in the usual sterile fashion. Surgical timeout completed.    Attention was then turned to the vagina.  A weight speculum was placed into the vagina and the  anterior lip of the cervix was grasped with a single tooth tenaculum.  The cervix was noted to be dilated.  The cervix was then sequentially dilated to 10 millimeters using blunt cervical dilators.  The 10 mm suction curette was activated and pressure check showed adequate suction into green range, and then introduced into the uterine cavity.  The suction curette was rotated in order to clear the uterus of all products of conception with several passes in 360 degrees until x2 passes with no further products of conception and BSUS showed empty uterine cavity. Suction device removed. Cervical block administered with 20cc of 0.25% bupivacaine. Tenaculum removed and right puncture site had brisk bleed that was controlled with pressure and silver nitrate x2. Thereafter, minimal bleeding was noted.  All instruments were removed from the vagina.  The uterus was massaged and found to be firm. Specimen to be sent sent to pathology.  Stacia tolerated the procedure well. Plans for anesthesia reversal and then to PACU for recovery upon my exit from OR.    Max Martinez MD

## 2023-02-07 NOTE — ANESTHESIA POSTPROCEDURE EVALUATION
Patient: Stacia Hannon    Procedure: Procedure(s):  Ultrasound Guided Suction Dilation and Curettage       Anesthesia Type:  General    Note:  Disposition: Outpatient   Postop Pain Control: Uneventful            Sign Out: Well controlled pain   PONV: No   Neuro/Psych: Uneventful            Sign Out: Acceptable/Baseline neuro status   Airway/Respiratory: Uneventful            Sign Out: Acceptable/Baseline resp. status   CV/Hemodynamics: Uneventful            Sign Out: Acceptable CV status; No obvious hypovolemia; No obvious fluid overload   Other NRE: NONE   DID A NON-ROUTINE EVENT OCCUR? No           Last vitals:  Vitals Value Taken Time   /61 02/07/23 1515   Temp 97.6  F (36.4  C) 02/07/23 1510   Pulse 58 02/07/23 1520   Resp 17 02/07/23 1520   SpO2 100 % 02/07/23 1520   Vitals shown include unvalidated device data.    Electronically Signed By: Spencer Magallanes MD  February 7, 2023  3:22 PM

## 2023-02-07 NOTE — ANESTHESIA CARE TRANSFER NOTE
Patient: Stacia Hannon    Procedure: Procedure(s):  Ultrasound Guided Suction Dilation and Curettage       Diagnosis: Missed  [O02.1]  Diagnosis Additional Information: No value filed.    Anesthesia Type:   General     Note:    Oropharynx: oral airway in place  Level of Consciousness: drowsy  Oxygen Supplementation: face mask    Independent Airway: airway patency satisfactory and stable  Dentition: dentition unchanged  Vital Signs Stable: post-procedure vital signs reviewed and stable  Report to RN Given: handoff report given  Patient transferred to: PACU    Handoff Report: Identifed the Patient, Identified the Reponsible Provider, Reviewed the pertinent medical history, Discussed the surgical course, Reviewed Intra-OP anesthesia mangement and issues during anesthesia, Set expectations for post-procedure period and Allowed opportunity for questions and acknowledgement of understanding      Vitals:  Vitals Value Taken Time   BP     Temp     Pulse     Resp     SpO2         Electronically Signed By: GEORGIA Brady CRNA  2023  3:12 PM

## 2023-02-07 NOTE — DISCHARGE INSTRUCTIONS
Dr Martinez [phone]    Take tylenol 1000mg every 6 hours for pain.  Take motrin 600-800mg every 6-8 hours for pain (not on empty stomach).  Can take all medications listed at same time as they all have different mechanisms of action.  Use miralax daily after 1-2 days if constipated.    GENERAL ANESTHESIA OR SEDATION ADULT DISCHARGE INSTRUCTIONS   SPECIAL PRECAUTIONS FOR 24 HOURS AFTER SURGERY    IT IS NOT UNUSUAL TO FEEL LIGHT-HEADED OR FAINT, UP TO 24 HOURS AFTER SURGERY OR WHILE TAKING PAIN MEDICATION.  IF YOU HAVE THESE SYMPTOMS; SIT FOR A FEW MINUTES BEFORE STANDING AND HAVE SOMEONE ASSIST YOU WHEN YOU GET UP TO WALK OR USE THE BATHROOM.    YOU SHOULD REST AND RELAX FOR THE NEXT 24 HOURS AND YOU MUST MAKE ARRANGEMENTS TO HAVE SOMEONE STAY WITH YOU FOR AT LEAST 24 HOURS AFTER YOUR DISCHARGE.  AVOID HAZARDOUS AND STRENUOUS ACTIVITIES.  DO NOT MAKE IMPORTANT DECISIONS FOR 24 HOURS.    DO NOT DRIVE ANY VEHICLE OR OPERATE MECHANICAL EQUIPMENT FOR 24 HOURS FOLLOWING THE END OF YOUR SURGERY.  EVEN THOUGH YOU MAY FEEL NORMAL, YOUR REACTIONS MAY BE AFFECTED BY THE MEDICATION YOU HAVE RECEIVED.    DO NOT DRINK ALCOHOLIC BEVERAGES FOR 24 HOURS FOLLOWING YOUR SURGERY.    DRINK CLEAR LIQUIDS (APPLE JUICE, GINGER ALE, 7-UP, BROTH, ETC.).  PROGRESS TO YOUR REGULAR DIET AS YOU FEEL ABLE.    YOU MAY HAVE A DRY MOUTH, A SORE THROAT, MUSCLES ACHES OR TROUBLE SLEEPING.  THESE SHOULD GO AWAY AFTER 24 HOURS.    CALL YOUR DOCTOR FOR ANY OF THE FOLLOWING:  SIGNS OF INFECTION (FEVER, GROWING TENDERNESS AT THE SURGERY SITE, A LARGE AMOUNT OF DRAINAGE OR BLEEDING, SEVERE PAIN, FOUL-SMELLING DRAINAGE, REDNESS OR SWELLING.    IT HAS BEEN OVER 8 TO 10 HOURS SINCE SURGERY AND YOU ARE STILL NOT ABLE TO URINATE (PASS WATER).     You received Toradol, an IV form of Ibuprofen (Motrin) at 3pm.  Do not take any Ibuprofen products until 9pm  Maximum acetaminophen (Tylenol) dose from all sources should not exceed 4 grams (4000 mg) per  day.    DILATION AND CURETTAGE    AFTER YOUR DILATION AND CURETTAGE  -  You can expect some cramping for a few hours after the D & C.  This can be controlled with an over-the-counter pain reliever.  -  You may have some light bleeding for a few weeks.  Use pads instead of tampons.  -  Take showers instead of baths for about a week.  Ask your healthcare provider if you should avoid exercising or having sex for a period of time.      WHEN TO CALL YOUR HEALTHCARE PROVIDER    -  Heavy bleeding (more than 1 pad an hour).  -  A fever of 100.4 F (38 C) or higher, or as directed by your healthcare provider.  -  Increasing belly pain, tenderness, or cramping.  -  Foul-smelling discharge.

## 2023-02-10 LAB
PATH REPORT.COMMENTS IMP SPEC: NORMAL
PATH REPORT.COMMENTS IMP SPEC: NORMAL
PATH REPORT.FINAL DX SPEC: NORMAL
PATH REPORT.GROSS SPEC: NORMAL
PATH REPORT.MICROSCOPIC SPEC OTHER STN: NORMAL
PATH REPORT.RELEVANT HX SPEC: NORMAL
PHOTO IMAGE: NORMAL

## 2023-03-07 VITALS
TEMPERATURE: 98.8 F | OXYGEN SATURATION: 100 % | HEART RATE: 66 BPM | RESPIRATION RATE: 18 BRPM | DIASTOLIC BLOOD PRESSURE: 76 MMHG | SYSTOLIC BLOOD PRESSURE: 127 MMHG

## 2023-03-07 PROCEDURE — 10060 I&D ABSCESS SIMPLE/SINGLE: CPT

## 2023-03-07 PROCEDURE — 99285 EMERGENCY DEPT VISIT HI MDM: CPT | Mod: 25

## 2023-03-07 PROCEDURE — 76642 ULTRASOUND BREAST LIMITED: CPT

## 2023-03-07 RX ORDER — CEPHALEXIN 500 MG/1
500 CAPSULE ORAL ONCE
Status: DISCONTINUED | OUTPATIENT
Start: 2023-03-07 | End: 2023-03-08 | Stop reason: HOSPADM

## 2023-03-08 ENCOUNTER — HOSPITAL ENCOUNTER (EMERGENCY)
Facility: CLINIC | Age: 28
Discharge: HOME | End: 2023-03-08
Attending: EMERGENCY MEDICINE | Admitting: EMERGENCY MEDICINE
Payer: COMMERCIAL

## 2023-03-08 ENCOUNTER — APPOINTMENT (OUTPATIENT)
Dept: ULTRASOUND IMAGING | Facility: CLINIC | Age: 28
End: 2023-03-08
Attending: EMERGENCY MEDICINE
Payer: COMMERCIAL

## 2023-03-08 DIAGNOSIS — N61.0 CELLULITIS OF LEFT BREAST: ICD-10-CM

## 2023-03-08 DIAGNOSIS — N61.1 LEFT BREAST ABSCESS: ICD-10-CM

## 2023-03-08 PROCEDURE — 250N000013 HC RX MED GY IP 250 OP 250 PS 637: Performed by: EMERGENCY MEDICINE

## 2023-03-08 PROCEDURE — 76642 ULTRASOUND BREAST LIMITED: CPT | Mod: LT

## 2023-03-08 PROCEDURE — 87077 CULTURE AEROBIC IDENTIFY: CPT | Performed by: EMERGENCY MEDICINE

## 2023-03-08 PROCEDURE — 87070 CULTURE OTHR SPECIMN AEROBIC: CPT | Performed by: EMERGENCY MEDICINE

## 2023-03-08 RX ORDER — SULFAMETHOXAZOLE/TRIMETHOPRIM 800-160 MG
1 TABLET ORAL ONCE
Status: COMPLETED | OUTPATIENT
Start: 2023-03-08 | End: 2023-03-08

## 2023-03-08 RX ORDER — SULFAMETHOXAZOLE/TRIMETHOPRIM 800-160 MG
1 TABLET ORAL 2 TIMES DAILY
Qty: 14 TABLET | Refills: 0 | Status: ON HOLD | OUTPATIENT
Start: 2023-03-08 | End: 2023-03-15

## 2023-03-08 RX ORDER — CEPHALEXIN 500 MG/1
500 CAPSULE ORAL ONCE
Status: COMPLETED | OUTPATIENT
Start: 2023-03-08 | End: 2023-03-08

## 2023-03-08 RX ORDER — CEPHALEXIN 500 MG/1
500 CAPSULE ORAL 4 TIMES DAILY
Qty: 28 CAPSULE | Refills: 0 | Status: ON HOLD | OUTPATIENT
Start: 2023-03-08 | End: 2023-03-15

## 2023-03-08 RX ADMIN — SULFAMETHOXAZOLE AND TRIMETHOPRIM 1 TABLET: 800; 160 TABLET ORAL at 02:48

## 2023-03-08 RX ADMIN — CEPHALEXIN 500 MG: 500 CAPSULE ORAL at 02:47

## 2023-03-08 NOTE — ED NOTES
PIT/Triage Evaluation    Patient presented with 3 weeks of left breast pain. She was seen in the ED in 9/2022 and given abx. Sent home with Keflex. Reports surgery for this left breast cyst in 11/2022. Feels like it is returning. Took ibuprofen tonight around 1900 with mild improvement.    Tried making Gyn appointment, but her car broke down, so she couldn't make the appointment.    Exam is notable for:    Skin (nursing chaparoned exam): erythema and induration without gross fluctuance on the lateral aspect of the left breast    Appropriate interventions for symptom management were initiated if applicable.  Appropriate diagnostic tests were initiated if indicated.    Important information for subsequent clinician:    Will order US, likely cellulitis.    I briefly evaluated the patient and developed an initial plan of care. I discussed this plan and explained that this brief interaction does not constitute a full evaluation. Patient/family understands that they should wait to be fully evaluated and discuss any test results with another clinician prior to leaving the hospital.       Jimmie Burris,   03/07/23 8077

## 2023-03-08 NOTE — ED TRIAGE NOTES
Pt states cyst on left breast that has returned. Pt states surgical removal in November and noticed 3 weeks ago the cyst had started to come back. Ibuprofen at 1900. ABCs intact and AOx4.      Triage Assessment     Row Name 03/07/23 7728       Triage Assessment (Adult)    Airway WDL WDL       Respiratory WDL    Respiratory WDL WDL       Cardiac WDL    Cardiac WDL WDL

## 2023-03-08 NOTE — ED PROVIDER NOTES
History     Chief Complaint:  Cyst       HPI      This 26 y/o female presented with 3 weeks of left breast pain. She was seen in the ED in 9/2022 and given abx. Sent home with Keflex. Reports surgery for this left breast cyst in 11/2022. Feels like it is returning. Took ibuprofen tonight around 1900 with mild improvement.     Tried making Gyn appointment, but her car broke down, so she couldn't make the appointment.      Independent Historian:    None - Patient Only    Review of External Notes:    2/7/23: Op Note (D&C - miscarriage)  12/20: Gen surg follow up  11/23/22: Procedure visit  11/22//22: Gen Surgery visit  11/17 and 11/21/22 - OB/Gyn office visit and telephone encounter  9/1//22: ED note - Abx    ROS:  See HPI    Allergies:  Doxycycline     Physical Exam     Patient Vitals for the past 24 hrs:   BP Temp Temp src Pulse Resp SpO2   03/07/23 2340 127/76 98.8  F (37.1  C) Oral 66 18 100 %        Physical Exam  HENT:  mmm. Normal phonation.  Eyes: PERRL B/L  Neck: Supple  CV: Peripheral pulses in tact and regular  Resp: Speaking in full sentences without any respiratory distress  Skin: (nursing chaparoned exam): erythema and induration without gross fluctuance on the lateral aspect of the left breast      Emergency Department Course     Imaging:  POC US SOFT TISSUE   Final Result   [facility] Procedure Note       Limited Bedside ED Ultrasound of Soft Tissue:      PROCEDURE: PERFORMED BY: Dr. Jimmie Burris, DO   INDICATIONS/SYMPTOM: Skin redness, evaluate for abscess, cellulitis or foreign body   PROBE: High frequency linear probe   BODY LOCATION: Soft tissue located on left breast       FINDINGS: Cobblestoning of soft tissue: present    Hypoechoic fluid (ie abscess) identified: present        INTERPRETATION:  The soft tissue and muscle layers were evaluated.       Findings indicate cellulitis and abscess      IMAGE DOCUMENTATION: Images were not archived.         US Breast Left Limited 1-3 Quadrants     (Results Pending)      Report per radiology    Laboratory:  Labs Ordered and Resulted from Time of ED Arrival to Time of ED Departure - No data to display     Procedures       Incision and Drainage     Procedure: Incision and Drainage     Consent: Verbal    Indication: Abscess    Location: Left lateral breast    Ultrasound Guidance: Yes, see separate procedural guidance POCUS note     Preparation: Povidone-iodine     Anesthesia/Sedation: Lidocaine with Epinephrine - 1%     Procedure Detail:    Aspiration: Yes (serosanguinous, but non-grossly purulent fluid)  Incision Type: None  Scalpel: N/A  Lesion Management: Aspiration  Wound Management: N/A  Packing: None     Patient Status: The patient tolerated the procedure well: Yes. There were no complications.      Emergency Department Course & Assessments:         Interventions:  Medications   cephALEXin (KEFLEX) capsule 500 mg (500 mg Oral $Given 3/8/23 4537)   sulfamethoxazole-trimethoprim (BACTRIM DS) 800-160 MG per tablet 1 tablet (1 tablet Oral $Given 3/8/23 3775)        Assessments, Independent Interpretation, Consult/Discussion of ManagementTests:     ED Course as of 03/08/23 0934   Wed Mar 08, 2023   0224 Breast aspiration complete       Social Determinants of Health affecting care:  None       Disposition:  The patient was discharged to home.     Impression & Plan    CMS Diagnoses: None      Medical Decision Making:  This 27-year-old female patient presents to the ED due to concerns for a  recurrent left breast abscess.  Please see HPI and exam..  The patient does have what appears to be overlying cellulitis and a fluctuant area on the lateral aspect of the left breast.  Rec review notes operative incision and drainage in November.  The patient did have a follow-up gynecology appointment but missed it because her car was not working.  Ultrasound interpretation has not been formally read but there is a question of abscess on the ultrasound tech report.  Bedside  ultrasound does suggest cobblestoning as well as a fluid collection underneath the skin.  Aspiration was attempted and serosanguineous fluid (but not purulent) was obtained. The first dose of antibiotics was given here.    At this point, I will plan on discharging the patient with antibiotics.  I will encourage her to follow-up with the breast surgeon who did her last procedure she may certainly return with new or worsening symptoms.  Anticipatory guidance given prior to discharge.    Addendum:    I reviewed the official radiology interpretation as below. I had already completed needle aspiration of this site and dispositioned the patient prior to this result. Recommendations for follow up care are unchanged.    US Breast Left Limited 1-3 Quadrants   Final Result   FINDINGS: Targeted ultrasound evaluation of the left upper outer  breast at the site of concern demonstrates an irregular hypoechoic  area spanning approximately 4.7 x 1.1 x 0.8 cm in maximal dimensions,  concerning for an abscess.     IMPRESSION: BI-RADS CATEGORY: 2 - Benign Finding(s).      RECOMMENDED FOLLOW-UP: Clinical Follow-up.   Clinical follow-up is recommended, with management dependent on the   results/findings of the physical examination.       OGDWIN LLANES MD            SYSTEM ID:  [id number]          Critical Care time:  was 0 minutes for this patient excluding procedures.    Diagnosis:    ICD-10-CM    1. Cellulitis of left breast  N61.0       2. Left breast abscess  N61.1            Discharge Medications:  Discharge Medication List as of 3/8/2023  2:45 AM      START taking these medications    Details   cephALEXin (KEFLEX) 500 MG capsule Take 1 capsule (500 mg) by mouth 4 times daily for 7 days, Disp-28 capsule, R-0, E-Prescribe      sulfamethoxazole-trimethoprim (BACTRIM DS) 800-160 MG tablet Take 1 tablet by mouth 2 times daily for 7 days, Disp-14 tablet, R-0, E-Prescribe                3/7/2023   Jimmie Burris,        Historical  Data:  ______________________________________________________________________  Medications:    cephALEXin (KEFLEX) 500 MG capsule  sulfamethoxazole-trimethoprim (BACTRIM DS) 800-160 MG tablet  calcium-vitamin D 500-125 MG-UNIT TABS        Past Medical History:   Past Surgical History:     Past Medical History:   Diagnosis Date     Anemia 05/18/2014     Pancreatitis 08/2014    Past Surgical History:   Procedure Laterality Date     DILATION AND CURETTAGE SUCTION WITH ULTRASOUND GUIDANCE N/A 2/7/2023    Procedure: Ultrasound Guided Suction Dilation and Curettage;  Surgeon: Max Martinez MD;  Location:  OR     LAPAROSCOPIC CHOLECYSTECTOMY WITH CHOLANGIOGRAMS N/A 9/24/2014    Procedure: LAPAROSCOPIC CHOLECYSTECTOMY WITH CHOLANGIOGRAMS;  Surgeon: Sheldon Watts MD;  Location:  OR      Patient Active Problem List    Diagnosis Date Noted     Iron deficiency anemia secondary to inadequate dietary iron intake 12/22/2017     Priority: Medium     Vaginal delivery 12/21/2017     Priority: Medium     Abdominal pain complicating pregnancy 12/05/2017     Priority: Medium     Fall 12/05/2017     Priority: Medium     Indication for care in labor or delivery 10/25/2017     Priority: Medium     Pancreatitis 09/04/2014     Priority: Medium          Family History:    family history is not on file. Social History:   reports that she has never smoked. She has never used smokeless tobacco. She reports that she does not drink alcohol and does not use drugs.     PCP: [facility], [place], Bradley Joseph, DO  03/08/23 0235       Jimmie Burris, DO  03/08/23 0930

## 2023-03-12 ENCOUNTER — TELEPHONE (OUTPATIENT)
Dept: EMERGENCY MEDICINE | Facility: CLINIC | Age: 28
End: 2023-03-12
Payer: COMMERCIAL

## 2023-03-12 LAB — BACTERIA ABSC ANAEROBE+AEROBE CULT: ABNORMAL

## 2023-03-12 NOTE — RESULT ENCOUNTER NOTE
At 2:28P, Left voicemail message requesting a call back to [facility] ED Lab Result RN at [phone].  RN is available every day between 9 a.m. and 5:30 p.m.  See Telephone encounter.

## 2023-03-12 NOTE — TELEPHONE ENCOUNTER
[facility] Emergency Department/Urgent Care Lab result notification:    [place] ED lab result protocol used  Culture    Reason for call  Notify of lab results, assess symptoms,  review ED providers recommendations/discharge instructions (if necessary) and advise per ED lab result f/u protocol    Lab Result   Final Abscess Aerobic Bacterial Culture (specimen - Left Breast) report on 3/12/23  [facility] Emergency Dept discharge antibiotic prescribed: Cephalexin (Keflex) 500 mg capsule, 1 capsule (500 mg) by mouth 4 times daily for 7 days & Sulfamethoxazole-Trimethoprim (Bactrim DS, Septra DS) 800-160 mg PO tablet,  1 tablet by mouth 2 times daily for 7 days.  #1. Bacteria, Staphylococcus epidermidis.    Susceptibilities not routinely done  Incision and Drainage performed in the [place] ED:  Yes  Recommendations in treatment per [facility] ED lab result culture protocol    Information table from Emergency Dept Provider visit on 3/8/23  Symptoms reported at ED visit (Chief complaint, HPI) Chief Complaint:  Cyst        HPI      This 28 y/o female presented with 3 weeks of left breast pain. She was seen in the ED in 9/2022 and given abx. Sent home with Keflex. Reports surgery for this left breast cyst in 11/2022. Feels like it is returning. Took ibuprofen tonight around 1900 with mild improvement.     Tried making Gyn appointment, but her car broke down, so she couldn't make the appointment.   ED providers Impression and Plan (applicable information) Medical Decision Making:  This 27-year-old female patient presents to the ED due to concerns for a  recurrent left breast abscess.  Please see HPI and exam..  The patient does have what appears to be overlying cellulitis and a fluctuant area on the lateral aspect of the left breast.  Rec review notes operative incision and drainage in November.  The patient did have a follow-up gynecology appointment but missed it because her car was not working.   Ultrasound interpretation has not been formally read but there is a question of abscess on the ultrasound tech report.  Bedside ultrasound does suggest cobblestoning as well as a fluid collection underneath the skin.  Aspiration was attempted and serosanguineous fluid (but not purulent) was obtained. The first dose of antibiotics was given here.     At this point, I will plan on discharging the patient with antibiotics.  I will encourage her to follow-up with the breast surgeon who did her last procedure she may certainly return with new or worsening symptoms.  Anticipatory guidance given prior to discharge.   Miscellaneous information See addendum from ED Provider note     RN Assessment (Patient s current Symptoms), include time called.  [Insert Left message here if message left]  Await call back    RN Recommendations/Instructions per [place] ED lab result protocol  At 2:28P, Left voicemail message requesting a call back to [facility] ED Lab Result RN at [phone].  RN is available every day between 9 a.m. and 5:30 p.m.  See Telephone encounter.      Paco Borrero RN  [facility] [employer] [place]  Emergency Dept Lab Result RN  Ph# [phone]     Copy of Lab result

## 2023-03-12 NOTE — RESULT ENCOUNTER NOTE
Corrected note from 12:27PM  Final Abscess Aerobic Bacterial Culture (specimen - Left Breast) report on 3/12/23  [facility] Emergency Dept discharge antibiotic prescribed: Cephalexin (Keflex) 500 mg capsule, 1 capsule (500 mg) by mouth 4 times daily for 7 days & Sulfamethoxazole-Trimethoprim (Bactrim DS, Septra DS) 800-160 mg PO tablet,  1 tablet by mouth 2 times daily for 7 days.  #1. Bacteria, Staphylococcus epidermidis.   Susceptibilities not routinely done  Incision and Drainage performed in the [place] ED: Yes  Recommendations in treatment per [facility] ED lab result culture protocol

## 2023-03-12 NOTE — RESULT ENCOUNTER NOTE
Final Abscess Aerobic Bacterial Culture (specimen - Left Breast) report on 3/12/23  [facility] Emergency Dept discharge antibiotic prescribed: Cephalexin (Keflex) 500 mg capsule, 1 capsule (500 mg) by mouth 4 times daily for 7 days & Sulfamethoxazole-Trimethoprim (Bactrim DS, Septra DS) 800-160 mg PO tablet,  1 tablet by mouth 2 times daily for 7 days.  #1. Bacteria, Staphylococcus epidermidis.     Incision and Drainage performed in the [place] ED: Susceptibilities not routinely done  Recommendations in treatment per [facility] ED lab result culture protocol

## 2023-03-13 NOTE — TELEPHONE ENCOUNTER
[facility] Emergency Department/Urgent Care Lab result notification     Patient/parent Name  Stacia YEUNG Assessment (Patient s current Symptoms), include time called.  [Insert Left message here if message left]  Swelling is down but the redness is the same  Tomorrow will be last day of antibiotics  Just a little discomfort from where they aspirated    Lab result (if applicable):  Final Abscess Aerobic Bacterial Culture (specimen - Left Breast) report on 3/12/23  [facility] Emergency Dept discharge antibiotic prescribed: Cephalexin (Keflex) 500 mg capsule, 1 capsule (500 mg) by mouth 4 times daily for 7 days & Sulfamethoxazole-Trimethoprim (Bactrim DS, Septra DS) 800-160 mg PO tablet,  1 tablet by mouth 2 times daily for 7 days.  #1. Bacteria, Staphylococcus epidermidis.    Susceptibilities not routinely done  Incision and Drainage performed in the [place] ED:  Yes  Recommendations in treatment per [facility] ED lab result culture protocol    RN Recommendations/Instructions per [place] ED lab result protocol  Patient notified of lab result and treatment recommendations.   She has contacted the [facility] and they want her to get the Ultrasound sent over.  She was given medical records number and was transferred to medical records     Please Contact your PCP clinic or return to the Emergency department if your:    Symptoms worsen or other concerning symptom's.    PCP follow-up Questions asked: YES       Paco Borrero RN  [facility] [employer] [place]  Emergency Dept Lab Result RN  Ph# [phone]

## 2023-03-15 ENCOUNTER — APPOINTMENT (OUTPATIENT)
Dept: SURGERY | Facility: PHYSICIAN GROUP | Age: 28
End: 2023-03-15
Payer: COMMERCIAL

## 2023-03-15 ENCOUNTER — ANESTHESIA EVENT (OUTPATIENT)
Dept: SURGERY | Facility: CLINIC | Age: 28
End: 2023-03-15
Payer: COMMERCIAL

## 2023-03-15 ENCOUNTER — APPOINTMENT (OUTPATIENT)
Dept: ULTRASOUND IMAGING | Facility: CLINIC | Age: 28
End: 2023-03-15
Attending: EMERGENCY MEDICINE
Payer: COMMERCIAL

## 2023-03-15 ENCOUNTER — HOSPITAL ENCOUNTER (OUTPATIENT)
Facility: CLINIC | Age: 28
Setting detail: OBSERVATION
Discharge: HOME | End: 2023-03-15
Attending: EMERGENCY MEDICINE | Admitting: HOSPITALIST
Payer: COMMERCIAL

## 2023-03-15 ENCOUNTER — ANESTHESIA (OUTPATIENT)
Dept: SURGERY | Facility: CLINIC | Age: 28
End: 2023-03-15
Payer: COMMERCIAL

## 2023-03-15 VITALS
WEIGHT: 189.1 LBS | SYSTOLIC BLOOD PRESSURE: 93 MMHG | HEART RATE: 57 BPM | HEIGHT: 63 IN | RESPIRATION RATE: 16 BRPM | BODY MASS INDEX: 33.5 KG/M2 | DIASTOLIC BLOOD PRESSURE: 61 MMHG | TEMPERATURE: 96.9 F | OXYGEN SATURATION: 96 %

## 2023-03-15 DIAGNOSIS — N61.1 BREAST ABSCESS: ICD-10-CM

## 2023-03-15 DIAGNOSIS — N61.0 CELLULITIS OF LEFT BREAST: ICD-10-CM

## 2023-03-15 LAB
ANION GAP SERPL CALCULATED.3IONS-SCNC: 13 MMOL/L (ref 7–15)
BASOPHILS # BLD AUTO: 0 10E3/UL (ref 0–0.2)
BASOPHILS NFR BLD AUTO: 1 %
BUN SERPL-MCNC: 12.2 MG/DL (ref 6–20)
CALCIUM SERPL-MCNC: 8.8 MG/DL (ref 8.6–10)
CHLORIDE SERPL-SCNC: 100 MMOL/L (ref 98–107)
CREAT SERPL-MCNC: 0.58 MG/DL (ref 0.51–0.95)
DEPRECATED HCO3 PLAS-SCNC: 21 MMOL/L (ref 22–29)
EOSINOPHIL # BLD AUTO: 0.2 10E3/UL (ref 0–0.7)
EOSINOPHIL NFR BLD AUTO: 3 %
ERYTHROCYTE [DISTWIDTH] IN BLOOD BY AUTOMATED COUNT: 12.1 % (ref 10–15)
GFR SERPL CREATININE-BSD FRML MDRD: >90 ML/MIN/1.73M2
GLUCOSE SERPL-MCNC: 122 MG/DL (ref 70–99)
GRAM STAIN RESULT: NORMAL
HCG SERPL QL: NEGATIVE
HCT VFR BLD AUTO: 36.8 % (ref 35–47)
HGB BLD-MCNC: 12.2 G/DL (ref 11.7–15.7)
IMM GRANULOCYTES # BLD: 0 10E3/UL
IMM GRANULOCYTES NFR BLD: 0 %
LYMPHOCYTES # BLD AUTO: 2.8 10E3/UL (ref 0.8–5.3)
LYMPHOCYTES NFR BLD AUTO: 41 %
MCH RBC QN AUTO: 30.7 PG (ref 26.5–33)
MCHC RBC AUTO-ENTMCNC: 33.2 G/DL (ref 31.5–36.5)
MCV RBC AUTO: 93 FL (ref 78–100)
MONOCYTES # BLD AUTO: 0.6 10E3/UL (ref 0–1.3)
MONOCYTES NFR BLD AUTO: 9 %
NEUTROPHILS # BLD AUTO: 3.3 10E3/UL (ref 1.6–8.3)
NEUTROPHILS NFR BLD AUTO: 46 %
NRBC # BLD AUTO: 0 10E3/UL
NRBC BLD AUTO-RTO: 0 /100
PLATELET # BLD AUTO: 330 10E3/UL (ref 150–450)
POTASSIUM SERPL-SCNC: 4.1 MMOL/L (ref 3.4–5.3)
RBC # BLD AUTO: 3.98 10E6/UL (ref 3.8–5.2)
SODIUM SERPL-SCNC: 134 MMOL/L (ref 136–145)
WBC # BLD AUTO: 7 10E3/UL (ref 4–11)

## 2023-03-15 PROCEDURE — 258N000003 HC RX IP 258 OP 636: Performed by: EMERGENCY MEDICINE

## 2023-03-15 PROCEDURE — 10060 I&D ABSCESS SIMPLE/SINGLE: CPT | Performed by: SURGERY

## 2023-03-15 PROCEDURE — 258N000003 HC RX IP 258 OP 636: Performed by: ANESTHESIOLOGY

## 2023-03-15 PROCEDURE — 250N000011 HC RX IP 250 OP 636: Performed by: EMERGENCY MEDICINE

## 2023-03-15 PROCEDURE — 88305 TISSUE EXAM BY PATHOLOGIST: CPT | Mod: TC | Performed by: SURGERY

## 2023-03-15 PROCEDURE — 99235 HOSP IP/OBS SAME DATE MOD 70: CPT | Performed by: HOSPITALIST

## 2023-03-15 PROCEDURE — 84520 ASSAY OF UREA NITROGEN: CPT | Performed by: EMERGENCY MEDICINE

## 2023-03-15 PROCEDURE — 99221 1ST HOSP IP/OBS SF/LOW 40: CPT | Mod: 25 | Performed by: SURGERY

## 2023-03-15 PROCEDURE — G0378 HOSPITAL OBSERVATION PER HR: HCPCS

## 2023-03-15 PROCEDURE — 272N000001 HC OR GENERAL SUPPLY STERILE: Performed by: SURGERY

## 2023-03-15 PROCEDURE — 96365 THER/PROPH/DIAG IV INF INIT: CPT

## 2023-03-15 PROCEDURE — 999N000141 HC STATISTIC PRE-PROCEDURE NURSING ASSESSMENT: Performed by: SURGERY

## 2023-03-15 PROCEDURE — 250N000011 HC RX IP 250 OP 636: Performed by: NURSE ANESTHETIST, CERTIFIED REGISTERED

## 2023-03-15 PROCEDURE — 87040 BLOOD CULTURE FOR BACTERIA: CPT | Performed by: EMERGENCY MEDICINE

## 2023-03-15 PROCEDURE — 250N000011 HC RX IP 250 OP 636: Performed by: SURGERY

## 2023-03-15 PROCEDURE — 84703 CHORIONIC GONADOTROPIN ASSAY: CPT | Performed by: EMERGENCY MEDICINE

## 2023-03-15 PROCEDURE — 710N000009 HC RECOVERY PHASE 1, LEVEL 1, PER MIN: Performed by: SURGERY

## 2023-03-15 PROCEDURE — 250N000009 HC RX 250: Performed by: NURSE ANESTHETIST, CERTIFIED REGISTERED

## 2023-03-15 PROCEDURE — 250N000013 HC RX MED GY IP 250 OP 250 PS 637: Performed by: SURGERY

## 2023-03-15 PROCEDURE — 99285 EMERGENCY DEPT VISIT HI MDM: CPT | Mod: 25

## 2023-03-15 PROCEDURE — 250N000009 HC RX 250: Performed by: SURGERY

## 2023-03-15 PROCEDURE — 88305 TISSUE EXAM BY PATHOLOGIST: CPT | Mod: 26 | Performed by: PATHOLOGY

## 2023-03-15 PROCEDURE — 87075 CULTR BACTERIA EXCEPT BLOOD: CPT | Performed by: SURGERY

## 2023-03-15 PROCEDURE — 360N000075 HC SURGERY LEVEL 2, PER MIN: Performed by: SURGERY

## 2023-03-15 PROCEDURE — 87205 SMEAR GRAM STAIN: CPT | Performed by: SURGERY

## 2023-03-15 PROCEDURE — 87070 CULTURE OTHR SPECIMN AEROBIC: CPT | Mod: XS | Performed by: SURGERY

## 2023-03-15 PROCEDURE — 250N000025 HC SEVOFLURANE, PER MIN: Performed by: SURGERY

## 2023-03-15 PROCEDURE — 76642 ULTRASOUND BREAST LIMITED: CPT | Mod: LT

## 2023-03-15 PROCEDURE — 85025 COMPLETE CBC W/AUTO DIFF WBC: CPT | Performed by: EMERGENCY MEDICINE

## 2023-03-15 PROCEDURE — 258N000003 HC RX IP 258 OP 636: Performed by: NURSE ANESTHETIST, CERTIFIED REGISTERED

## 2023-03-15 PROCEDURE — 710N000012 HC RECOVERY PHASE 2, PER MINUTE: Performed by: SURGERY

## 2023-03-15 PROCEDURE — 36415 COLL VENOUS BLD VENIPUNCTURE: CPT | Performed by: EMERGENCY MEDICINE

## 2023-03-15 PROCEDURE — 370N000017 HC ANESTHESIA TECHNICAL FEE, PER MIN: Performed by: SURGERY

## 2023-03-15 PROCEDURE — 96375 TX/PRO/DX INJ NEW DRUG ADDON: CPT | Mod: 59

## 2023-03-15 RX ORDER — LABETALOL HYDROCHLORIDE 5 MG/ML
10 INJECTION, SOLUTION INTRAVENOUS
Status: DISCONTINUED | OUTPATIENT
Start: 2023-03-15 | End: 2023-03-15 | Stop reason: HOSPADM

## 2023-03-15 RX ORDER — OXYCODONE HYDROCHLORIDE 5 MG/1
5-10 TABLET ORAL EVERY 4 HOURS PRN
Qty: 10 TABLET | Refills: 0 | Status: SHIPPED | OUTPATIENT
Start: 2023-03-15 | End: 2023-03-21

## 2023-03-15 RX ORDER — ACETAMINOPHEN 325 MG/1
650 TABLET ORAL
Status: DISCONTINUED | OUTPATIENT
Start: 2023-03-15 | End: 2023-03-15 | Stop reason: HOSPADM

## 2023-03-15 RX ORDER — ONDANSETRON 4 MG/1
4 TABLET, ORALLY DISINTEGRATING ORAL EVERY 30 MIN PRN
Status: DISCONTINUED | OUTPATIENT
Start: 2023-03-15 | End: 2023-03-15 | Stop reason: HOSPADM

## 2023-03-15 RX ORDER — LIDOCAINE 40 MG/G
CREAM TOPICAL
Status: DISCONTINUED | OUTPATIENT
Start: 2023-03-15 | End: 2023-03-15 | Stop reason: HOSPADM

## 2023-03-15 RX ORDER — FENTANYL CITRATE 50 UG/ML
50 INJECTION, SOLUTION INTRAMUSCULAR; INTRAVENOUS EVERY 5 MIN PRN
Status: DISCONTINUED | OUTPATIENT
Start: 2023-03-15 | End: 2023-03-15 | Stop reason: HOSPADM

## 2023-03-15 RX ORDER — SODIUM CHLORIDE, SODIUM LACTATE, POTASSIUM CHLORIDE, CALCIUM CHLORIDE 600; 310; 30; 20 MG/100ML; MG/100ML; MG/100ML; MG/100ML
INJECTION, SOLUTION INTRAVENOUS CONTINUOUS
Status: DISCONTINUED | OUTPATIENT
Start: 2023-03-15 | End: 2023-03-15 | Stop reason: HOSPADM

## 2023-03-15 RX ORDER — IBUPROFEN 600 MG/1
600 TABLET, FILM COATED ORAL EVERY 6 HOURS PRN
Qty: 30 TABLET | Refills: 0 | Status: SHIPPED | OUTPATIENT
Start: 2023-03-15 | End: 2023-03-21

## 2023-03-15 RX ORDER — MAGNESIUM SULFATE HEPTAHYDRATE 40 MG/ML
2 INJECTION, SOLUTION INTRAVENOUS
Status: DISCONTINUED | OUTPATIENT
Start: 2023-03-15 | End: 2023-03-15 | Stop reason: HOSPADM

## 2023-03-15 RX ORDER — HYDROMORPHONE HYDROCHLORIDE 1 MG/ML
0.5 INJECTION, SOLUTION INTRAMUSCULAR; INTRAVENOUS; SUBCUTANEOUS EVERY 5 MIN PRN
Status: DISCONTINUED | OUTPATIENT
Start: 2023-03-15 | End: 2023-03-15 | Stop reason: HOSPADM

## 2023-03-15 RX ORDER — AMOXICILLIN 250 MG
1-2 CAPSULE ORAL 2 TIMES DAILY PRN
Qty: 30 TABLET | Refills: 0 | Status: SHIPPED | OUTPATIENT
Start: 2023-03-15 | End: 2023-03-21

## 2023-03-15 RX ORDER — ALBUTEROL SULFATE 0.83 MG/ML
2.5 SOLUTION RESPIRATORY (INHALATION) EVERY 4 HOURS PRN
Status: DISCONTINUED | OUTPATIENT
Start: 2023-03-15 | End: 2023-03-15 | Stop reason: HOSPADM

## 2023-03-15 RX ORDER — HYDROMORPHONE HYDROCHLORIDE 1 MG/ML
0.5 INJECTION, SOLUTION INTRAMUSCULAR; INTRAVENOUS; SUBCUTANEOUS ONCE
Status: COMPLETED | OUTPATIENT
Start: 2023-03-15 | End: 2023-03-15

## 2023-03-15 RX ORDER — ONDANSETRON 2 MG/ML
INJECTION INTRAMUSCULAR; INTRAVENOUS PRN
Status: DISCONTINUED | OUTPATIENT
Start: 2023-03-15 | End: 2023-03-15

## 2023-03-15 RX ORDER — CEPHALEXIN 500 MG/1
500 CAPSULE ORAL 2 TIMES DAILY
Qty: 10 CAPSULE | Refills: 0 | Status: SHIPPED | OUTPATIENT
Start: 2023-03-15 | End: 2023-03-21

## 2023-03-15 RX ORDER — KETOROLAC TROMETHAMINE 15 MG/ML
15 INJECTION, SOLUTION INTRAMUSCULAR; INTRAVENOUS
Status: DISCONTINUED | OUTPATIENT
Start: 2023-03-15 | End: 2023-03-15 | Stop reason: HOSPADM

## 2023-03-15 RX ORDER — FENTANYL CITRATE 50 UG/ML
25 INJECTION, SOLUTION INTRAMUSCULAR; INTRAVENOUS EVERY 5 MIN PRN
Status: DISCONTINUED | OUTPATIENT
Start: 2023-03-15 | End: 2023-03-15 | Stop reason: HOSPADM

## 2023-03-15 RX ORDER — PROPOFOL 10 MG/ML
INJECTION, EMULSION INTRAVENOUS PRN
Status: DISCONTINUED | OUTPATIENT
Start: 2023-03-15 | End: 2023-03-15

## 2023-03-15 RX ORDER — NEOSTIGMINE METHYLSULFATE 1 MG/ML
VIAL (ML) INJECTION PRN
Status: DISCONTINUED | OUTPATIENT
Start: 2023-03-15 | End: 2023-03-15

## 2023-03-15 RX ORDER — ONDANSETRON 2 MG/ML
4 INJECTION INTRAMUSCULAR; INTRAVENOUS EVERY 30 MIN PRN
Status: DISCONTINUED | OUTPATIENT
Start: 2023-03-15 | End: 2023-03-15 | Stop reason: HOSPADM

## 2023-03-15 RX ORDER — HYDRALAZINE HYDROCHLORIDE 20 MG/ML
2.5-5 INJECTION INTRAMUSCULAR; INTRAVENOUS EVERY 10 MIN PRN
Status: DISCONTINUED | OUTPATIENT
Start: 2023-03-15 | End: 2023-03-15 | Stop reason: HOSPADM

## 2023-03-15 RX ORDER — OXYCODONE HYDROCHLORIDE 5 MG/1
5 TABLET ORAL
Status: COMPLETED | OUTPATIENT
Start: 2023-03-15 | End: 2023-03-15

## 2023-03-15 RX ORDER — LIDOCAINE HYDROCHLORIDE 20 MG/ML
INJECTION, SOLUTION INFILTRATION; PERINEURAL PRN
Status: DISCONTINUED | OUTPATIENT
Start: 2023-03-15 | End: 2023-03-15

## 2023-03-15 RX ORDER — HYDROMORPHONE HCL IN WATER/PF 6 MG/30 ML
0.2 PATIENT CONTROLLED ANALGESIA SYRINGE INTRAVENOUS EVERY 5 MIN PRN
Status: DISCONTINUED | OUTPATIENT
Start: 2023-03-15 | End: 2023-03-15 | Stop reason: HOSPADM

## 2023-03-15 RX ORDER — DEXAMETHASONE SODIUM PHOSPHATE 4 MG/ML
INJECTION, SOLUTION INTRA-ARTICULAR; INTRALESIONAL; INTRAMUSCULAR; INTRAVENOUS; SOFT TISSUE PRN
Status: DISCONTINUED | OUTPATIENT
Start: 2023-03-15 | End: 2023-03-15

## 2023-03-15 RX ORDER — GLYCOPYRROLATE 0.2 MG/ML
INJECTION, SOLUTION INTRAMUSCULAR; INTRAVENOUS PRN
Status: DISCONTINUED | OUTPATIENT
Start: 2023-03-15 | End: 2023-03-15

## 2023-03-15 RX ORDER — MAGNESIUM HYDROXIDE 1200 MG/15ML
LIQUID ORAL PRN
Status: DISCONTINUED | OUTPATIENT
Start: 2023-03-15 | End: 2023-03-15 | Stop reason: HOSPADM

## 2023-03-15 RX ORDER — BUPIVACAINE HYDROCHLORIDE 2.5 MG/ML
INJECTION, SOLUTION EPIDURAL; INFILTRATION; INTRACAUDAL PRN
Status: DISCONTINUED | OUTPATIENT
Start: 2023-03-15 | End: 2023-03-15 | Stop reason: HOSPADM

## 2023-03-15 RX ORDER — SODIUM CHLORIDE 9 MG/ML
INJECTION, SOLUTION INTRAVENOUS CONTINUOUS
Status: DISCONTINUED | OUTPATIENT
Start: 2023-03-15 | End: 2023-03-15 | Stop reason: HOSPADM

## 2023-03-15 RX ORDER — FENTANYL CITRATE 50 UG/ML
INJECTION, SOLUTION INTRAMUSCULAR; INTRAVENOUS PRN
Status: DISCONTINUED | OUTPATIENT
Start: 2023-03-15 | End: 2023-03-15

## 2023-03-15 RX ADMIN — HYDROMORPHONE HYDROCHLORIDE 0.5 MG: 1 INJECTION, SOLUTION INTRAMUSCULAR; INTRAVENOUS; SUBCUTANEOUS at 04:15

## 2023-03-15 RX ADMIN — SODIUM CHLORIDE, POTASSIUM CHLORIDE, SODIUM LACTATE AND CALCIUM CHLORIDE: 600; 310; 30; 20 INJECTION, SOLUTION INTRAVENOUS at 09:53

## 2023-03-15 RX ADMIN — DEXAMETHASONE SODIUM PHOSPHATE 8 MG: 4 INJECTION, SOLUTION INTRA-ARTICULAR; INTRALESIONAL; INTRAMUSCULAR; INTRAVENOUS; SOFT TISSUE at 10:35

## 2023-03-15 RX ADMIN — ONDANSETRON 4 MG: 2 INJECTION INTRAMUSCULAR; INTRAVENOUS at 10:40

## 2023-03-15 RX ADMIN — PHENYLEPHRINE HYDROCHLORIDE 100 MCG: 10 INJECTION INTRAVENOUS at 10:46

## 2023-03-15 RX ADMIN — LIDOCAINE HYDROCHLORIDE 50 MG: 20 INJECTION, SOLUTION INFILTRATION; PERINEURAL at 10:35

## 2023-03-15 RX ADMIN — TAZOBACTAM SODIUM AND PIPERACILLIN SODIUM 4.5 G: 500; 4 INJECTION, SOLUTION INTRAVENOUS at 04:16

## 2023-03-15 RX ADMIN — PROPOFOL 200 MG: 10 INJECTION, EMULSION INTRAVENOUS at 10:35

## 2023-03-15 RX ADMIN — VANCOMYCIN HYDROCHLORIDE 1750 MG: 5 INJECTION, POWDER, LYOPHILIZED, FOR SOLUTION INTRAVENOUS at 06:32

## 2023-03-15 RX ADMIN — PHENYLEPHRINE HYDROCHLORIDE 100 MCG: 10 INJECTION INTRAVENOUS at 10:51

## 2023-03-15 RX ADMIN — MIDAZOLAM 2 MG: 1 INJECTION INTRAMUSCULAR; INTRAVENOUS at 10:29

## 2023-03-15 RX ADMIN — SODIUM CHLORIDE, POTASSIUM CHLORIDE, SODIUM LACTATE AND CALCIUM CHLORIDE: 600; 310; 30; 20 INJECTION, SOLUTION INTRAVENOUS at 10:29

## 2023-03-15 RX ADMIN — PHENYLEPHRINE HYDROCHLORIDE 100 MCG: 10 INJECTION INTRAVENOUS at 10:40

## 2023-03-15 RX ADMIN — OXYCODONE HYDROCHLORIDE 5 MG: 5 TABLET ORAL at 13:12

## 2023-03-15 RX ADMIN — ROCURONIUM BROMIDE 10 MG: 50 INJECTION, SOLUTION INTRAVENOUS at 10:39

## 2023-03-15 RX ADMIN — GLYCOPYRROLATE 0.2 MG: 0.2 INJECTION, SOLUTION INTRAMUSCULAR; INTRAVENOUS at 11:04

## 2023-03-15 RX ADMIN — NEOSTIGMINE METHYLSULFATE 2 MG: 1 INJECTION, SOLUTION INTRAVENOUS at 11:04

## 2023-03-15 RX ADMIN — FENTANYL CITRATE 100 MCG: 50 INJECTION, SOLUTION INTRAMUSCULAR; INTRAVENOUS at 10:35

## 2023-03-15 ASSESSMENT — ACTIVITIES OF DAILY LIVING (ADL)
ADLS_ACUITY_SCORE: 37
ADLS_ACUITY_SCORE: 35
ADLS_ACUITY_SCORE: 20
ADLS_ACUITY_SCORE: 20
ADLS_ACUITY_SCORE: 37
ADLS_ACUITY_SCORE: 37

## 2023-03-15 ASSESSMENT — ENCOUNTER SYMPTOMS
FEVER: 0
NAUSEA: 1
VOMITING: 0

## 2023-03-15 NOTE — DISCHARGE SUMMARY
Pt was admitted earlier this morning with breast abscess and cellulitis. General surgery was consulted and took pt to OR for I&D. Surgery does not feel like patient needs to remain in the hospital for further IV antibiotics so was discharged home from the PACU. Antibiotics per surgery recommendations.     Leyda Hollis PA-C

## 2023-03-15 NOTE — PLAN OF CARE
ROOM # 202-1    Living Situation (if not independent, order SW consult): home with spouse   Facility name:  : [facility]- spouse     Activity level at baseline: Ind  Activity level on admit: Ind     Who will be transporting you at discharge: spouse     Patient registered to observation; given Patient Bill of Rights; given the opportunity to ask questions about observation status and their plan of care.  Patient has been oriented to the observation room, bathroom and call light is in place.    Discussed discharge goals and expectations with patient/family.         :                         Normal vision: sees adequately in most situations; can see medication labels, newsprint

## 2023-03-15 NOTE — OP NOTE
General Surgery Operative Note      Pre-operative diagnosis: Left Breast abscess [N61.1]   Post-operative diagnosis: Left Breast abscess [N61.1]   Procedure: Excisional Debridement left breast abscess    Surgeon: Re Ramos MD   Assistant(s): Dinorah Brown PA-C   Anesthesia: General    Estimated blood loss:   1 cc     Specimens: ID Type Source Tests Collected by Time Destination   1 : LEFT BREAST TISSUE Tissue Breast, Left SURGICAL PATHOLOGY EXAM Re Ramos MD 3/15/2023 10:59 AM    A : Left breast abcess fluid Abscess Breast, Left ANAEROBIC BACTERIAL CULTURE ROUTINE, GRAM STAIN, AEROBIC BACTERIAL CULTURE ROUTINE Re Ramos MD 3/15/2023 10:56 AM    B : LEFT BREAST TISSUE Abscess Breast, Left ANAEROBIC BACTERIAL CULTURE ROUTINE, GRAM STAIN, AEROBIC BACTERIAL CULTURE ROUTINE Re Ramos MD 3/15/2023 10:57 AM           Indication:  Stacia is a 27 year old female with a nonlactational left breast abscess at 2:00. There is skin thinning and complex appearing fluid collections on ultrasound. Attempted aspiration in the ER last week without success.  No improvement with antibiotic therapy  History of prior I&D 11/2022 to the left breast, similar location.  I am recommending incision/drainage to treat the abscess and biopsy of surrounding breast tissue to rule out malignancy and granulomatous mastitis.     Findings:  Simple red tinged superficial fluid pocket, minimal necrotic tissue debrided, thickened breast tissue deep to cavity biopsied and penrose drain placed. Cultures sent.    Details of procedure:  The patient was taken to the operating room and placed on the table in supine position.  General anesthetic was administered.  The skin was prepped and draped in sterile fashion.  A straight incision measuring 2 cm was made over the area of fluctuance. Immediately we encountered thin red tinged fluid and a sample was sent for culture.  There was necrotic tissue deep to this and some of this  was bluntly removed with gauze and then the rest of this was debrided sharply with Bovie electrocautery back to normal healthy skin and subcutaneous tissue. A 2cm area of breast tissue deep to the cavity was excised and sent for pathology routine. Hemostasis was maintained throughout.  The wound at the conclusion measured 2cm x1cmx 2cm deep. There was tissue undermining circumferentially for approximately 4cm.The wound was irrigated. 1 1/4inch penrose drain was placed into the cavity and sutured in place with 2-0 nylon. Additional 2-0 nylon was used to close the skin opening around the penrose.  A sterile dressing was applied.  The patient was transferred to the recovery room in good condition.  All sponge and needle counts were correct.    Re Ramos MD

## 2023-03-15 NOTE — DISCHARGE INSTRUCTIONS
HOME CARE FOLLOWING DEBRIDEMENT  KASANDRA Angeles, LEIGHTON Aguilera C. Pratt, J. Shaheen    WOUND CARE:  The white drain will be removed next week at your clinic appointment  Cover the site for any drainage with gauze and change the gauze as often as needed     ACTIVITY:  As tolerated    DIET:  No restrictions.  Increased fluid intake is recommended. While taking pain medications, increase dietary fiber or add a fiber supplementation like Metamucil or Citrucel to help prevent constipation - a possible side effect of pain medications.    DISCOMFORT:  Recommend the following over the counter medications:  - Ibuprofen (motrin) 600mg every 6 hours (max 2,400mg per day)   - Tylenol (acetaminophen) 500-1000mg every 6 hours (max 4,000mg per day)  - Take with food if GI upset occurs  - If additional pain medication is needed, take the narcotic that you were prescribed.    RETURN APPOINTMENT:  Return to clinic for drain removal on 3/21/23 at 10:30am with Dr Ramos  [facility]  [address]  [place], [place]  [phone]       CONTACT US IF THE FOLLOWING DEVELOPS:   1. A fever that is above 101     2. If there is a large amount of drainage, bleeding, or swelling.   3. Severe pain that is not relieved by your prescription.   4. Drainage that is thick, cloudy, yellow, green or white.   5. Any other questions not answered by  Frequently Asked Questions  sheet.        FREQUENTLY ASKED QUESTIONS:    Q:  How should my incision look?    A:  Normally your incision will appear slightly swollen with light redness directly along the incision itself as it heals.  It may feel like a bump or ridge as the healing/scarring happens, and over time (3-4 months) this bump or ridge feeling should slowly go away.  In general, clear or pink watery drainage can be normal at first as your incision heals, but should decrease over time.    Q:  How do I know if my incision is infected?  A:  Look at your incision  for signs of infection, like redness around the incision spreading to surrounding skin, or drainage of cloudy or foul-smelling drainage.  If you feel warm, check your temperature to see if you are running a fever.    **If any of these things occur, please notify the nurse at our office.  We may need you to come into the office for an incision check.      Q:  How do I take care of my incision?  A:  If you have a dressing in place - Starting the day after surgery, replace the dressing 1-2 times a day until there is no further drainage from the incision.  At that time, a dressing is no longer needed.  Try to minimize tape on the skin if irritation is occurring at the tape sites.  If you have significant irritation from tape on the skin, please call the office to discuss other method of dressing your incision.    Small pieces of tape called  steri-strips  may be present directly overlying your incision; these may be removed 10 days after surgery unless otherwise specified by your surgeon.  If these tapes start to loosen at the ends, you may trim them back until they fall off or are removed.    A:  If you had  Dermabond  tissue glue used as a dressing (this causes your incision to look shiny with a clear covering over it) - This type of dressing wears off with time and does not require more dressings over the top unless it is draining around the glue as it wears off.  Do not apply ointments or lotions over the incisions until the glue has completely worn off.    Q:  There is a piece of tape or a sticky  lead  still on my skin.  Can I remove this?  A:  Sometimes the sticky  leads  used for monitoring during surgery or for evaluation in the emergency department are not all removed while you are in the hospital.  These sometimes have a tab or metal dot on them.  You can easily remove these on your own, like taking off a band-aid.  If there is a gel substance under the  lead , simply wipe/clean it off with a washcloth or  paper towel.      Q:  What can I do to minimize constipation (very hard stools, or lack of stools)?  A:  Stay well hydrated.  Increase your dietary fiber intake or take a fiber supplement -with plenty of water.  Walk around frequently.  You may consider an over-the-counter stool-softener.  Your Pharmacist can assist you with choosing one that is stocked at your pharmacy.  Constipation is also one of the most common side effects of pain medication.  If you are using pain medication, be pro-active and try to PREVENT problems with constipation by taking the steps above BEFORE constipation becomes a problem.    Q:  What do I do if I need more pain medications?  A:  Call the office to receive refills.  Be aware that certain pain meds cannot be called into a pharmacy and actually require a paper prescription.  A change may be made in your pain med as you progress thru your recovery period or if you have side effects to certain meds.    --Pain meds are NOT refilled after 5pm on weekdays, and NOT AT ALL on the weekends, so please look ahead to prevent problems.      Q:  Why am I having a hard time sleeping now that I am at home?  A:  Many medications you receive while you are in the hospital can impact your sleep for a number of days after your surgery/hospitalization.  Decreased level of activity and naps during the day may also make sleeping at night difficult.  Try to minimize day-time naps, and get up frequently during the day to walk around your home during your recovery time.  Sleep aides may be of some help, but are not recommended for long-term use.      Q:  I am having some back discomfort.  What should I do?  A:  This may be related to certain positioning that was required for your surgery, extended periods of time in bed, or other changes in your overall activity level.  You may try ice, heat, acetaminophen, or ibuprofen to treat this temporarily.  Note that many pain medications have acetaminophen in them and  would state this on the prescription bottle.  Be sure not to exceed the maximum of 4000mg per day of acetaminophen.     **If the pain you are having does not resolve, is severe, or is a flare of back pain you have had on other occasions prior to surgery, please contact your primary physician for further recommendations or for an appointment to be examined at their office.    Q:  Why am I having headaches?  A:  Headaches can be caused by many things:  caffeine withdrawal, use of pain meds, dehydration, high blood pressure, lack of sleep, over-activity/exhaustion, flare-up of usual migraine headaches.  If you feel this is related to muscle tension (a band-like feeling around the head, or a pressure at the low-back of the head) you may try ice or heat to this area.  You may need to drink more fluids (try electrolyte drink like Gatorade), rest, or take your usual migraine medications.   **If your headaches do not resolve, worsen, are accompanied by other symptoms, or if your blood pressure is high, please call your primary physician for recommendation and/or examination.    Q:  I am unable to urinate.  What do I do?  A:  A small percentage of people can have difficulty urinating initially after surgery.  This includes being able to urinate only a very small amount at a time and feeling discomfort or pressure in the very low abdomen.  This is called  urinary retention , and is actually an urgent situation.  Proceed to your nearest Emergency department for evaluation (not an Urgent Care Center).  Sometimes the bladder does not work correctly after certain medications you receive during surgery, or related to certain procedures.  You may need to have a catheter placed until your bladder recovers.  When planning to go to an Emergency department, it may help to call the ER to let them know you are coming in for this problem after a surgery.  This may help you get in quicker to be evaluated.  **If you have symptoms of a  urinary tract infection, please contact your primary physician for the proper evaluation and treatment.          If you have other questions, please call the office Monday thru Friday between 8am and 4:30pm to discuss with the nurse or physician assistant.  #[phone]    There is a surgeon ON CALL on weekday evenings and over the weekend in case of urgent need only, and may be contacted at the same number.    If you are having an emergency, call 911 or proceed to your nearest emergency department.     GENERAL ANESTHESIA OR SEDATION ADULT DISCHARGE INSTRUCTIONS   SPECIAL PRECAUTIONS FOR 24 HOURS AFTER SURGERY    IT IS NOT UNUSUAL TO FEEL LIGHT-HEADED OR FAINT, UP TO 24 HOURS AFTER SURGERY OR WHILE TAKING PAIN MEDICATION.  IF YOU HAVE THESE SYMPTOMS; SIT FOR A FEW MINUTES BEFORE STANDING AND HAVE SOMEONE ASSIST YOU WHEN YOU GET UP TO WALK OR USE THE BATHROOM.    YOU SHOULD REST AND RELAX FOR THE NEXT 24 HOURS AND YOU MUST MAKE ARRANGEMENTS TO HAVE SOMEONE STAY WITH YOU FOR AT LEAST 24 HOURS AFTER YOUR DISCHARGE.  AVOID HAZARDOUS AND STRENUOUS ACTIVITIES.  DO NOT MAKE IMPORTANT DECISIONS FOR 24 HOURS.    DO NOT DRIVE ANY VEHICLE OR OPERATE MECHANICAL EQUIPMENT FOR 24 HOURS FOLLOWING THE END OF YOUR SURGERY.  EVEN THOUGH YOU MAY FEEL NORMAL, YOUR REACTIONS MAY BE AFFECTED BY THE MEDICATION YOU HAVE RECEIVED.    DO NOT DRINK ALCOHOLIC BEVERAGES FOR 24 HOURS FOLLOWING YOUR SURGERY.    DRINK CLEAR LIQUIDS (APPLE JUICE, GINGER ALE, 7-UP, BROTH, ETC.).  PROGRESS TO YOUR REGULAR DIET AS YOU FEEL ABLE.    YOU MAY HAVE A DRY MOUTH, A SORE THROAT, MUSCLES ACHES OR TROUBLE SLEEPING.  THESE SHOULD GO AWAY AFTER 24 HOURS.    CALL YOUR DOCTOR FOR ANY OF THE FOLLOWING:  SIGNS OF INFECTION (FEVER, GROWING TENDERNESS AT THE SURGERY SITE, A LARGE AMOUNT OF DRAINAGE OR BLEEDING, SEVERE PAIN, FOUL-SMELLING DRAINAGE, REDNESS OR SWELLING.    IT HAS BEEN OVER 8 TO 10 HOURS SINCE SURGERY AND YOU ARE STILL NOT ABLE TO URINATE (PASS  WATER).     Maximum acetaminophen (Tylenol) dose from all sources should not exceed 4 grams (4000 mg) per day.

## 2023-03-15 NOTE — ANESTHESIA PREPROCEDURE EVALUATION
Anesthesia Pre-Procedure Evaluation    Patient: Stacia Hannon   MRN: [id number] : 1995        Procedure : Procedure(s):  INCISION AND DRAINAGE, BREAST          Past Medical History:   Diagnosis Date     Anemia 2014     Pancreatitis 2014      Past Surgical History:   Procedure Laterality Date     DILATION AND CURETTAGE SUCTION WITH ULTRASOUND GUIDANCE N/A 2023    Procedure: Ultrasound Guided Suction Dilation and Curettage;  Surgeon: Mxa Martinez MD;  Location: [facility] OR     LAPAROSCOPIC CHOLECYSTECTOMY WITH CHOLANGIOGRAMS N/A 2014    Procedure: LAPAROSCOPIC CHOLECYSTECTOMY WITH CHOLANGIOGRAMS;  Surgeon: Sheldon Watts MD;  Location: [facility] OR      Allergies   Allergen Reactions     Doxycycline Other (See Comments)     Diarrhea      Social History     Tobacco Use     Smoking status: Never     Smokeless tobacco: Never   Substance Use Topics     Alcohol use: No      Wt Readings from Last 1 Encounters:   03/15/23 85.8 kg (189 lb 1.6 oz)        Anesthesia Evaluation   Pt has had prior anesthetic.         ROS/MED HX  ENT/Pulmonary:  - neg pulmonary ROS     Neurologic:  - neg neurologic ROS     Cardiovascular:  - neg cardiovascular ROS     METS/Exercise Tolerance:     Hematologic:     (+) anemia,     Musculoskeletal:  - neg musculoskeletal ROS     GI/Hepatic:  - neg GI/hepatic ROS     Renal/Genitourinary:  - neg Renal ROS     Endo:  - neg endo ROS     Psychiatric/Substance Use:  - neg psychiatric ROS     Infectious Disease:  - neg infectious disease ROS     Malignancy:       Other:            Physical Exam    Airway        Mallampati: II   TM distance: > 3 FB   Neck ROM: full   Mouth opening: > 3 cm    Respiratory Devices and Support         Dental       (+) Minor Abnormalities - some fillings, tiny chips      Cardiovascular          Rhythm and rate: regular and normal     Pulmonary           breath sounds clear to auscultation           OUTSIDE LABS:  CBC:   Lab Results   Component Value Date     WBC 7.0 03/15/2023    WBC 10.2 09/01/2022    HGB 12.2 03/15/2023    HGB 12.7 09/01/2022    HCT 36.8 03/15/2023    HCT 39.8 09/01/2022     03/15/2023     09/01/2022     BMP:   Lab Results   Component Value Date     (L) 03/15/2023     09/01/2022    POTASSIUM 4.1 03/15/2023    POTASSIUM 4.6 09/01/2022    CHLORIDE 100 03/15/2023    CHLORIDE 100 09/01/2022    CO2 21 (L) 03/15/2023    CO2 27 09/01/2022    BUN 12.2 03/15/2023    BUN 8.1 09/01/2022    CR 0.58 03/15/2023    CR 0.47 (L) 09/01/2022     (H) 03/15/2023     (H) 09/01/2022     COAGS:   Lab Results   Component Value Date    INR 1.16 (H) 09/24/2014     POC:   Lab Results   Component Value Date     (H) 07/26/2018    HCG Negative 08/07/2019    HCGS Negative 03/15/2023     HEPATIC:   Lab Results   Component Value Date    ALBUMIN 3.5 11/01/2020    PROTTOTAL 8.4 11/01/2020    ALT 29 11/01/2020    AST 18 11/01/2020    ALKPHOS 63 11/01/2020    BILITOTAL 0.2 11/01/2020     OTHER:   Lab Results   Component Value Date    LACT 0.9 03/12/2017    A1C 5.3 01/27/2012    CAROL 8.8 03/15/2023    PHOS 4.1 05/17/2014    MAG 1.8 05/17/2014    LIPASE 67 (L) 11/01/2020    AMYLASE 49 06/08/2015    TSH 1.58 07/26/2018    CRP <5.0 05/01/2013    SED 26 (H) 05/01/2013       Anesthesia Plan    ASA Status:  2   NPO Status:  NPO Appropriate    Anesthesia Type: General.     - Airway: LMA   Induction: Intravenous.   Maintenance: Balanced.        Consents    Anesthesia Plan(s) and associated risks, benefits, and realistic alternatives discussed. Questions answered and patient/representative(s) expressed understanding.    - Discussed:     - Discussed with:  Patient      - Extended Intubation/Ventilatory Support Discussed: No.      - Patient is DNR/DNI Status: No    Use of blood products discussed: No .     Postoperative Care    Pain management: IV analgesics, Oral pain medications, Multi-modal analgesia.   PONV prophylaxis: Ondansetron (or other  5HT-3), Dexamethasone or Solumedrol     Comments:                Jonathan Hurst MD

## 2023-03-15 NOTE — PHARMACY-ADMISSION MEDICATION HISTORY
Admission medication history interview status for this patient is complete. See [facility] admission navigator for allergy information, prior to admission medications and immunization status.     Medication history interview done, indicate source(s): Patient  Medication history resources (including written lists, pill bottles, clinic record):Pending sale to [facility]  Pharmacy: [facility] [place]    Changes made to PTA medication list:  Added: None  Changed: None  Reported as Not Taking: None  Removed: cephalexin, bactrim    Actions taken by pharmacist (provider contacted, etc):None     Additional medication history information:None    Medication reconciliation/reorder completed by provider prior to medication history?  N   (Y/N)     Medication Affordability:  Not including over the counter (OTC) medications, was there a time in the past 12 months when you did not take your medications as prescribed because of cost?: No     Prior to Admission medications    Medication Sig Last Dose Taking? Auth Provider Long Term End Date   calcium-vitamin D 500-125 MG-UNIT TABS Take 1 tablet by mouth 2 times daily Past Week at am Yes Reported, Patient

## 2023-03-15 NOTE — CONSULTS
[facility]  General Surgery Consultation           Stacia Hannon MRN# [id number]   YOB: 1995 Age: 27 year old      Date of Admission:  3/15/2023  Date of Consult: 3/15/2023     Reason for consult:            Left breast abscess     Requesting physician:            Lary Ruby MD         Assessment:    Stacia is a 27 year old female with a nonlactational left breast abscess at 2:00. There is skin thinning and complex appearing fluid collections on ultrasound. Attempted aspiration in the ER last week without success.  No improvement with antibiotic therapy  History of prior I&D 11/2022 to the left breast, similar location.    PLAN:  I am recommending incision/drainage to treat the abscess and biopsy of surrounding breast tissue to rule out malignancy and granulomatous mastitis.    We have discussed the indication, alternatives, risks and expected recovery.  Specifically, incisions, scarring, recurrent infection, anesthesia.  We have discussed the recommended interventions and treatments for these complications.    She understand the wound will be left open to heal by secondary intent.  Dressing changes may be needed.    All questions have been answered to the best of my ability.    She elects to proceed with incision/drainage today      HPI:  Stacia Hannon is a 27 year old female with a left breast painful mass.  She started having issues with this around September 2022.  She was seen in the ER 9/1/2022 with left breast cellulitis.  At that time ultrasound showed no abscess or fluid collection.  She was prescribed a course of Keflex.  That seemed to improve things however she presented back to the ER on 11/12/2022 with similar symptoms.  At that time ultrasound showed cobblestoning no discrete abscess in the left breast.  On 11/23/2022 she was taken for incision and drainage at [facility], [place], operated by [facility] and the wound was packed twice a day until healed.  There was no growth from the cultures.  She  had a missed  and underwent D&C on 2023.  She presented back to the ER on 3/8 for left breast cellulitis again.  Ultrasound showed hypoechoic area spanning 4.7 x 1.1 cm concerning for abscess.  Aspiration was attempted in the ER but nothing could be aspirated.  She was placed on Keflex.  She reports that she continued to worsen and presented back to the ER last night.  Repeat ultrasound showed similar appearing fluid collection.  She has not had any drainage from the area of the abscess over the nipple.      Family history of breast cancer: Yes -maternal aunt in her 50s.  Great-grandmother    Imaging:  All imaging studies reviewed by me.    Recent Results (from the past 744 hour(s))   US Breast Left Limited 1-3 Quadrants    Narrative    ULTRASOUND LEFT BREAST - 3/8/2023 1:24 AM    HISTORY: Left breast pain. Evaluate for cellulitis or abscess.      COMPARISON:  None.     FINDINGS: Targeted ultrasound evaluation of the left upper outer  breast at the site of concern demonstrates an irregular hypoechoic  area spanning approximately 4.7 x 1.1 x 0.8 cm in maximal dimensions,  concerning for an abscess.        Impression    IMPRESSION: BI-RADS CATEGORY: 2 - Benign Finding(s).    RECOMMENDED FOLLOW-UP: Clinical Follow-up.  Clinical follow-up is recommended, with management dependent on the  results/findings of the physical examination.     GODWIN LLANES MD         SYSTEM ID:  [id number]   POC US SOFT TISSUE    Impression    [facility] Procedure Note     Limited Bedside ED Ultrasound of Soft Tissue:    PROCEDURE: PERFORMED BY: Dr. Jimmie Burris,   INDICATIONS/SYMPTOM: Skin redness, evaluate for abscess, cellulitis or foreign body  PROBE: High frequency linear probe  BODY LOCATION: Soft tissue located on left breast     FINDINGS: Cobblestoning of soft tissue: present   Hypoechoic fluid (ie abscess) identified: present      INTERPRETATION:  The soft tissue and muscle layers were evaluated.      Findings  indicate cellulitis and abscess    IMAGE DOCUMENTATION: Images were not archived.     US Breast Left Limited 1-3 Quadrants    Narrative    US BREAST LEFT LIMITED 1-3 QUADRANTS, 3/15/2023 7:39 AM    HISTORY:  Left breast abscess.     COMPARISON:  3/8/2023     FINDINGS:  Targeted left breast ultrasound in the upper outer quadrant  redemonstrates the previously seen hypoechoic collection. The  dimensions are similar to the previous ultrasound of 3/8/2023. Maximum  transverse dimension is 3.9 cm. No new abnormality.      Impression    IMPRESSION: BI-RADS CATEGORY: 3 - Probably Benign Finding-Short  Interval Follow-Up Suggested.  Persistent collection in the left breast compatible with abscess.  Given that this has been refractory to antibiotic therapy,  granulomatous mastitis would also be a consideration. Surgical  consultation has been arranged through the emergency department.  Percutaneous drainage could be performed at the breast center if  clinically indicated.    RECOMMENDED FOLLOW-UP: Clinical Follow-up.    ARISTIDES PETERSON MD         SYSTEM ID:  [id number]     Past Medical History:   has a past medical history of Anemia (05/18/2014) and Pancreatitis (08/2014).    Past Surgical History:  Past Surgical History:   Procedure Laterality Date     DILATION AND CURETTAGE SUCTION WITH ULTRASOUND GUIDANCE N/A 2/7/2023    Procedure: Ultrasound Guided Suction Dilation and Curettage;  Surgeon: Max Martinez MD;  Location: [facility] OR     LAPAROSCOPIC CHOLECYSTECTOMY WITH CHOLANGIOGRAMS N/A 9/24/2014    Procedure: LAPAROSCOPIC CHOLECYSTECTOMY WITH CHOLANGIOGRAMS;  Surgeon: Sheldon Watts MD;  Location: [facility] OR       Family History:  No family history on file.   As above    Social History:  Social History     Socioeconomic History     Marital status:      Spouse name: Not on file     Number of children: Not on file     Years of education: Not on file     Highest education level: Not on file   Occupational History     Not  "on file   Tobacco Use     Smoking status: Never     Smokeless tobacco: Never   Substance and Sexual Activity     Alcohol use: No     Drug use: No     Sexual activity: Yes     Partners: Male     Birth control/protection: None     Comment: 1/27/2012  ek   Other Topics Concern     Not on file   Social History Narrative     Not on file     Social Determinants of Health     Financial Resource Strain: Not on file   Food Insecurity: Not on file   Transportation Needs: Not on file   Physical Activity: Not on file   Stress: Not on file   Social Connections: Not on file   Intimate Partner Violence: Not on file   Housing Stability: Not on file        ROS:  The 10 point review of systems is negative other than noted in the HPI and above.    PE:  Vitals: BP 95/49 (BP Location: Left arm)   Pulse 65   Temp 97.7  F (36.5  C) (Oral)   Resp 16   Ht 1.6 m (5' 3\")   Wt 85.8 kg (189 lb 1.6 oz)   LMP 02/07/2023   SpO2 97%   BMI 33.50 kg/m    General appearance: well-nourished, sitting comfortably, no apparent distress  Respirations:  Unlabored  CV: RRR  Extremities: Warm, without edema  Neurologic: alert, speech is clear, moves all extremities with good strength  Psychiatric: Mood and affect are appropriate  Skin: Without lesions, rashes, or jaundice  Breast:    Left breast: Prior I&D scar at 12:00 periareolar well-healed.  At 2:00 in the upper outer quadrant there is a 5 cm area of cellulitis with thinning skin and fluctuance.  Lymph:      Axillary adenopathy: none      Re aRmos MD            "

## 2023-03-15 NOTE — H&P
[facility]    History and Physical  Hospitalist       Date of Admission:  3/15/2023    Assessment and Plan:      Stacia Hannon is a 27 year old female no significant past medical history has been having ongoing left breast issues since 9/22.  Initially diagnosed as a cyst was given Keflex however symptoms have persisted over the past few months she was seen on the ED 3/8 and was given Keflex per patient herself symptoms of redness erythema improved while on antibiotics however worsened again and she presented to the ED today.    The ED vitals stable breast ultrasound was performed results are pending.  Surgery was called as well and they will evaluate patient early in the morning.    Patient was given IV Zosyn and admitted further for evaluation and treatment of breast abscess.    Left Breast abscess  -We will continue IV Zosyn.  General surgery consulted.  Ultrasound imaging results pending.  Patient would benefit from with imaging MRI breast to rule out malignancy as this has been going on for almost a few months and highly suspicious for underlying malignancy.       Mild hyponatremia  -monitor        ---------     # Code status: Full   # Anticipated discharge date and Disposition:1-2 days  # DVT: SCDS  # IVF:  none                      Lary Ruby MD  Text Page (7am - 6pm, M-F)          Primary Care Physician   [facility]    Chief Complaint   Breast pain    History is obtained from the patient    History of Present Illness   Stacia Hannon is a 27 year old female no significant past medical history has been having ongoing left breast issues since 9/22.  Initially diagnosed as a cyst was given Keflex however symptoms have persisted over the past few months she was seen on the ED 3/8 and was given Keflex per patient herself symptoms of redness erythema improved while on antibiotics however worsened again and she presented to the ED today.  Patient states she does have  diarrhea since being on antibiotics otherwise no other complaints no chest pain shortness of breath nausea vomiting abdominal pain      Past Medical History    I have reviewed this patient's medical history and updated it with pertinent information if needed.   Past Medical History:   Diagnosis Date     Anemia 05/18/2014     Pancreatitis 08/2014       Past Surgical History   I have reviewed this patient's surgical history and updated it with pertinent information if needed.  Past Surgical History:   Procedure Laterality Date     DILATION AND CURETTAGE SUCTION WITH ULTRASOUND GUIDANCE N/A 2/7/2023    Procedure: Ultrasound Guided Suction Dilation and Curettage;  Surgeon: Max Martinez MD;  Location:  OR     LAPAROSCOPIC CHOLECYSTECTOMY WITH CHOLANGIOGRAMS N/A 9/24/2014    Procedure: LAPAROSCOPIC CHOLECYSTECTOMY WITH CHOLANGIOGRAMS;  Surgeon: Sheldon Watts MD;  Location:  OR       Prior to Admission Medications   Prior to Admission Medications   Prescriptions Last Dose Informant Patient Reported? Taking?   calcium-vitamin D 500-125 MG-UNIT TABS   Yes No   Sig: Take 1 tablet by mouth 2 times daily   cephALEXin (KEFLEX) 500 MG capsule   No No   Sig: Take 1 capsule (500 mg) by mouth 4 times daily for 7 days   sulfamethoxazole-trimethoprim (BACTRIM DS) 800-160 MG tablet   No No   Sig: Take 1 tablet by mouth 2 times daily for 7 days      Facility-Administered Medications: None     Allergies   Allergies   Allergen Reactions     Doxycycline Other (See Comments)     Diarrhea       Social History   I have reviewed this patient's social history and updated it with pertinent information if needed. Stacia Hannon  reports that she has never smoked. She has never used smokeless tobacco. She reports that she does not drink alcohol and does not use drugs.    Family History   Family history reviewed with patient and is noncontributory.    ROS   point recent discussed with patient negative as per HPI    Physical Exam    Temp: 98  F (36.7  C) Temp src: Temporal BP: 117/68 Pulse: 77   Resp: 16 SpO2: 100 % O2 Device: None (Room air)    Vital Signs with Ranges  Temp:  [98  F (36.7  C)] 98  F (36.7  C)  Pulse:  [77] 77  Resp:  [16] 16  BP: (117)/(68) 117/68  SpO2:  [100 %] 100 %  0 lbs 0 oz    General: Pt in NAD, normal appearance  HEENT: PERRLA, EOMI, normocephalic / atraumatic no cervical LAD, no bruit, no pallor, WNL oropharynx, neck supple  Cardiac: +S1, S2, RRR, no MRG, no edema  Lungs: Clear to Auscultation Bilateral, normal breathing without accessory muscle usage, no wheezing, rhonchi or crackles  GI: soft NT/ND +bowel sounds all quadrants  Psych: normal mood and affect, A&Ox3  Neurological: A&O x3, nonfocal skin: warm, left breast erythematous nodular mass  Data   Data reviewed today:  I personally reviewed no images or EKG's today.  Recent Labs   Lab 03/15/23  0410   WBC 7.0   HGB 12.2   MCV 93      *   POTASSIUM 4.1   CHLORIDE 100   CO2 21*   BUN 12.2   CR 0.58   ANIONGAP 13   CAROL 8.8   *       No results found for this or any previous visit (from the past 24 hour(s)).

## 2023-03-15 NOTE — ED NOTES
[facility]  ED Nurse Handoff Report    Stacia Hannon is a 27 year old female   ED Chief complaint: Breast Pain  . ED Diagnosis:   Final diagnoses:   Breast abscess   Cellulitis of left breast     Allergies:   Allergies   Allergen Reactions     Doxycycline Other (See Comments)     Diarrhea       Code Status: Full Code  Activity level - Baseline/Home:  Independent. Activity Level - Current:   Independent. Lift room needed: No. Bariatric: No   Needed: No   Isolation: No. Infection: Not Applicable.     Vital Signs:   Vitals:    03/15/23 0058 03/15/23 0610 03/15/23 0745   BP: 117/68 106/62 91/49   BP Location:  Right arm Right arm   Pulse: 77 70 67   Resp: 16 16 16   Temp: 98  F (36.7  C) 98.2  F (36.8  C) 97.7  F (36.5  C)   TempSrc: Temporal Oral Oral   SpO2: 100% 97% 97%       Cardiac Rhythm:  ,      Pain level:    Patient confused: No. Patient Falls Risk: No.   Elimination Status: Has voided   Patient Report - Initial Complaint: Breast pain . Focused Assessment: Stacia Hannon is a 27 year old female who presents with ongoing left breast pain. In 9/22 the patient was seen in ED and diagnosed with left breast cyst, started on Keflex. She had subsequent surgery to remove this in 11/22. She was seen again on 3/8/23 in ED for 3 weeks of recurring left breast pain, at which time she was started on another course of Keflex as well as Bactrim for cellulitis. In ED, the provider attempted US-guided I&D of her breast, with formal US also obtained (see below). She has been unable to follow-up with her surgeon since starting on antibiotics. While her symptoms initially improved, they have since recurred and she states the redness to her breast hasn't gone away. The patient does note some mild nausea yesterday but otherwise no vomiting or fever. She reports chance for pregnancy due to unprotected sexual activity, although states she had a miscarriage 1 month ago and hasn't had her menses yet since. No  blood thinners. Last PO intake was at 2100 last night, 6-7 hours ago.      Tests Performed:   Labs Ordered and Resulted from Time of ED Arrival to Time of ED Departure   BASIC METABOLIC PANEL - Abnormal       Result Value    Sodium 134 (*)     Potassium 4.1      Chloride 100      Carbon Dioxide (CO2) 21 (*)     Anion Gap 13      Urea Nitrogen 12.2      Creatinine 0.58      Calcium 8.8      Glucose 122 (*)     GFR Estimate >90     HCG QUALITATIVE PREGNANCY - Normal    hCG Serum Qualitative Negative     CBC WITH PLATELETS AND DIFFERENTIAL    WBC Count 7.0      RBC Count 3.98      Hemoglobin 12.2      Hematocrit 36.8      MCV 93      MCH 30.7      MCHC 33.2      RDW 12.1      Platelet Count 330      % Neutrophils 46      % Lymphocytes 41      % Monocytes 9      % Eosinophils 3      % Basophils 1      % Immature Granulocytes 0      NRBCs per 100 WBC 0      Absolute Neutrophils 3.3      Absolute Lymphocytes 2.8      Absolute Monocytes 0.6      Absolute Eosinophils 0.2      Absolute Basophils 0.0      Absolute Immature Granulocytes 0.0      Absolute NRBCs 0.0     BLOOD CULTURE   BLOOD CULTURE     US Breast Left Limited 1-3 Quadrants   Final Result   IMPRESSION: BI-RADS CATEGORY: 3 - Probably Benign Finding-Short   Interval Follow-Up Suggested.   Persistent collection in the left breast compatible with abscess.   Given that this has been refractory to antibiotic therapy,   granulomatous mastitis would also be a consideration. Surgical   consultation has been arranged through the emergency department.   Percutaneous drainage could be performed at the breast center if   clinically indicated.      RECOMMENDED FOLLOW-UP: Clinical Follow-up.      ARISTIDES PETERSON MD            SYSTEM ID:  [id number]         Abnormal Results: see above.   Treatments provided: abx, pain meds, (see MAR)  Family Comments: So at bedside  OBS brochure/video discussed/provided to patient:  No  ED Medications:   Medications   lidocaine 1 % 0.1-1 mL (has no  administration in time range)   lidocaine (LMX4) cream (has no administration in time range)   sodium chloride (PF) 0.9% PF flush 3 mL (3 mLs Intracatheter $Given 3/15/23 0633)   sodium chloride (PF) 0.9% PF flush 3 mL (has no administration in time range)   melatonin tablet 1 mg (has no administration in time range)   piperacillin-tazobactam (ZOSYN) infusion 3.375 g (has no administration in time range)   vancomycin (VANCOCIN) 1,750 mg in 0.9% NaCl 500 mL intermittent infusion (1,750 mg Intravenous $Given 3/15/23 0632)   sodium chloride 0.9% infusion (has no administration in time range)   piperacillin-tazobactam (ZOSYN) intermittent infusion 4.5 g (0 g Intravenous Stopped 3/15/23 0520)   HYDROmorphone (PF) (DILAUDID) injection 0.5 mg (0.5 mg Intravenous $Given 3/15/23 0415)     Drips infusing:  No  For the majority of the shift, the patient's behavior Green. Interventions performed were n/a.    Sepsis treatment initiated: No     Patient tested for COVID 19 prior to admission: NO    ED Nurse Name/Phone Number: Juan Manuel Fischer RN,   5:17 AM    RECEIVING UNIT ED HANDOFF REVIEW    Above ED Nurse Handoff Report was reviewed: Yes  Reviewed by: Zachary Soliz RN on March 15, 2023 at 8:10 AM

## 2023-03-15 NOTE — ANESTHESIA POSTPROCEDURE EVALUATION
Patient: Stacia Hannon    Procedure: Procedure(s):  INCISION AND DRAINAGE, BREAST       Anesthesia Type:  General    Note:  Disposition: Inpatient   Postop Pain Control: Uneventful            Sign Out: Well controlled pain   PONV: No   Neuro/Psych: Uneventful            Sign Out: Acceptable/Baseline neuro status   Airway/Respiratory: Uneventful            Sign Out: Acceptable/Baseline resp. status   CV/Hemodynamics: Uneventful            Sign Out: Acceptable CV status   Other NRE: NONE   DID A NON-ROUTINE EVENT OCCUR? No           Last vitals:  Vitals Value Taken Time   /60 03/15/23 1133   Temp 97.6  F (36.4  C) 03/15/23 1153   Pulse 58 03/15/23 1140   Resp 19 03/15/23 1140   SpO2 97 % 03/15/23 1145   Vitals shown include unvalidated device data.    Electronically Signed By: Jonathan Hurst MD  March 15, 2023  1:27 PM

## 2023-03-15 NOTE — ED TRIAGE NOTES
Pt was seen here on the 7th for a lump and pain in left breast. She was put on abx and completed the course but continues to have more swelling and pain. Denies fever. Pt adds that she has also some body aches and fatigue. She states she took a home covid test which was negative and declines a test here.      Triage Assessment     Row Name 03/15/23 0056       Triage Assessment (Adult)    Airway WDL WDL       Respiratory WDL    Respiratory WDL WDL       Skin Circulation/Temperature WDL    Skin Circulation/Temperature WDL WDL       Cardiac WDL    Cardiac WDL WDL       Peripheral/Neurovascular WDL    Peripheral Neurovascular WDL WDL       Cognitive/Neuro/Behavioral WDL    Cognitive/Neuro/Behavioral WDL WDL

## 2023-03-15 NOTE — ANESTHESIA CARE TRANSFER NOTE
Patient: Stacia Hannon    Procedure: Procedure(s):  INCISION AND DRAINAGE, BREAST       Diagnosis: Breast abscess [N61.1]  Diagnosis Additional Information: No value filed.    Anesthesia Type:   General     Note:    Oropharynx: oropharynx clear of all foreign objects  Level of Consciousness: drowsy  Oxygen Supplementation: face mask  Level of Supplemental Oxygen (L/min / FiO2): 6  Independent Airway: airway patency satisfactory and stable  Dentition: dentition unchanged  Vital Signs Stable: post-procedure vital signs reviewed and stable  Report to RN Given: handoff report given  Patient transferred to: PACU    Handoff Report: Identifed the Patient, Identified the Reponsible Provider, Reviewed the pertinent medical history, Discussed the surgical course, Reviewed Intra-OP anesthesia mangement and issues during anesthesia, Set expectations for post-procedure period and Allowed opportunity for questions and acknowledgement of understanding      Vitals:  Vitals Value Taken Time   /67 03/15/23 1113   Temp     Pulse 47 03/15/23 1115   Resp 11 03/15/23 1115   SpO2 100 % 03/15/23 1115   Vitals shown include unvalidated device data.    Electronically Signed By: GEORGIA Cortez CRNA  March 15, 2023  11:16 AM

## 2023-03-15 NOTE — ED PROVIDER NOTES
History     Chief Complaint:  Breast Pain     HPI   Stacia Hannon is a 27 year old female who presents with ongoing left breast pain. In 9/22 the patient was seen in ED and diagnosed with left breast cyst, started on Keflex. She had subsequent surgery to remove this in 11/22. She was seen again on 3/8/23 in ED for 3 weeks of recurring left breast pain, at which time she was started on another course of Keflex as well as Bactrim for cellulitis. In ED, the provider attempted US-guided I&D of her breast, with formal US also obtained (see below). She has been unable to follow-up with her surgeon since starting on antibiotics. While her symptoms initially improved, they have since recurred and she states the redness to her breast hasn't gone away. The patient does note some mild nausea yesterday but otherwise no vomiting or fever. She reports chance for pregnancy due to unprotected sexual activity, although states she had a miscarriage 1 month ago and hasn't had her menses yet since. No blood thinners. Last PO intake was at 2100 last night, 6-7 hours ago.    US Breast Left Limited 1-3 Quadrants - 3/8/23  Targeted ultrasound evaluation of the left upper outer breast at the site of concern demonstrates an irregular hypoechoic area spanning approximately 4.7 x 1.1 x 0.8 cm in maximal dimensions, concerning for an abscess.      Independent Historian:   None - Patient Only    Review of External Notes: Reviewed ED note from 9/22 - antibiotics for left breast cyst; surgery note from 11/22 to remove cyst; ED provider note on 3/8/23 - I&D for left breast abscess and antibiotic treatment choices.    ROS:  Review of Systems   Constitutional: Negative for fever.   Gastrointestinal: Positive for nausea (resolved). Negative for vomiting.   Skin:        (+) left breast pain and redness   All other systems reviewed and are negative.    Allergies:  Doxycycline     Medications:    Keflex  Bactrim    Past Medical History:     Anemia  Pancreatitis  Breast abscess  Vitamin D deficiency  Non-morbid obesity    Past Surgical History:    D&C with US guidance  Laparoscopic cholecystectomy with cholangiograms    Family History:    Diabetes mellitus  Heart disease  Hypercholesterolemia  Stroke    Social History:  The patient presented with her .  The patient arrived in a private vehicle.  PCP: [facility], [place]     Physical Exam     Patient Vitals for the past 24 hrs:   BP Temp Temp src Pulse Resp SpO2   03/15/23 0058 117/68 98  F (36.7  C) Temporal 77 16 100 %      Physical Exam  Nursing note and vitals reviewed.  Constitutional:  Appears well-developed and well-nourished.   HENT:   Head:    Atraumatic.   Mouth/Throat:   Oropharynx is clear and moist. No oropharyngeal exudate.   Eyes:    Pupils are equal, round, and reactive to light.   Neck:    Normal range of motion. Neck supple.      No tracheal deviation present. No thyromegaly present.   Cardiovascular:  Normal rate, regular rhythm, no murmur   Pulmonary/Chest: Breath sounds are clear and equal without wheezes or crackles.  Abdominal:   Soft. Bowel sounds are normal. Exhibits no distension and      no mass. There is no tenderness.      There is no rebound and no guarding.   Musculoskeletal:  Exhibits no edema.   Lymphadenopathy:  No cervical adenopathy.   Neurological:   Alert and oriented to person, place, and time.   Skin:    Skin is warm and dry. No rash noted. No pallor. Left breast swelling, tenderness, and warmth over the superior and lateral aspect of the breast with palpable fluid collection.    Emergency Department Course     Imaging:  US Breast Left Limited 1-3 Quadrants   Final Result   IMPRESSION: BI-RADS CATEGORY: 3 - Probably Benign Finding-Short   Interval Follow-Up Suggested.   Persistent collection in the left breast compatible with abscess.   Given that this has been refractory to antibiotic therapy,   granulomatous mastitis would also be a consideration.  Surgical   consultation has been arranged through the emergency department.   Percutaneous drainage could be performed at the breast center if   clinically indicated.      RECOMMENDED FOLLOW-UP: Clinical Follow-up.      ARISTIDES PETERSON MD            SYSTEM ID:  [id number]         Report per radiology    Laboratory:  Labs Ordered and Resulted from Time of ED Arrival to Time of ED Departure   BASIC METABOLIC PANEL - Abnormal       Result Value    Sodium 134 (*)     Potassium 4.1      Chloride 100      Carbon Dioxide (CO2) 21 (*)     Anion Gap 13      Urea Nitrogen 12.2      Creatinine 0.58      Calcium 8.8      Glucose 122 (*)     GFR Estimate >90     HCG QUALITATIVE PREGNANCY - Normal    hCG Serum Qualitative Negative     CBC WITH PLATELETS AND DIFFERENTIAL    WBC Count 7.0      RBC Count 3.98      Hemoglobin 12.2      Hematocrit 36.8      MCV 93      MCH 30.7      MCHC 33.2      RDW 12.1      Platelet Count 330      % Neutrophils 46      % Lymphocytes 41      % Monocytes 9      % Eosinophils 3      % Basophils 1      % Immature Granulocytes 0      NRBCs per 100 WBC 0      Absolute Neutrophils 3.3      Absolute Lymphocytes 2.8      Absolute Monocytes 0.6      Absolute Eosinophils 0.2      Absolute Basophils 0.0      Absolute Immature Granulocytes 0.0      Absolute NRBCs 0.0     BLOOD CULTURE   BLOOD CULTURE      Emergency Department Course & Assessments:     Interventions:  Medications   piperacillin-tazobactam (ZOSYN) intermittent infusion 4.5 g (4.5 g Intravenous $New Bag 3/15/23 0416)   HYDROmorphone (PF) (DILAUDID) injection 0.5 mg (0.5 mg Intravenous $Given 3/15/23 0415)    0632 Vancomycin 1,750 mg IV    Assessments:  0340 I obtained history and examined the patient as noted above.  0510 I rechecked the patient and explained findings. I discussed plan for admission and the patient is in agreement.    Independent Interpretation (X-rays, CTs, rhythm strip):  None    Consultations/Discussion of Management or Tests:  ED  Course as of 03/15/23 0513   Wed Mar 15, 2023   4421 I consulted with Dr. Sultana, general surgery, regarding the patient's history and presentation here in the emergency department. One of his partners will take over care later in the morning.   0510 I consulted with Dr. Ruby, hospitalist, regarding the patient's history and presentation here in the emergency department who accepted the patient for admission.     Disposition:  The patient was admitted.    Impression & Plan      Medical Decision Making:  I found this patient to have breast abscess with cellulitis which is refractory to antibiotic therapy.  Ultrasound today shows persistence of the abscess, therefore I consulted general surgery who recommended that admit the patient to the hospitalist service and treat her with Zosyn and vancomycin IV, then surgery would see her in consultation later today.  She is not appearing septic here.  She is admitted to the care of Dr. Ruby.    Diagnosis:    ICD-10-CM    1. Breast abscess  N61.1       2. Cellulitis of left breast  N61.0          Scribe Disclosure:  I, Aranza Harman, am serving as a scribe at 5:09 AM on 3/15/2023 to document services personally performed by Mony Bateman MD based on my observations and the provider's statements to me.     3/15/2023   Mony Bateman MD [place], Cheri Lee, MD  03/15/23 5173

## 2023-03-20 LAB
BACTERIA ABSC ANAEROBE+AEROBE CULT: NO GROWTH
BACTERIA BLD CULT: NO GROWTH
BACTERIA BLD CULT: NO GROWTH
BACTERIA TISS BX CULT: NO GROWTH

## 2023-03-21 ENCOUNTER — OFFICE VISIT (OUTPATIENT)
Dept: SURGERY | Facility: CLINIC | Age: 28
End: 2023-03-21
Payer: COMMERCIAL

## 2023-03-21 VITALS
WEIGHT: 189 LBS | SYSTOLIC BLOOD PRESSURE: 100 MMHG | BODY MASS INDEX: 33.49 KG/M2 | OXYGEN SATURATION: 96 % | HEART RATE: 64 BPM | RESPIRATION RATE: 16 BRPM | HEIGHT: 63 IN | DIASTOLIC BLOOD PRESSURE: 70 MMHG

## 2023-03-21 DIAGNOSIS — B37.31 YEAST INFECTION OF THE VAGINA: Primary | ICD-10-CM

## 2023-03-21 PROCEDURE — 99024 POSTOP FOLLOW-UP VISIT: CPT | Performed by: SURGERY

## 2023-03-21 RX ORDER — FLUCONAZOLE 150 MG/1
150 TABLET ORAL ONCE
Qty: 1 TABLET | Refills: 0 | Status: SHIPPED | OUTPATIENT
Start: 2023-03-21 | End: 2023-03-21

## 2023-03-21 NOTE — LETTER
"2023    RE: Stacia Hannon, : 1995      [facility] Follow Up     Subjective:  Stacia is here for her first postoperative visit. She underwent left breast I&D with debridement and biopsy of breast tissue and penrose drain on 3/15. Today she  tells me she is doing quite well. She currently has minimal pain. Didn't use any oxycodone. Changing gauze to left breast twice per day. Clear-red drainage. Keflex gave her vaginal yeast infection.     Objective:  /70   Pulse 64   Resp 16   Ht 1.6 m (5' 3\")   Wt 85.7 kg (189 lb)   LMP 2023   SpO2 96%   BMI 33.48 kg/m    Breast - 2 oclock just lateral to areola 2cm opening with penrose drain, surrounding skin without erythema, just mildly irritated. Penrose and nylon suture removed. tiny residual cavity, no drainage.      Pathology:    Final Diagnosis   Soft tissue, left breast, excision-  Granulation tissue with fat necrosis, acute, chronic and foreign body inflammatory change, no evidence of malignancy      Assessment:  27 year old F s/p repeat I&D and breast biopsy for recurrent left breast inflammation/fluid collection, no growth on cultures with final pathology showing inflammation, report mentions 'granulation tissue' but no granulomas. Concern for possible granulomatous mastitis with the presentation of recurrent breast mastitis without any bacterial growth. Will discuss with pathologist  Plan:  Cover wound with gauze until healed over  RTC 2 weeks to ensure wound healing  Stop keflex - no growth on cultures  Fluconazole 150mg x 1 prescribed for vaginal yeast infection       Re Ramos MD            "

## 2023-03-21 NOTE — PROGRESS NOTES
"[facility] Follow Up    Subjective:  Stacia is here for her first postoperative visit. She underwent left breast I&D with debridement and biopsy of breast tissue and penrose drain on 3/15. Today she  tells me she is doing quite well. She currently has minimal pain. Didn't use any oxycodone. Changing gauze to left breast twice per day. Clear-red drainage. Keflex gave her vaginal yeast infection.    Objective:  /70   Pulse 64   Resp 16   Ht 1.6 m (5' 3\")   Wt 85.7 kg (189 lb)   LMP 02/07/2023   SpO2 96%   BMI 33.48 kg/m    Breast - 2 oclock just lateral to areola 2cm opening with penrose drain, surrounding skin without erythema, just mildly irritated. Penrose and nylon suture removed. tiny residual cavity, no drainage.     Pathology:     Final Diagnosis   Soft tissue, left breast, excision-  Granulation tissue with fat necrosis, acute, chronic and foreign body inflammatory change, no evidence of malignancy           Assessment:  27 year old F s/p repeat I&D and breast biopsy for recurrent left breast inflammation/fluid collection, no growth on cultures with final pathology showing inflammation, report mentions 'granulation tissue' but no granulomas. Concern for possible granulomatous mastitis with the presentation of recurrent breast mastitis without any bacterial growth. Will discuss with pathologist  Plan:  Cover wound with gauze until healed over  RTC 2 weeks to ensure wound healing  Stop keflex - no growth on cultures  Fluconazole 150mg x 1 prescribed for vaginal yeast infection    Re Ramos MD      Please route or send letter to:  *None*        "

## 2023-03-22 LAB
BACTERIA ABSC ANAEROBE+AEROBE CULT: NORMAL
BACTERIA TISS BX CULT: NORMAL

## 2023-04-04 ENCOUNTER — OFFICE VISIT (OUTPATIENT)
Dept: SURGERY | Facility: CLINIC | Age: 28
End: 2023-04-04
Payer: COMMERCIAL

## 2023-04-04 VITALS
DIASTOLIC BLOOD PRESSURE: 66 MMHG | HEART RATE: 72 BPM | SYSTOLIC BLOOD PRESSURE: 102 MMHG | RESPIRATION RATE: 16 BRPM | OXYGEN SATURATION: 97 %

## 2023-04-04 DIAGNOSIS — N60.02 BREAST CYST, LEFT: ICD-10-CM

## 2023-04-04 DIAGNOSIS — N61.22 GRANULOMATOUS MASTITIS OF LEFT BREAST: Primary | ICD-10-CM

## 2023-04-04 PROCEDURE — 99024 POSTOP FOLLOW-UP VISIT: CPT | Performed by: SURGERY

## 2023-04-04 NOTE — PATIENT INSTRUCTIONS
LEFT BREAST ULTRASOUND POSSIBLE ASPIRATION     Date: 4-5-23  Time: 11:00 AM    [facility] [place] [address] [address] [facility]  [address]  [place]  [address]          Please check in at  10:45 AM

## 2023-04-04 NOTE — LETTER
April 4, 2023    RE:   Stacia Hannon 1995      Dear Colleague,    Thank you for referring your patient, Stacia Hannon, to [facility]. Please see a copy of my visit note below.    [facility] Follow Up     Subjective:  Stacia is here for her second postoperative visit. She underwent left breast I&D with debridement and biopsy of breast tissue and penrose drain on 3/15.   2 weeks ago at her postop visit Penrose drain was removed.  She states now things are healing well.  Very small amount of drainage from the healing I&D site.  She has developed some swelling and hardness under the prior I&D site at 12:00 over the last few days with some increased pain around the nipple.  She has not tried any pain medications yet.     Objective:  /66   Pulse 72   Resp 16   LMP 02/07/2023   SpO2 97%   Breast - 2 oclock just lateral to areola 1cm opening with granulation tissue, no expressible drainage.  Prior 12:00 periareolar I&D site pinpoint opening with small clear drainage on palpation.  Just above this at 12:00 in the breast there is an area of firm induration, no obvious fluctuance and no erythema of the breast skin      Assessment:  27 year old F with recurrent left breast mastitis status post I&D at 12:00 in November 2022, and I&D at 2:00 periareolar 3/15/2023.  There was no abscess fluid just clear serosanguineous fluid.  She has had no growth on cultures.  Last I&D I performed a tissue biopsy of underlying breast tissue which showed inflammation and granulation tissue but no granulomas.  However given the presentation I am still concerned for possible granulomatous mastitis     Plan:  We will proceed with breast ultrasound for the 12:00 area of new induration in the breast center with possible aspiration if forming another fluid collection.  If there is a mass in the area radiology can proceed with tissue biopsy to look for evidence of granulomatous mastitis.  At this point we discussed  I am not concerned for an underlying cancer without any family history and the findings as discussed.   Ibuprofen and tylenol for pain prn  Return to clinic in 2 weeks            Again, thank you for allowing me to participate in the care of your patient.      Sincerely,    Re Ramos MD

## 2023-04-04 NOTE — PROGRESS NOTES
[facility] Follow Up    Subjective:  Stacia is here for her second postoperative visit. She underwent left breast I&D with debridement and biopsy of breast tissue and penrose drain on 3/15.   2 weeks ago at her postop visit Penrose drain was removed.  She states now things are healing well.  Very small amount of drainage from the healing I&D site.  She has developed some swelling and hardness under the prior I&D site at 12:00 over the last few days with some increased pain around the nipple.  She has not tried any pain medications yet.    Objective:  /66   Pulse 72   Resp 16   LMP 02/07/2023   SpO2 97%   Breast - 2 oclock just lateral to areola 1cm opening with granulation tissue, no expressible drainage.  Prior 12:00 periareolar I&D site pinpoint opening with small clear drainage on palpation.  Just above this at 12:00 in the breast there is an area of firm induration, no obvious fluctuance and no erythema of the breast skin      Assessment:  27 year old F with recurrent left breast mastitis status post I&D at 12:00 in November 2022, and I&D at 2:00 periareolar 3/15/2023.  There was no abscess fluid just clear serosanguineous fluid.  She has had no growth on cultures.  Last I&D I performed a tissue biopsy of underlying breast tissue which showed inflammation and granulation tissue but no granulomas.  However given the presentation I am still concerned for possible granulomatous mastitis    Plan:  We will proceed with breast ultrasound for the 12:00 area of new induration in the breast center with possible aspiration if forming another fluid collection.  If there is a mass in the area radiology can proceed with tissue biopsy to look for evidence of granulomatous mastitis.  At this point we discussed I am not concerned for an underlying cancer without any family history and the findings as discussed.   Ibuprofen and tylenol for pain prn  Return to clinic in 2 weeks      Re Ramos  MD

## 2023-04-05 ENCOUNTER — HOSPITAL ENCOUNTER (OUTPATIENT)
Dept: ULTRASOUND IMAGING | Facility: CLINIC | Age: 28
Discharge: HOME | End: 2023-04-05
Attending: SURGERY
Payer: COMMERCIAL

## 2023-04-05 ENCOUNTER — ANCILLARY ORDERS (OUTPATIENT)
Dept: SURGERY | Facility: CLINIC | Age: 28
End: 2023-04-05

## 2023-04-05 DIAGNOSIS — N60.02 BREAST CYST, LEFT: ICD-10-CM

## 2023-04-05 PROCEDURE — 88305 TISSUE EXAM BY PATHOLOGIST: CPT | Mod: TC | Performed by: SURGERY

## 2023-04-05 PROCEDURE — 250N000009 HC RX 250: Performed by: RADIOLOGY

## 2023-04-05 PROCEDURE — 76642 ULTRASOUND BREAST LIMITED: CPT | Mod: LT

## 2023-04-05 PROCEDURE — 272N000615 US BREAST BIOPSY CORE NEEDLE LEFT

## 2023-04-05 PROCEDURE — 88305 TISSUE EXAM BY PATHOLOGIST: CPT | Mod: 26 | Performed by: PATHOLOGY

## 2023-04-05 RX ADMIN — LIDOCAINE HYDROCHLORIDE 5 ML: 10 INJECTION, SOLUTION EPIDURAL; INFILTRATION; INTRACAUDAL; PERINEURAL at 11:39

## 2023-04-07 LAB
PATH REPORT.COMMENTS IMP SPEC: NORMAL
PATH REPORT.FINAL DX SPEC: NORMAL
PATH REPORT.GROSS SPEC: NORMAL
PATH REPORT.MICROSCOPIC SPEC OTHER STN: NORMAL
PATH REPORT.RELEVANT HX SPEC: NORMAL
PHOTO IMAGE: NORMAL

## 2023-04-10 DIAGNOSIS — N61.22 GRANULOMATOUS MASTITIS OF LEFT BREAST: Primary | ICD-10-CM

## 2023-04-10 RX ORDER — CELECOXIB 200 MG/1
200 CAPSULE ORAL 2 TIMES DAILY
Qty: 180 CAPSULE | Refills: 0 | Status: SHIPPED | OUTPATIENT
Start: 2023-04-10 | End: 2023-06-06

## 2023-04-11 ENCOUNTER — TELEPHONE (OUTPATIENT)
Dept: SURGERY | Facility: CLINIC | Age: 28
End: 2023-04-11
Payer: COMMERCIAL

## 2023-04-11 NOTE — TELEPHONE ENCOUNTER
Name of caller: Stacia    Reason for Call:  Pt wants to discuss Celebrex that was prescribed. Concerned of side effects.    Surgeon:  Dr. Ramos    Recent Surgery:  Yes.      Best phone number to reach pt at is: [phone]  Ok to leave a message with medical info? Yes.    Pharmacy preferred (if calling for a refill): NA

## 2023-04-11 NOTE — TELEPHONE ENCOUNTER
Procedure: excisional debridement of left breast abscess    Date: 3/15/2023    Surgeon: Richard    Patient wondering if it is okay to continue working out at the gym and using protein powder while she is on celebrex? She reports Dr. Ramos mentioned something about steroids.  Informed her that she was likely telling her to avoid non steroidal anti-inflammatory medications, like ibuprofen, while on celebrex.  She verbalized understanding    She is also wanting to make sure it is okay if she continues taking b12 and other vitamins while she is on celebrex.  Informed her that if she was not told to stop them while on celebrex, then it should be fine.    Will verify with Dr. Ramos that she is okay to stay on vitamins and call her back    She is in agreement with the plan    Molly Workman RN-BSN

## 2023-04-12 NOTE — TELEPHONE ENCOUNTER
Called patient with Dr. Ramos's advice/recommendations:    Vitamins are all fine   Avoid NSAID s   Re The steroid question - I recommended ultrasound guided triamcinolone injection into the breast lesion at the breast center. They should be calling her to schedule that       Patient verbalized understanding. She will call PRN    Molly Workman RN-BSN

## 2023-04-17 ENCOUNTER — APPOINTMENT (OUTPATIENT)
Dept: URGENT CARE | Facility: CLINIC | Age: 28
End: 2023-04-17
Payer: COMMERCIAL

## 2023-04-18 DIAGNOSIS — R12 HEARTBURN: Primary | ICD-10-CM

## 2023-04-22 ENCOUNTER — HEALTH MAINTENANCE LETTER (OUTPATIENT)
Age: 28
End: 2023-04-22

## 2023-06-06 ENCOUNTER — HOSPITAL ENCOUNTER (OUTPATIENT)
Dept: MAMMOGRAPHY | Facility: CLINIC | Age: 28
Discharge: HOME | End: 2023-06-06
Attending: SURGERY | Admitting: RADIOLOGY
Payer: COMMERCIAL

## 2023-06-06 DIAGNOSIS — N61.22 GRANULOMATOUS MASTITIS OF LEFT BREAST: ICD-10-CM

## 2023-06-06 PROCEDURE — 11900 INJECT SKIN LESIONS </W 7: CPT

## 2023-06-06 PROCEDURE — 250N000009 HC RX 250: Performed by: SURGERY

## 2023-06-06 PROCEDURE — 250N000011 HC RX IP 250 OP 636: Performed by: RADIOLOGY

## 2023-06-06 RX ORDER — TRIAMCINOLONE ACETONIDE 40 MG/ML
80 INJECTION, SUSPENSION INTRA-ARTICULAR; INTRAMUSCULAR ONCE
Status: CANCELLED | OUTPATIENT
Start: 2023-06-06 | End: 2023-06-06

## 2023-06-06 RX ORDER — TRIAMCINOLONE ACETONIDE 40 MG/ML
80 INJECTION, SUSPENSION INTRA-ARTICULAR; INTRAMUSCULAR ONCE
Status: DISCONTINUED | OUTPATIENT
Start: 2023-06-06 | End: 2023-06-07 | Stop reason: HOSPADM

## 2023-06-06 RX ORDER — TRIAMCINOLONE ACETONIDE 40 MG/ML
80 INJECTION, SUSPENSION INTRA-ARTICULAR; INTRAMUSCULAR ONCE
Status: COMPLETED | OUTPATIENT
Start: 2023-06-06 | End: 2023-06-06

## 2023-06-06 RX ORDER — FLUCONAZOLE 150 MG/1
150 TABLET ORAL ONCE
Status: ON HOLD | COMMUNITY
Start: 2023-03-21 | End: 2024-08-15

## 2023-06-06 RX ADMIN — TRIAMCINOLONE ACETONIDE 80 MG: 40 INJECTION, SUSPENSION INTRA-ARTICULAR; INTRAMUSCULAR at 08:00

## 2023-06-06 RX ADMIN — LIDOCAINE HYDROCHLORIDE 5 ML: 10 INJECTION, SOLUTION EPIDURAL; INFILTRATION; INTRACAUDAL; PERINEURAL at 07:56

## 2023-06-13 ENCOUNTER — MYC MEDICAL ADVICE (OUTPATIENT)
Dept: SURGERY | Facility: CLINIC | Age: 28
End: 2023-06-13
Payer: COMMERCIAL

## 2023-06-15 ENCOUNTER — HOSPITAL ENCOUNTER (EMERGENCY)
Facility: CLINIC | Age: 28
Discharge: HOME | End: 2023-06-15
Attending: EMERGENCY MEDICINE | Admitting: EMERGENCY MEDICINE
Payer: COMMERCIAL

## 2023-06-15 VITALS
BODY MASS INDEX: 34.02 KG/M2 | DIASTOLIC BLOOD PRESSURE: 90 MMHG | WEIGHT: 192 LBS | SYSTOLIC BLOOD PRESSURE: 136 MMHG | TEMPERATURE: 97.5 F | OXYGEN SATURATION: 99 % | RESPIRATION RATE: 18 BRPM | HEART RATE: 68 BPM | HEIGHT: 63 IN

## 2023-06-15 DIAGNOSIS — J01.00 ACUTE MAXILLARY SINUSITIS, RECURRENCE NOT SPECIFIED: ICD-10-CM

## 2023-06-15 PROCEDURE — 250N000013 HC RX MED GY IP 250 OP 250 PS 637: Performed by: EMERGENCY MEDICINE

## 2023-06-15 PROCEDURE — 99284 EMERGENCY DEPT VISIT MOD MDM: CPT

## 2023-06-15 RX ORDER — LORATADINE 10 MG/1
10 TABLET ORAL DAILY
Qty: 10 TABLET | Refills: 0 | Status: ON HOLD | OUTPATIENT
Start: 2023-06-15 | End: 2024-08-15

## 2023-06-15 RX ORDER — ACETAMINOPHEN AND CODEINE PHOSPHATE 300; 30 MG/1; MG/1
2 TABLET ORAL ONCE
Status: COMPLETED | OUTPATIENT
Start: 2023-06-15 | End: 2023-06-15

## 2023-06-15 RX ADMIN — ACETAMINOPHEN AND CODEINE PHOSPHATE 2 TABLET: 300; 30 TABLET ORAL at 06:45

## 2023-06-15 NOTE — ED PROVIDER NOTES
"    History     Chief Complaint:  Dental Pain       HPI   Stacia Hannon is a 27 year old female presents with several days of left forehead and left cheek tenderness worse when she bites down or moves her head.  She has had URI symptoms with pressure and pain no fevers or chills no trauma.  The patient is worried about a sinus infection.            Medications:    amoxicillin-clavulanate (AUGMENTIN) 875-125 MG tablet  loratadine (CLARITIN) 10 MG tablet  fluconazole (DIFLUCAN) 150 MG tablet  omeprazole (PRILOSEC) 20 MG DR capsule        Past Medical History:    Past Medical History:   Diagnosis Date     Anemia 05/18/2014     Pancreatitis 08/2014       Past Surgical History:    Past Surgical History:   Procedure Laterality Date     DILATION AND CURETTAGE SUCTION WITH ULTRASOUND GUIDANCE N/A 2/7/2023    Procedure: Ultrasound Guided Suction Dilation and Curettage;  Surgeon: Max Martinez MD;  Location: [facility] OR     INCISION AND DRAINAGE BREAST Left 3/15/2023    Procedure: INCISION AND DRAINAGE, BREAST;  Surgeon: Re Ramos MD;  Location: [facility] OR     LAPAROSCOPIC CHOLECYSTECTOMY WITH CHOLANGIOGRAMS N/A 9/24/2014    Procedure: LAPAROSCOPIC CHOLECYSTECTOMY WITH CHOLANGIOGRAMS;  Surgeon: Sheldon Watts MD;  Location: [facility] OR          Physical Exam     Patient Vitals for the past 24 hrs:   BP Temp Temp src Pulse Resp SpO2 Height Weight   06/15/23 0319 (!) 136/90 97.5  F (36.4  C) Temporal 68 18 99 % 1.6 m (5' 3\") 87.1 kg (192 lb)        Physical Exam  General:  No respiratory distress    HENT: The patient has tenderness to percussion over left maxillary sinus as well as percussion over the tooth.  By palpation I did not find signs of a dental abscess there is no trismus    Cardiovascular: Good cap refill.    Respiratory: Breathing non labored.     Musculoskeletal: No tenderness. No bony deformity.     Skin: No rashes or petechiae     Neurologic: non focal      Psychiatric: Appropriate     Emergency Department " Course       Laboratory:  Labs Ordered and Resulted from Time of ED Arrival to Time of ED Departure - No data to display     Procedures       Emergency Department Course & Assessments:             Interventions:  Medications   acetaminophen-codeine (TYLENOL #3) 300-30 MG per tablet 2 tablet (2 tablets Oral $Given 6/15/23 0645)          Disposition:  The patient was discharged to home.     Impression & Plan        Medical Decision Making:  The patient presented complaining of pain in her tooth however by exam she did have a headache when I tapped over her left maxillary sinus she was significantly tender with her upper respiratory symptoms I felt this is most likely maxillary sinusitis I considered trismus or intra cranial processes including dental issues or ear pain however I think this is the sinus.  I did start her on decongestant and discussed that if that does not improve her she may need to start the antibiotic would hold on the antibiotic she does not appear to have encephalitis is neurologically intact and was discharged to close outpatient follow-up symptoms return for discussed.    Diagnosis:    ICD-10-CM    1. Acute maxillary sinusitis, recurrence not specified  J01.00            Discharge Medications:  Discharge Medication List as of 6/15/2023  6:45 AM      START taking these medications    Details   amoxicillin-clavulanate (AUGMENTIN) 875-125 MG tablet Take 1 tablet by mouth 2 times daily, Disp-20 tablet, R-0, Local Print      loratadine (CLARITIN) 10 MG tablet Take 1 tablet (10 mg) by mouth daily, Disp-10 tablet, R-0, Local Print                  6/15/2023   Zac Jeffries MD Farnan, Christopher M, MD  06/15/23 6307

## 2023-06-15 NOTE — ED TRIAGE NOTES
Pt here today for evaluation of R sided dental pain that started 2 days ago. Pt endorses she had a fever yesterday.

## 2023-10-27 ENCOUNTER — HOSPITAL ENCOUNTER (EMERGENCY)
Facility: CLINIC | Age: 28
Discharge: HOME | End: 2023-10-28
Attending: EMERGENCY MEDICINE | Admitting: EMERGENCY MEDICINE
Payer: COMMERCIAL

## 2023-10-27 VITALS
WEIGHT: 198 LBS | RESPIRATION RATE: 20 BRPM | OXYGEN SATURATION: 100 % | HEIGHT: 63 IN | DIASTOLIC BLOOD PRESSURE: 92 MMHG | TEMPERATURE: 98.8 F | SYSTOLIC BLOOD PRESSURE: 142 MMHG | BODY MASS INDEX: 35.08 KG/M2 | HEART RATE: 65 BPM

## 2023-10-27 DIAGNOSIS — J02.0 STREP PHARYNGITIS: ICD-10-CM

## 2023-10-27 DIAGNOSIS — U07.1 COVID-19 VIRUS INFECTION: ICD-10-CM

## 2023-10-27 PROCEDURE — 99285 EMERGENCY DEPT VISIT HI MDM: CPT | Mod: 25

## 2023-10-27 PROCEDURE — 96374 THER/PROPH/DIAG INJ IV PUSH: CPT | Mod: 59

## 2023-10-28 ENCOUNTER — APPOINTMENT (OUTPATIENT)
Dept: CT IMAGING | Facility: CLINIC | Age: 28
End: 2023-10-28
Attending: EMERGENCY MEDICINE
Payer: COMMERCIAL

## 2023-10-28 LAB
ANION GAP SERPL CALCULATED.3IONS-SCNC: 10 MMOL/L (ref 7–15)
BASOPHILS # BLD AUTO: 0 10E3/UL (ref 0–0.2)
BASOPHILS NFR BLD AUTO: 0 %
BUN SERPL-MCNC: 9.8 MG/DL (ref 6–20)
CALCIUM SERPL-MCNC: 9 MG/DL (ref 8.6–10)
CHLORIDE SERPL-SCNC: 103 MMOL/L (ref 98–107)
CREAT SERPL-MCNC: 0.46 MG/DL (ref 0.51–0.95)
DEPRECATED HCO3 PLAS-SCNC: 25 MMOL/L (ref 22–29)
EGFRCR SERPLBLD CKD-EPI 2021: >90 ML/MIN/1.73M2
EOSINOPHIL # BLD AUTO: 0.2 10E3/UL (ref 0–0.7)
EOSINOPHIL NFR BLD AUTO: 4 %
ERYTHROCYTE [DISTWIDTH] IN BLOOD BY AUTOMATED COUNT: 12 % (ref 10–15)
FLUAV RNA SPEC QL NAA+PROBE: NEGATIVE
FLUBV RNA RESP QL NAA+PROBE: NEGATIVE
GLUCOSE SERPL-MCNC: 184 MG/DL (ref 70–99)
GROUP A STREP BY PCR: DETECTED
HCG SERPL QL: NEGATIVE
HCT VFR BLD AUTO: 39.6 % (ref 35–47)
HGB BLD-MCNC: 13.1 G/DL (ref 11.7–15.7)
IMM GRANULOCYTES # BLD: 0 10E3/UL
IMM GRANULOCYTES NFR BLD: 0 %
LYMPHOCYTES # BLD AUTO: 2.5 10E3/UL (ref 0.8–5.3)
LYMPHOCYTES NFR BLD AUTO: 50 %
MCH RBC QN AUTO: 31 PG (ref 26.5–33)
MCHC RBC AUTO-ENTMCNC: 33.1 G/DL (ref 31.5–36.5)
MCV RBC AUTO: 94 FL (ref 78–100)
MONOCYTES # BLD AUTO: 0.4 10E3/UL (ref 0–1.3)
MONOCYTES NFR BLD AUTO: 8 %
NEUTROPHILS # BLD AUTO: 1.9 10E3/UL (ref 1.6–8.3)
NEUTROPHILS NFR BLD AUTO: 38 %
NRBC # BLD AUTO: 0 10E3/UL
NRBC BLD AUTO-RTO: 0 /100
PLATELET # BLD AUTO: 331 10E3/UL (ref 150–450)
POTASSIUM SERPL-SCNC: 3.4 MMOL/L (ref 3.4–5.3)
RBC # BLD AUTO: 4.22 10E6/UL (ref 3.8–5.2)
RSV RNA SPEC NAA+PROBE: NEGATIVE
SARS-COV-2 RNA RESP QL NAA+PROBE: POSITIVE
SODIUM SERPL-SCNC: 138 MMOL/L (ref 135–145)
TSH SERPL DL<=0.005 MIU/L-ACNC: 1.32 UIU/ML (ref 0.3–4.2)
WBC # BLD AUTO: 5.1 10E3/UL (ref 4–11)

## 2023-10-28 PROCEDURE — 250N000013 HC RX MED GY IP 250 OP 250 PS 637: Performed by: EMERGENCY MEDICINE

## 2023-10-28 PROCEDURE — 250N000011 HC RX IP 250 OP 636: Performed by: EMERGENCY MEDICINE

## 2023-10-28 PROCEDURE — 250N000009 HC RX 250: Performed by: EMERGENCY MEDICINE

## 2023-10-28 PROCEDURE — 84443 ASSAY THYROID STIM HORMONE: CPT | Performed by: EMERGENCY MEDICINE

## 2023-10-28 PROCEDURE — 84703 CHORIONIC GONADOTROPIN ASSAY: CPT | Performed by: EMERGENCY MEDICINE

## 2023-10-28 PROCEDURE — 87637 SARSCOV2&INF A&B&RSV AMP PRB: CPT | Performed by: EMERGENCY MEDICINE

## 2023-10-28 PROCEDURE — 70491 CT SOFT TISSUE NECK W/DYE: CPT

## 2023-10-28 PROCEDURE — 85025 COMPLETE CBC W/AUTO DIFF WBC: CPT | Performed by: EMERGENCY MEDICINE

## 2023-10-28 PROCEDURE — 250N000011 HC RX IP 250 OP 636: Mod: JZ | Performed by: EMERGENCY MEDICINE

## 2023-10-28 PROCEDURE — 80048 BASIC METABOLIC PNL TOTAL CA: CPT | Performed by: EMERGENCY MEDICINE

## 2023-10-28 PROCEDURE — 36415 COLL VENOUS BLD VENIPUNCTURE: CPT | Performed by: EMERGENCY MEDICINE

## 2023-10-28 PROCEDURE — 87651 STREP A DNA AMP PROBE: CPT | Performed by: EMERGENCY MEDICINE

## 2023-10-28 RX ORDER — IOPAMIDOL 755 MG/ML
500 INJECTION, SOLUTION INTRAVASCULAR ONCE
Status: COMPLETED | OUTPATIENT
Start: 2023-10-28 | End: 2023-10-28

## 2023-10-28 RX ORDER — DEXAMETHASONE SODIUM PHOSPHATE 10 MG/ML
10 INJECTION, SOLUTION INTRAMUSCULAR; INTRAVENOUS ONCE
Status: COMPLETED | OUTPATIENT
Start: 2023-10-28 | End: 2023-10-28

## 2023-10-28 RX ADMIN — SODIUM CHLORIDE 65 ML: 9 INJECTION, SOLUTION INTRAVENOUS at 01:28

## 2023-10-28 RX ADMIN — IOPAMIDOL 90 ML: 755 INJECTION, SOLUTION INTRAVENOUS at 01:28

## 2023-10-28 RX ADMIN — AMOXICILLIN AND CLAVULANATE POTASSIUM 1 TABLET: 875; 125 TABLET, FILM COATED ORAL at 02:07

## 2023-10-28 RX ADMIN — DEXAMETHASONE SODIUM PHOSPHATE 10 MG: 10 INJECTION, SOLUTION INTRAMUSCULAR; INTRAVENOUS at 00:39

## 2023-10-28 ASSESSMENT — ACTIVITIES OF DAILY LIVING (ADL): ADLS_ACUITY_SCORE: 37

## 2023-10-28 NOTE — DISCHARGE INSTRUCTIONS

## 2023-10-28 NOTE — ED PROVIDER NOTES
"  History     Chief Complaint:  Oral Swelling and Cough       The history is provided by the patient.      Stacia Hannon is a 28 year old female who presents with oral swelling and cough. She has been having a cough for the past 2 days. Today, she started having throat swelling at around 1700. There is no pain with swallowing but just difficulty. Denies history of thyroid issues, pain anywhere, or pregnancy.    Independent Historian:   None - Patient Only    Medications:    Augmentin  Diflucan  Claritin  Prilosec    Past Medical History:    Anemia  Pancreatitis    Past Surgical History:    D&C  Incision and drainage breast, left  Cholecystectomy       Physical Exam   Patient Vitals for the past 24 hrs:   BP Temp Temp src Pulse Resp SpO2 Height Weight   10/27/23 2250 (!) 142/92 98.8  F (37.1  C) Oral 65 20 100 % 1.6 m (5' 3\") 89.8 kg (198 lb)        Physical Exam  General: Patient is awake, alert  Head: The scalp, face, and head appear normal  Eyes: The pupils are equal, round, and reactive to light. Conjunctivae and sclerae are normal  ENT: Oropharynx reveals bilateral tonsillar edema with mild exudates.  Uvula is midline.  Low concern for peritonsillar abscess.  Neck: Normal range of motion.   CV: Regular rate and rhythm.   Resp: Lungs are clear without wheezes or rales. No respiratory distress.   GI: Abdomen is soft, no rigidity, guarding, or rebound. No distension. No tenderness to palpation in any quadrant.    MS: Normal tone. Joints grossly normal without effusions. No asymmetric leg swelling, calf or thigh tenderness.    Skin: No rash or lesions noted. Normal capillary refill noted  Neuro: Speech is normal and fluent. Face is symmetric. Moving all extremities.   Psych:  Normal affect.  Appropriate interactions.      Emergency Department Course     Imaging:  CT Soft Tissue Neck w Contrast   Final Result   IMPRESSION:    1.  Mild prominence of the tonsillar pillars suggestive of pharyngitis but no discrete " abscess formation.   2.  Mild reactive adenopathy.             Laboratory:  Labs Ordered and Resulted from Time of ED Arrival to Time of ED Departure   INFLUENZA A/B, RSV, & SARS-COV2 PCR - Abnormal       Result Value    Influenza A PCR Negative      Influenza B PCR Negative      RSV PCR Negative      SARS CoV2 PCR Positive (*)    BASIC METABOLIC PANEL - Abnormal    Sodium 138      Potassium 3.4      Chloride 103      Carbon Dioxide (CO2) 25      Anion Gap 10      Urea Nitrogen 9.8      Creatinine 0.46 (*)     GFR Estimate >90      Calcium 9.0      Glucose 184 (*)    GROUP A STREPTOCOCCUS PCR THROAT SWAB - Abnormal    Group A strep by PCR Detected (*)    TSH WITH FREE T4 REFLEX - Normal    TSH 1.32     HCG QUALITATIVE PREGNANCY - Normal    hCG Serum Qualitative Negative     CBC WITH PLATELETS AND DIFFERENTIAL    WBC Count 5.1      RBC Count 4.22      Hemoglobin 13.1      Hematocrit 39.6      MCV 94      MCH 31.0      MCHC 33.1      RDW 12.0      Platelet Count 331      % Neutrophils 38      % Lymphocytes 50      % Monocytes 8      % Eosinophils 4      % Basophils 0      % Immature Granulocytes 0      NRBCs per 100 WBC 0      Absolute Neutrophils 1.9      Absolute Lymphocytes 2.5      Absolute Monocytes 0.4      Absolute Eosinophils 0.2      Absolute Basophils 0.0      Absolute Immature Granulocytes 0.0      Absolute NRBCs 0.0            Emergency Department Course & Assessments:    Interventions:  Medications   dexAMETHasone PF (DECADRON) injection 10 mg (10 mg Intravenous $Given 10/28/23 0039)   CT scan flush (65 mLs Intravenous $Given 10/28/23 0128)   iopamidol (ISOVUE-370) solution 500 mL (90 mLs Intravenous $Given 10/28/23 0128)   amoxicillin-clavulanate (AUGMENTIN) 875-125 MG per tablet 1 tablet (1 tablet Oral $Given 10/28/23 0207)        Assessments:  0025 I examined the patient and obtained history as noted above.    Disposition:  The patient was discharged to home.     Impression & Plan      Medical Decision  Making:  This is an otherwise healthy 28-year-old patient who presents emergency department with throat swelling and cough.  Unfortunately patient was found to be positive for strep and COVID-19.  Patient be treated with Augmentin for strep and treated symptomatically for COVID-19.  No evidence of respiratory compromise.  Patient is able to swallow her own secretions.  No evidence of peritonsillar abscess or additional abscess formation on CT of the soft tissue of the neck.  Patient otherwise remained hemodynamically stable.  Was treated symptomatically as above.  We will have her follow-up closely with her primary care team and return to the emergency department with any new or worsening symptoms.    Diagnosis:    ICD-10-CM    1. Strep pharyngitis  J02.0       2. COVID-19 virus infection  U07.1            Discharge Medications:  Discharge Medication List as of 10/28/2023  2:09 AM             Scribe Disclosure:  Joseph GATES, am serving as a scribe at 12:19 AM on 10/28/2023 to document services personally performed by Zac Hudson MD based on my observations and the provider's statements to me.     10/28/2023   Zac Hudson MD Battista, Christopher Joseph, MD  10/28/23 0657

## 2023-10-28 NOTE — ED TRIAGE NOTES
Pt. Presents to ED with complaints of feeling like her throat is tight or like she's being choked that started tonight around 1700. Reports symptoms are worsening. Reports 2 days of cough, chest congestion, runny nose, body aches. Denies sore throat. Tonsils appear swollen. Pt. Speaking in full sentences, swallowing PO secretions, breathing even and unlabored. Denies no new foods or medications recently. Reports taking zicam a couple days ago and taking robitussin and ibuprofen tonight around 1800.      Triage Assessment (Adult)       Row Name 10/27/23 5446          Triage Assessment    Airway WDL all;WDL        Respiratory WDL    Respiratory WDL WDL        Skin Circulation/Temperature WDL    Skin Circulation/Temperature WDL WDL        Cardiac WDL    Cardiac WDL WDL        Peripheral/Neurovascular WDL    Peripheral Neurovascular WDL WDL        Cognitive/Neuro/Behavioral WDL    Cognitive/Neuro/Behavioral WDL WDL        [place] Coma Scale    Best Eye Response 4-->(E4) spontaneous     Best Motor Response 6-->(M6) obeys commands     Best Verbal Response 5-->(V5) oriented     [place] Coma Scale Score 15

## 2024-05-23 ENCOUNTER — HOSPITAL ENCOUNTER (EMERGENCY)
Facility: CLINIC | Age: 29
Discharge: HOME | End: 2024-05-24
Attending: EMERGENCY MEDICINE | Admitting: EMERGENCY MEDICINE
Payer: COMMERCIAL

## 2024-05-23 DIAGNOSIS — R42 LIGHTHEADEDNESS: ICD-10-CM

## 2024-05-23 DIAGNOSIS — R11.0 NAUSEA: ICD-10-CM

## 2024-05-23 LAB
BASOPHILS # BLD AUTO: 0 10E3/UL (ref 0–0.2)
BASOPHILS NFR BLD AUTO: 1 %
EOSINOPHIL # BLD AUTO: 0.2 10E3/UL (ref 0–0.7)
EOSINOPHIL NFR BLD AUTO: 2 %
ERYTHROCYTE [DISTWIDTH] IN BLOOD BY AUTOMATED COUNT: 12.3 % (ref 10–15)
HCT VFR BLD AUTO: 38.2 % (ref 35–47)
HGB BLD-MCNC: 12.9 G/DL (ref 11.7–15.7)
IMM GRANULOCYTES # BLD: 0 10E3/UL
IMM GRANULOCYTES NFR BLD: 0 %
LYMPHOCYTES # BLD AUTO: 2.9 10E3/UL (ref 0.8–5.3)
LYMPHOCYTES NFR BLD AUTO: 45 %
MCH RBC QN AUTO: 31.5 PG (ref 26.5–33)
MCHC RBC AUTO-ENTMCNC: 33.8 G/DL (ref 31.5–36.5)
MCV RBC AUTO: 93 FL (ref 78–100)
MONOCYTES # BLD AUTO: 0.4 10E3/UL (ref 0–1.3)
MONOCYTES NFR BLD AUTO: 6 %
NEUTROPHILS # BLD AUTO: 2.9 10E3/UL (ref 1.6–8.3)
NEUTROPHILS NFR BLD AUTO: 46 %
NRBC # BLD AUTO: 0 10E3/UL
NRBC BLD AUTO-RTO: 0 /100
PLATELET # BLD AUTO: 314 10E3/UL (ref 150–450)
RBC # BLD AUTO: 4.09 10E6/UL (ref 3.8–5.2)
WBC # BLD AUTO: 6.4 10E3/UL (ref 4–11)

## 2024-05-23 PROCEDURE — 99284 EMERGENCY DEPT VISIT MOD MDM: CPT | Mod: 25

## 2024-05-23 PROCEDURE — 36415 COLL VENOUS BLD VENIPUNCTURE: CPT | Performed by: EMERGENCY MEDICINE

## 2024-05-23 PROCEDURE — 80048 BASIC METABOLIC PNL TOTAL CA: CPT | Performed by: EMERGENCY MEDICINE

## 2024-05-23 PROCEDURE — 96374 THER/PROPH/DIAG INJ IV PUSH: CPT

## 2024-05-23 PROCEDURE — 93005 ELECTROCARDIOGRAM TRACING: CPT

## 2024-05-23 PROCEDURE — 85025 COMPLETE CBC W/AUTO DIFF WBC: CPT | Performed by: EMERGENCY MEDICINE

## 2024-05-23 PROCEDURE — 96361 HYDRATE IV INFUSION ADD-ON: CPT

## 2024-05-23 PROCEDURE — 250N000011 HC RX IP 250 OP 636: Performed by: EMERGENCY MEDICINE

## 2024-05-23 PROCEDURE — 258N000003 HC RX IP 258 OP 636: Performed by: EMERGENCY MEDICINE

## 2024-05-23 RX ORDER — ONDANSETRON 2 MG/ML
4 INJECTION INTRAMUSCULAR; INTRAVENOUS ONCE
Status: COMPLETED | OUTPATIENT
Start: 2024-05-23 | End: 2024-05-23

## 2024-05-23 RX ADMIN — SODIUM CHLORIDE 1000 ML: 9 INJECTION, SOLUTION INTRAVENOUS at 23:53

## 2024-05-23 RX ADMIN — ONDANSETRON 4 MG: 2 INJECTION INTRAMUSCULAR; INTRAVENOUS at 23:57

## 2024-05-23 ASSESSMENT — COLUMBIA-SUICIDE SEVERITY RATING SCALE - C-SSRS
2. HAVE YOU ACTUALLY HAD ANY THOUGHTS OF KILLING YOURSELF IN THE PAST MONTH?: NO
1. IN THE PAST MONTH, HAVE YOU WISHED YOU WERE DEAD OR WISHED YOU COULD GO TO SLEEP AND NOT WAKE UP?: NO
6. HAVE YOU EVER DONE ANYTHING, STARTED TO DO ANYTHING, OR PREPARED TO DO ANYTHING TO END YOUR LIFE?: NO

## 2024-05-23 ASSESSMENT — ACTIVITIES OF DAILY LIVING (ADL): ADLS_ACUITY_SCORE: 36

## 2024-05-24 VITALS
HEART RATE: 60 BPM | OXYGEN SATURATION: 98 % | TEMPERATURE: 98.7 F | DIASTOLIC BLOOD PRESSURE: 56 MMHG | BODY MASS INDEX: 33.31 KG/M2 | WEIGHT: 188 LBS | RESPIRATION RATE: 16 BRPM | SYSTOLIC BLOOD PRESSURE: 100 MMHG | HEIGHT: 63 IN

## 2024-05-24 LAB
ANION GAP SERPL CALCULATED.3IONS-SCNC: 14 MMOL/L (ref 7–15)
ATRIAL RATE - MUSE: 64 BPM
BUN SERPL-MCNC: 7.5 MG/DL (ref 6–20)
CALCIUM SERPL-MCNC: 8.9 MG/DL (ref 8.6–10)
CHLORIDE SERPL-SCNC: 103 MMOL/L (ref 98–107)
CREAT SERPL-MCNC: 0.42 MG/DL (ref 0.51–0.95)
DEPRECATED HCO3 PLAS-SCNC: 21 MMOL/L (ref 22–29)
DIASTOLIC BLOOD PRESSURE - MUSE: NORMAL MMHG
EGFRCR SERPLBLD CKD-EPI 2021: >90 ML/MIN/1.73M2
GLUCOSE SERPL-MCNC: 137 MG/DL (ref 70–99)
HCG SERPL QL: NEGATIVE
INTERPRETATION ECG - MUSE: NORMAL
P AXIS - MUSE: 14 DEGREES
POTASSIUM SERPL-SCNC: 3.6 MMOL/L (ref 3.4–5.3)
PR INTERVAL - MUSE: 134 MS
QRS DURATION - MUSE: 82 MS
QT - MUSE: 400 MS
QTC - MUSE: 412 MS
R AXIS - MUSE: 27 DEGREES
SODIUM SERPL-SCNC: 138 MMOL/L (ref 135–145)
SYSTOLIC BLOOD PRESSURE - MUSE: NORMAL MMHG
T AXIS - MUSE: 34 DEGREES
VENTRICULAR RATE- MUSE: 64 BPM

## 2024-05-24 PROCEDURE — 96361 HYDRATE IV INFUSION ADD-ON: CPT

## 2024-05-24 PROCEDURE — 36415 COLL VENOUS BLD VENIPUNCTURE: CPT | Performed by: EMERGENCY MEDICINE

## 2024-05-24 PROCEDURE — 84703 CHORIONIC GONADOTROPIN ASSAY: CPT | Performed by: EMERGENCY MEDICINE

## 2024-05-24 RX ORDER — ONDANSETRON 4 MG/1
4 TABLET, ORALLY DISINTEGRATING ORAL EVERY 8 HOURS PRN
Qty: 10 TABLET | Refills: 0 | Status: SHIPPED | OUTPATIENT
Start: 2024-05-24 | End: 2024-05-27

## 2024-05-24 ASSESSMENT — ACTIVITIES OF DAILY LIVING (ADL)
ADLS_ACUITY_SCORE: 38
ADLS_ACUITY_SCORE: 38

## 2024-05-24 NOTE — ED TRIAGE NOTES
"      Pt presents to triage with complains of \"feeling like gonna pass out\". Pt described as \"head feels heavy\" and nausea, since yesterday then feels better after eat something, but symptom returns today. Denied sick exposure or recent illness.Pt also notes that she feels confused and left eye heavy, left hand tingling, without  weakness, pt is complete oriented and answer all question appropriately in triage    ABC intact   "

## 2024-05-24 NOTE — ED PROVIDER NOTES
"  Emergency Department Note      History of Present Illness     Chief Complaint  Dizziness    HPI  Stacia Hannon is a 28 year old female who presents to the ED for dizziness. Yesterday morning, the patient took 8 mg of Phentermine, which she is prescribed. She has been taking it infrequently. Subsequently, she began to feel dizzy described more as lightheaded. Today, the lightheadedness was worse. She felt like passing out. She endorses nausea but no vomiting. She denies fever, chills, headache, focal weakness, sore throat, cough, chest pain, shortness of breath, abdominal pain, dysuria, or diarrhea. She does report starting her period yesterday though notes solely spotting.  Patient does not use alcohol or marijuana.     Independent Historian  None    Review of External Notes    Past Medical History   Medical History and Problem List  Anemia  Pancreatitis    Medications  Levonorgestrel  Fluconazole  Loratadine  Omeprazole    Surgical History   Breast incision and drainage  Cholecystectomy    Physical Exam   Patient Vitals for the past 24 hrs:   BP Temp Temp src Pulse Resp SpO2 Height Weight   05/23/24 2258 124/74 98.7  F (37.1  C) Oral 80 20 100 % 1.6 m (5' 3\") 85.3 kg (188 lb)     Physical Exam  General: Well-nourished, nontoxic  Eyes: PERRL, EOMI, no nystagmus.  No scleral icterus or conjunctival injection.    ENT:  Moist mucus membranes, posterior oropharynx clear without erythema or exudates. TM normal bilaterally  Neck: Supple with full range of motion  Respiratory:  Lungs clear to auscultation bilaterally, no crackles/rubs/wheezes.  Good air movement  CV: Normal rate and rhythm, no murmurs  GI:  Abdomen soft and non-distended.  No tenderness, guarding or rebound  Skin: Warm, dry.  No rashes or petechiae  MSK: No peripheral edema or calf tenderness  Neuro: Alert.  No aphasia/facial droop/dysarthria.  Tongue midline, normal strength at SCM/trapezius/BUE/BLE.  Normal finger to nose. Normal gait.  Sensation " intact over face/BUE/BLE  Psychiatric: Normal affect    Lab Results   Labs Ordered and Resulted from Time of ED Arrival to Time of ED Departure   BASIC METABOLIC PANEL - Abnormal       Result Value    Sodium 138      Potassium 3.6      Chloride 103      Carbon Dioxide (CO2) 21 (*)     Anion Gap 14      Urea Nitrogen 7.5      Creatinine 0.42 (*)     GFR Estimate >90      Calcium 8.9      Glucose 137 (*)    HCG QUALITATIVE PREGNANCY - Normal    hCG Serum Qualitative Negative     CBC WITH PLATELETS AND DIFFERENTIAL    WBC Count 6.4      RBC Count 4.09      Hemoglobin 12.9      Hematocrit 38.2      MCV 93      MCH 31.5      MCHC 33.8      RDW 12.3      Platelet Count 314      % Neutrophils 46      % Lymphocytes 45      % Monocytes 6      % Eosinophils 2      % Basophils 1      % Immature Granulocytes 0      NRBCs per 100 WBC 0      Absolute Neutrophils 2.9      Absolute Lymphocytes 2.9      Absolute Monocytes 0.4      Absolute Eosinophils 0.2      Absolute Basophils 0.0      Absolute Immature Granulocytes 0.0      Absolute NRBCs 0.0       EKG   ECG taken at 2309, ECG read at 2310  Normal sinus rhythm  Normal ECG   Rate 64 bpm. DC interval 134 ms. QRS duration 82 ms. QT/QTc 400/412 ms. P-R-T axes 14 27 34.    ED Course    Medications Administered  Medications   sodium chloride 0.9% BOLUS 1,000 mL (1,000 mLs Intravenous $New Bag 5/23/24 1027)   ondansetron (ZOFRAN) injection 4 mg (4 mg Intravenous $Given 5/23/24 9545)     Social Determinants of Health adding to complexity of care  None    ED Course  ED Course as of 05/24/24 0052   u May 23, 2024   2335 I obtained history and examined the patient as noted above.     Fri May 24, 2024   0051 I rechecked and updated the patient. The patient is comfortable with plan for discharge.       Medical Decision Making / Diagnosis   CMS Diagnoses: None    MIPS  None    MDM  Stacia Hannon is a 28 year old female presenting with predominant complaints of lightheadedness.  She is  nontoxic, in no significant distress.  Triage note reported eye heaviness as well as paresthesias though patient denies at bedside.  She is without focal neurodeficits.  I do not feel advanced neuroimaging is warranted as I doubt serious neurologic sequelae.  EKG without ischemic changes or underlying arrhythmia.  She denies any active chest pain, no audible murmur and I doubt serious cardiopulmonary process to explain her presentation.  Labs without profound anemia or significant electrolyte derangements.  She is not pregnant.  Patient was given IV fluids and antiemetics and reported significant symptom improvement.  I did recommend quite possible her presentation is secondary to her starting her menses though also cannot exclude developing viral etiology.  I recommended close monitoring of her symptoms for fever, increasing abdominal pain, chest pain, headache or should symptoms worsen or change to promptly represent.  P.o. hydration encouraged.  Antiemetics provided on discharge for breakthrough symptoms.    Disposition  The patient was discharged.     ICD-10 Codes:    ICD-10-CM    1. Lightheadedness  R42       2. Nausea  R11.0            Discharge Medications  Discharge Medication List as of 5/24/2024  1:26 AM        START taking these medications    Details   ondansetron (ZOFRAN ODT) 4 MG ODT tab Take 1 tablet (4 mg) by mouth every 8 hours as needed for nausea, Disp-10 tablet, R-0, Local Print           Scribe Disclosure:  Marquez GATES, am serving as a scribe at 12:13 AM on 5/24/2024 to document services personally performed by Dinora Murillo DO based on my observations and the provider's statements to me.        Dinora Murillo DO  05/24/24 8498

## 2024-06-01 ENCOUNTER — HOSPITAL ENCOUNTER (EMERGENCY)
Facility: CLINIC | Age: 29
Discharge: HOME | End: 2024-06-02
Attending: EMERGENCY MEDICINE | Admitting: EMERGENCY MEDICINE
Payer: COMMERCIAL

## 2024-06-01 VITALS
RESPIRATION RATE: 22 BRPM | HEIGHT: 63 IN | TEMPERATURE: 98.8 F | DIASTOLIC BLOOD PRESSURE: 64 MMHG | WEIGHT: 195.55 LBS | HEART RATE: 66 BPM | SYSTOLIC BLOOD PRESSURE: 102 MMHG | OXYGEN SATURATION: 98 % | BODY MASS INDEX: 34.65 KG/M2

## 2024-06-01 DIAGNOSIS — R42 LIGHTHEADEDNESS: ICD-10-CM

## 2024-06-01 LAB
ANION GAP SERPL CALCULATED.3IONS-SCNC: 12 MMOL/L (ref 7–15)
BASOPHILS # BLD AUTO: 0 10E3/UL (ref 0–0.2)
BASOPHILS NFR BLD AUTO: 0 %
BUN SERPL-MCNC: 10.9 MG/DL (ref 6–20)
CALCIUM SERPL-MCNC: 9 MG/DL (ref 8.6–10)
CHLORIDE SERPL-SCNC: 101 MMOL/L (ref 98–107)
CREAT SERPL-MCNC: 0.49 MG/DL (ref 0.51–0.95)
DEPRECATED HCO3 PLAS-SCNC: 25 MMOL/L (ref 22–29)
EGFRCR SERPLBLD CKD-EPI 2021: >90 ML/MIN/1.73M2
EOSINOPHIL # BLD AUTO: 0.2 10E3/UL (ref 0–0.7)
EOSINOPHIL NFR BLD AUTO: 2 %
ERYTHROCYTE [DISTWIDTH] IN BLOOD BY AUTOMATED COUNT: 12.3 % (ref 10–15)
GLUCOSE SERPL-MCNC: 152 MG/DL (ref 70–99)
HCG SERPL QL: NEGATIVE
HCT VFR BLD AUTO: 37 % (ref 35–47)
HGB BLD-MCNC: 12.3 G/DL (ref 11.7–15.7)
IMM GRANULOCYTES # BLD: 0 10E3/UL
IMM GRANULOCYTES NFR BLD: 1 %
LYMPHOCYTES # BLD AUTO: 2.1 10E3/UL (ref 0.8–5.3)
LYMPHOCYTES NFR BLD AUTO: 34 %
MCH RBC QN AUTO: 31.2 PG (ref 26.5–33)
MCHC RBC AUTO-ENTMCNC: 33.2 G/DL (ref 31.5–36.5)
MCV RBC AUTO: 94 FL (ref 78–100)
MONOCYTES # BLD AUTO: 0.4 10E3/UL (ref 0–1.3)
MONOCYTES NFR BLD AUTO: 6 %
NEUTROPHILS # BLD AUTO: 3.6 10E3/UL (ref 1.6–8.3)
NEUTROPHILS NFR BLD AUTO: 57 %
NRBC # BLD AUTO: 0 10E3/UL
NRBC BLD AUTO-RTO: 0 /100
PLATELET # BLD AUTO: 302 10E3/UL (ref 150–450)
POTASSIUM SERPL-SCNC: 3.5 MMOL/L (ref 3.4–5.3)
RBC # BLD AUTO: 3.94 10E6/UL (ref 3.8–5.2)
SODIUM SERPL-SCNC: 138 MMOL/L (ref 135–145)
WBC # BLD AUTO: 6.2 10E3/UL (ref 4–11)

## 2024-06-01 PROCEDURE — 84703 CHORIONIC GONADOTROPIN ASSAY: CPT | Performed by: EMERGENCY MEDICINE

## 2024-06-01 PROCEDURE — 80048 BASIC METABOLIC PNL TOTAL CA: CPT | Performed by: EMERGENCY MEDICINE

## 2024-06-01 PROCEDURE — 36415 COLL VENOUS BLD VENIPUNCTURE: CPT | Performed by: EMERGENCY MEDICINE

## 2024-06-01 PROCEDURE — 99284 EMERGENCY DEPT VISIT MOD MDM: CPT | Mod: 25

## 2024-06-01 PROCEDURE — 96361 HYDRATE IV INFUSION ADD-ON: CPT

## 2024-06-01 PROCEDURE — 258N000003 HC RX IP 258 OP 636: Performed by: EMERGENCY MEDICINE

## 2024-06-01 PROCEDURE — 96360 HYDRATION IV INFUSION INIT: CPT | Mod: 59

## 2024-06-01 PROCEDURE — 93005 ELECTROCARDIOGRAM TRACING: CPT

## 2024-06-01 PROCEDURE — 85025 COMPLETE CBC W/AUTO DIFF WBC: CPT | Performed by: EMERGENCY MEDICINE

## 2024-06-01 RX ADMIN — SODIUM CHLORIDE 1000 ML: 9 INJECTION, SOLUTION INTRAVENOUS at 20:25

## 2024-06-01 ASSESSMENT — COLUMBIA-SUICIDE SEVERITY RATING SCALE - C-SSRS
2. HAVE YOU ACTUALLY HAD ANY THOUGHTS OF KILLING YOURSELF IN THE PAST MONTH?: NO
6. HAVE YOU EVER DONE ANYTHING, STARTED TO DO ANYTHING, OR PREPARED TO DO ANYTHING TO END YOUR LIFE?: NO
1. IN THE PAST MONTH, HAVE YOU WISHED YOU WERE DEAD OR WISHED YOU COULD GO TO SLEEP AND NOT WAKE UP?: NO

## 2024-06-01 ASSESSMENT — ACTIVITIES OF DAILY LIVING (ADL)
ADLS_ACUITY_SCORE: 36
ADLS_ACUITY_SCORE: 38

## 2024-06-02 NOTE — ED TRIAGE NOTES
Pt reports dizziness that started today some tingling in arms.  Pt states that she was seen for this a week ago and everything was ok.  She states that the paramedics checked her blood pressure at work and said she should come in     Triage Assessment (Adult)       Row Name 06/01/24 1918          Triage Assessment    Airway WDL WDL        Respiratory WDL    Respiratory WDL WDL        Skin Circulation/Temperature WDL    Skin Circulation/Temperature WDL WDL        Cardiac WDL    Cardiac WDL WDL        Peripheral/Neurovascular WDL    Peripheral Neurovascular WDL WDL        Cognitive/Neuro/Behavioral WDL    Cognitive/Neuro/Behavioral WDL WDL

## 2024-06-02 NOTE — ED PROVIDER NOTES
"  Emergency Department Note      History of Present Illness     Chief Complaint  Dizziness    HPI  Stacia Hannon is a 28 year old female with history of anemia who presents with dizziness. 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 Reports feeling \"hot\". She felt similar symptoms and was seen here a few days ago; she was at home at the time. No nausea, diaphoresis, leg swelling, chest pain, or chest pressure. Blood pressure en route by EMS was 170 systolic. No medical problems. Reports her mom has angina. No recent travel. No drugs or alcohol. She takes phentermine     Independent Historian  None    Review of External Notes  ER visit from 7 days prior, presentation for similar symptoms    Past Medical History   Medical History and Problem List  Anemia  Pancreatitis     Medications  Fluconazole  Omeprazole     Surgical History   D&C  I&D of breast  Laparoscopic cholecystectomy with cholangiograms     Physical Exam   Patient Vitals for the past 24 hrs:   BP Temp Temp src Pulse Resp SpO2 Height Weight   06/01/24 2130 102/64 -- -- 66 -- 98 % -- --   06/01/24 2030 103/63 -- -- 62 -- 96 % -- --   06/01/24 2027 106/66 -- -- 61 -- -- -- --   06/01/24 1917 (!) 140/79 98.8  F (37.1  C) Temporal 70 22 99 % 1.6 m (5' 3\") 88.7 kg (195 lb 8.8 oz)     Physical Exam  Constitutional: Alert, attentive, GCS 15  Mouth: Mucous membranes dry  Eyes: EOM are normal, anicteric, conjugate gaze  CV: regular rate and rhythm   Chest: Effort normal and breath sounds clear without wheezing or rales, symmetric bilaterally   GI:  non tender. No distension. No guarding or rebound.    MSK: No LE edema, no tenderness to palpation of BLE.  Neurological: Alert, attentive, moving all extremities equally.   Skin: Skin is warm and dry.     Diagnostics   Lab Results   Labs Ordered and Resulted from Time of ED Arrival to Time of ED Departure   BASIC METABOLIC PANEL - Abnormal       " Result Value    Sodium 138      Potassium 3.5      Chloride 101      Carbon Dioxide (CO2) 25      Anion Gap 12      Urea Nitrogen 10.9      Creatinine 0.49 (*)     GFR Estimate >90      Calcium 9.0      Glucose 152 (*)    HCG QUALITATIVE PREGNANCY - Normal    hCG Serum Qualitative Negative     CBC WITH PLATELETS AND DIFFERENTIAL    WBC Count 6.2      RBC Count 3.94      Hemoglobin 12.3      Hematocrit 37.0      MCV 94      MCH 31.2      MCHC 33.2      RDW 12.3      Platelet Count 302      % Neutrophils 57      % Lymphocytes 34      % Monocytes 6      % Eosinophils 2      % Basophils 0      % Immature Granulocytes 1      NRBCs per 100 WBC 0      Absolute Neutrophils 3.6      Absolute Lymphocytes 2.1      Absolute Monocytes 0.4      Absolute Eosinophils 0.2      Absolute Basophils 0.0      Absolute Immature Granulocytes 0.0      Absolute NRBCs 0.0       EKG   ECG results from 06/01/24   EKG 12 lead     Value    Systolic Blood Pressure     Diastolic Blood Pressure     Ventricular Rate 61    Atrial Rate 61    AZ Interval 152    QRS Duration 82        QTc 410    P Axis 10    R AXIS 26    T Axis 24    Interpretation ECG      Sinus rhythm  Normal ECG  When compared with ECG of 23-MAY-2024 23:09,  No significant change was found       Independent Interpretation  None  ED Course    Medications Administered  Medications   sodium chloride 0.9% BOLUS 1,000 mL (0 mLs Intravenous Stopped 6/1/24 5991)       Procedures  Procedures     Discussion of Management  None    Social Determinants of Health adding to complexity of care  None    ED Course  ED Course as of 06/02/24 0157   Sat Jun 01, 2024 1956 I entered the patient's room and obtained history.   2308 I rechecked the patient and explained findings. I believe that they are safe for discharge at this time.     Medical Decision Making / Diagnosis   CMS Diagnoses: None    MIPS     None    [facility]  Stacia Hannon is a 28 year old female presenting for lightheadedness that she  was going to pass out, she was able to endorses some prodromal symptoms of feeling hot and had worked a double shift with almost normal fluid intake.  She denies any overt chest pain, chest pressure, she had what I think was a spurious blood pressure check on scene 170s that she has been persistently in the low 100s here, it was this elevated blood pressure that prompted her to return as she was evaluated for similar symptoms several days prior with negative workup.  Low suspicion for cardiac etiology, she is PERC negative.  No indication for chest imaging.  High suspicion this is likely orthostasis and dehydration, she does have borderline blood sugars historically and reports she has had almost nothing to drink over the last few days since taking phentermine which she plans to stop.  I recommended adequate hydration, slow send distance from sitting to standing and walking, and PCP follow-up.  Return precautions reviewed and she was discharged.    Disposition  The patient was discharged.     ICD-10 Codes:    ICD-10-CM    1. Lightheadedness  R42            Cedric Hillman MD  [employer]  1:56 AM 06/02/24       Scribe Disclosure:  Yusuf GATES Hired, am serving as a scribe at 7:53 PM on 6/1/2024 to document services personally performed by Cedric Hillman MD based on my observations and the provider's statements to me.        Cedric Hillman MD  06/02/24 0157

## 2024-06-03 LAB
ATRIAL RATE - MUSE: 61 BPM
DIASTOLIC BLOOD PRESSURE - MUSE: NORMAL MMHG
INTERPRETATION ECG - MUSE: NORMAL
P AXIS - MUSE: 10 DEGREES
PR INTERVAL - MUSE: 152 MS
QRS DURATION - MUSE: 82 MS
QT - MUSE: 408 MS
QTC - MUSE: 410 MS
R AXIS - MUSE: 26 DEGREES
SYSTOLIC BLOOD PRESSURE - MUSE: NORMAL MMHG
T AXIS - MUSE: 24 DEGREES
VENTRICULAR RATE- MUSE: 61 BPM

## 2024-08-14 ENCOUNTER — HOSPITAL ENCOUNTER (INPATIENT)
Facility: CLINIC | Age: 29
LOS: 1 days | Discharge: HOME | End: 2024-08-16
Attending: EMERGENCY MEDICINE | Admitting: HOSPITALIST
Payer: COMMERCIAL

## 2024-08-14 DIAGNOSIS — N61.1 LEFT BREAST ABSCESS: ICD-10-CM

## 2024-08-14 PROCEDURE — 83735 ASSAY OF MAGNESIUM: CPT | Performed by: INTERNAL MEDICINE

## 2024-08-14 PROCEDURE — 36415 COLL VENOUS BLD VENIPUNCTURE: CPT | Performed by: EMERGENCY MEDICINE

## 2024-08-14 PROCEDURE — 84703 CHORIONIC GONADOTROPIN ASSAY: CPT | Performed by: EMERGENCY MEDICINE

## 2024-08-14 PROCEDURE — 87070 CULTURE OTHR SPECIMN AEROBIC: CPT | Performed by: EMERGENCY MEDICINE

## 2024-08-14 PROCEDURE — 99285 EMERGENCY DEPT VISIT HI MDM: CPT

## 2024-08-14 PROCEDURE — 85025 COMPLETE CBC W/AUTO DIFF WBC: CPT | Performed by: EMERGENCY MEDICINE

## 2024-08-14 PROCEDURE — 80048 BASIC METABOLIC PNL TOTAL CA: CPT | Performed by: EMERGENCY MEDICINE

## 2024-08-14 PROCEDURE — 87040 BLOOD CULTURE FOR BACTERIA: CPT | Performed by: EMERGENCY MEDICINE

## 2024-08-14 PROCEDURE — 83605 ASSAY OF LACTIC ACID: CPT | Performed by: EMERGENCY MEDICINE

## 2024-08-14 RX ORDER — KETOROLAC TROMETHAMINE 15 MG/ML
15 INJECTION, SOLUTION INTRAMUSCULAR; INTRAVENOUS ONCE
Status: COMPLETED | OUTPATIENT
Start: 2024-08-14 | End: 2024-08-15

## 2024-08-14 RX ORDER — CEFAZOLIN SODIUM 2 G/100ML
2 INJECTION, SOLUTION INTRAVENOUS ONCE
Status: COMPLETED | OUTPATIENT
Start: 2024-08-14 | End: 2024-08-15

## 2024-08-14 ASSESSMENT — ACTIVITIES OF DAILY LIVING (ADL): ADLS_ACUITY_SCORE: 36

## 2024-08-14 NOTE — LETTER
[facility] OBSERVATION DEPT  [address] [place] [address]  Phone: [phone]    August 16, 2024        Stacia Hannon  [facility] [place] [address]          To whom it may concern:    RE: Stacia Hannon    Please excuse the absences of 8/16/2024 and 8/17/2024 due to medical reasons.     Please contact me for questions or concerns.      Sincerely,      Dinora Murillo, DO

## 2024-08-15 PROBLEM — N61.1 LEFT BREAST ABSCESS: Status: ACTIVE | Noted: 2024-08-15

## 2024-08-15 LAB
ANION GAP SERPL CALCULATED.3IONS-SCNC: 13 MMOL/L (ref 7–15)
BASOPHILS # BLD AUTO: 0 10E3/UL (ref 0–0.2)
BASOPHILS NFR BLD AUTO: 0 %
BUN SERPL-MCNC: 7.8 MG/DL (ref 6–20)
CALCIUM SERPL-MCNC: 9.3 MG/DL (ref 8.8–10.4)
CHLORIDE SERPL-SCNC: 101 MMOL/L (ref 98–107)
CREAT SERPL-MCNC: 0.53 MG/DL (ref 0.51–0.95)
EGFRCR SERPLBLD CKD-EPI 2021: >90 ML/MIN/1.73M2
EOSINOPHIL # BLD AUTO: 0.2 10E3/UL (ref 0–0.7)
EOSINOPHIL NFR BLD AUTO: 2 %
ERYTHROCYTE [DISTWIDTH] IN BLOOD BY AUTOMATED COUNT: 12.3 % (ref 10–15)
GLUCOSE SERPL-MCNC: 163 MG/DL (ref 70–99)
HCG SERPL QL: NEGATIVE
HCO3 SERPL-SCNC: 24 MMOL/L (ref 22–29)
HCT VFR BLD AUTO: 39 % (ref 35–47)
HGB BLD-MCNC: 12.7 G/DL (ref 11.7–15.7)
IMM GRANULOCYTES # BLD: 0 10E3/UL
IMM GRANULOCYTES NFR BLD: 0 %
LACTATE SERPL-SCNC: 1.1 MMOL/L (ref 0.7–2)
LACTATE SERPL-SCNC: 2.4 MMOL/L (ref 0.7–2)
LYMPHOCYTES # BLD AUTO: 2.7 10E3/UL (ref 0.8–5.3)
LYMPHOCYTES NFR BLD AUTO: 40 %
MAGNESIUM SERPL-MCNC: 2 MG/DL (ref 1.7–2.3)
MCH RBC QN AUTO: 30.6 PG (ref 26.5–33)
MCHC RBC AUTO-ENTMCNC: 32.6 G/DL (ref 31.5–36.5)
MCV RBC AUTO: 94 FL (ref 78–100)
MONOCYTES # BLD AUTO: 0.5 10E3/UL (ref 0–1.3)
MONOCYTES NFR BLD AUTO: 7 %
NEUTROPHILS # BLD AUTO: 3.3 10E3/UL (ref 1.6–8.3)
NEUTROPHILS NFR BLD AUTO: 50 %
NRBC # BLD AUTO: 0 10E3/UL
NRBC BLD AUTO-RTO: 0 /100
PLATELET # BLD AUTO: 310 10E3/UL (ref 150–450)
POTASSIUM SERPL-SCNC: 3.2 MMOL/L (ref 3.4–5.3)
RBC # BLD AUTO: 4.15 10E6/UL (ref 3.8–5.2)
SODIUM SERPL-SCNC: 138 MMOL/L (ref 135–145)
WBC # BLD AUTO: 6.6 10E3/UL (ref 4–11)

## 2024-08-15 PROCEDURE — 250N000011 HC RX IP 250 OP 636: Performed by: EMERGENCY MEDICINE

## 2024-08-15 PROCEDURE — 99207 PR APP CREDIT; MD BILLING SHARED VISIT: CPT | Performed by: HOSPITALIST

## 2024-08-15 PROCEDURE — 96365 THER/PROPH/DIAG IV INF INIT: CPT

## 2024-08-15 PROCEDURE — 84132 ASSAY OF SERUM POTASSIUM: CPT | Performed by: INTERNAL MEDICINE

## 2024-08-15 PROCEDURE — 36415 COLL VENOUS BLD VENIPUNCTURE: CPT | Performed by: EMERGENCY MEDICINE

## 2024-08-15 PROCEDURE — 120N000001 HC R&B MED SURG/OB

## 2024-08-15 PROCEDURE — 99222 1ST HOSP IP/OBS MODERATE 55: CPT | Performed by: SURGERY

## 2024-08-15 PROCEDURE — 250N000011 HC RX IP 250 OP 636: Performed by: INTERNAL MEDICINE

## 2024-08-15 PROCEDURE — 250N000013 HC RX MED GY IP 250 OP 250 PS 637: Performed by: INTERNAL MEDICINE

## 2024-08-15 PROCEDURE — 36415 COLL VENOUS BLD VENIPUNCTURE: CPT | Performed by: INTERNAL MEDICINE

## 2024-08-15 PROCEDURE — 250N000013 HC RX MED GY IP 250 OP 250 PS 637: Performed by: EMERGENCY MEDICINE

## 2024-08-15 PROCEDURE — 96376 TX/PRO/DX INJ SAME DRUG ADON: CPT

## 2024-08-15 PROCEDURE — 258N000003 HC RX IP 258 OP 636: Performed by: EMERGENCY MEDICINE

## 2024-08-15 PROCEDURE — G0378 HOSPITAL OBSERVATION PER HR: HCPCS

## 2024-08-15 PROCEDURE — 96375 TX/PRO/DX INJ NEW DRUG ADDON: CPT

## 2024-08-15 PROCEDURE — 99222 1ST HOSP IP/OBS MODERATE 55: CPT | Performed by: INTERNAL MEDICINE

## 2024-08-15 PROCEDURE — 83605 ASSAY OF LACTIC ACID: CPT | Performed by: EMERGENCY MEDICINE

## 2024-08-15 RX ORDER — ACETAMINOPHEN 650 MG/1
650 SUPPOSITORY RECTAL EVERY 4 HOURS PRN
Status: DISCONTINUED | OUTPATIENT
Start: 2024-08-15 | End: 2024-08-16 | Stop reason: HOSPADM

## 2024-08-15 RX ORDER — ONDANSETRON 2 MG/ML
4 INJECTION INTRAMUSCULAR; INTRAVENOUS EVERY 6 HOURS PRN
Status: DISCONTINUED | OUTPATIENT
Start: 2024-08-15 | End: 2024-08-16 | Stop reason: HOSPADM

## 2024-08-15 RX ORDER — ONDANSETRON 4 MG/1
4 TABLET, ORALLY DISINTEGRATING ORAL EVERY 6 HOURS PRN
Status: DISCONTINUED | OUTPATIENT
Start: 2024-08-15 | End: 2024-08-16 | Stop reason: HOSPADM

## 2024-08-15 RX ORDER — AMOXICILLIN 250 MG
2 CAPSULE ORAL 2 TIMES DAILY PRN
Status: DISCONTINUED | OUTPATIENT
Start: 2024-08-15 | End: 2024-08-16 | Stop reason: HOSPADM

## 2024-08-15 RX ORDER — CEFAZOLIN SODIUM 1 G/3ML
1 INJECTION, POWDER, FOR SOLUTION INTRAMUSCULAR; INTRAVENOUS EVERY 8 HOURS
Status: DISCONTINUED | OUTPATIENT
Start: 2024-08-15 | End: 2024-08-15

## 2024-08-15 RX ORDER — POTASSIUM CHLORIDE 1500 MG/1
40 TABLET, EXTENDED RELEASE ORAL ONCE
Status: COMPLETED | OUTPATIENT
Start: 2024-08-15 | End: 2024-08-15

## 2024-08-15 RX ORDER — ACETAMINOPHEN 325 MG/1
650 TABLET ORAL EVERY 4 HOURS PRN
Status: DISCONTINUED | OUTPATIENT
Start: 2024-08-15 | End: 2024-08-16 | Stop reason: HOSPADM

## 2024-08-15 RX ORDER — AMOXICILLIN 250 MG
1 CAPSULE ORAL 2 TIMES DAILY PRN
Status: DISCONTINUED | OUTPATIENT
Start: 2024-08-15 | End: 2024-08-16 | Stop reason: HOSPADM

## 2024-08-15 RX ORDER — CEFAZOLIN SODIUM 2 G/100ML
2 INJECTION, SOLUTION INTRAVENOUS EVERY 8 HOURS
Status: DISCONTINUED | OUTPATIENT
Start: 2024-08-15 | End: 2024-08-16 | Stop reason: HOSPADM

## 2024-08-15 RX ADMIN — POTASSIUM CHLORIDE 40 MEQ: 1500 TABLET, EXTENDED RELEASE ORAL at 03:27

## 2024-08-15 RX ADMIN — ACETAMINOPHEN 650 MG: 325 TABLET, FILM COATED ORAL at 19:33

## 2024-08-15 RX ADMIN — CEFAZOLIN SODIUM 2 G: 2 INJECTION, SOLUTION INTRAVENOUS at 14:06

## 2024-08-15 RX ADMIN — KETOROLAC TROMETHAMINE 15 MG: 15 INJECTION, SOLUTION INTRAMUSCULAR; INTRAVENOUS at 00:03

## 2024-08-15 RX ADMIN — SODIUM CHLORIDE 1000 ML: 9 INJECTION, SOLUTION INTRAVENOUS at 00:04

## 2024-08-15 RX ADMIN — CEFAZOLIN SODIUM 2 G: 2 INJECTION, SOLUTION INTRAVENOUS at 00:03

## 2024-08-15 RX ADMIN — CEFAZOLIN SODIUM 2 G: 2 INJECTION, SOLUTION INTRAVENOUS at 07:12

## 2024-08-15 RX ADMIN — CEFAZOLIN SODIUM 2 G: 2 INJECTION, SOLUTION INTRAVENOUS at 21:47

## 2024-08-15 ASSESSMENT — ACTIVITIES OF DAILY LIVING (ADL)
DIFFICULTY_EATING/SWALLOWING: NO
ADLS_ACUITY_SCORE: 38
WEAR_GLASSES_OR_BLIND: NO
ADLS_ACUITY_SCORE: 20
ADLS_ACUITY_SCORE: 20
FALL_HISTORY_WITHIN_LAST_SIX_MONTHS: NO
ADLS_ACUITY_SCORE: 34
ADLS_ACUITY_SCORE: 34
ADLS_ACUITY_SCORE: 20
ADLS_ACUITY_SCORE: 38
ADLS_ACUITY_SCORE: 20
ADLS_ACUITY_SCORE: 20
ADLS_ACUITY_SCORE: 38
ADLS_ACUITY_SCORE: 34
ADLS_ACUITY_SCORE: 38
CHANGE_IN_FUNCTIONAL_STATUS_SINCE_ONSET_OF_CURRENT_ILLNESS/INJURY: NO
ADLS_ACUITY_SCORE: 38
ADLS_ACUITY_SCORE: 20
DRESSING/BATHING_DIFFICULTY: NO
ADLS_ACUITY_SCORE: 20
ADLS_ACUITY_SCORE: 34
CONCENTRATING,_REMEMBERING_OR_MAKING_DECISIONS_DIFFICULTY: NO
ADLS_ACUITY_SCORE: 20
ADLS_ACUITY_SCORE: 38
ADLS_ACUITY_SCORE: 21
TOILETING_ISSUES: NO
DOING_ERRANDS_INDEPENDENTLY_DIFFICULTY: NO
ADLS_ACUITY_SCORE: 20
WALKING_OR_CLIMBING_STAIRS_DIFFICULTY: NO
ADLS_ACUITY_SCORE: 38

## 2024-08-15 NOTE — PHARMACY-ADMISSION MEDICATION HISTORY
Pharmacist Admission Medication History    Admission medication history is complete. The information provided in this note is only as accurate as the sources available at the time of the update.    Information Source(s): Patient and CareEverywhere/SureScripts via in-person    Pertinent Information: None    Changes made to PTA medication list:  Added: None  Deleted: fluconazole x1, loratadine, omeprazole  Changed: None    Allergies reviewed with patient and updates made in EHR: no    Medication History Completed By: DICK WATSON RPH 8/15/2024 8:51 AM    No outpatient medications have been marked as taking for the 8/14/24 encounter (Hospital Encounter).

## 2024-08-15 NOTE — ED NOTES
Bed: ED12  Expected date:   Expected time:   Means of arrival:   Comments:  Triage breast abscess

## 2024-08-15 NOTE — H&P
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  Workup and treatment per below.    PLAN    Granulomatous Mastitis of Left Breast w/ Left Breast Abscess  -History of granulomatous mastitis of left breast and has needed incision and drainage procedures in past for abscesses  -Was getting steroid injections of left breast but lost her insurance so no longer doing this  -Presents with recurrent left abscess that spontaneously drained, no evidence of sepsis but will likely need further wound exploration  PLAN  -Cefazolin 2 g every 8 hours (no history of MRSA, past culture with strep epidermidis)  -NPO for general surgery consultation  -Social work consult as patient lacks insurance, will likely need help getting reenrolled    Other Issues  -Hypokalemia: Replacement protocol  -Lactic acidosis: Suspect due to volume depletion.  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  Patient has a history of granulomatous mastitis of left breast and has needed incision and drainage procedures in past for abscesses.  Was getting steroid injections of left breast but lost her insurance so no longer doing this.  Noticed abscess forming in the left breast about 3 days ago.  Fever to 101 at home.  Spontaneously started draining today.  In the ED, VSS.  WBC 6.6.  Patient refused to have ER physician to I&D and wanting general surgery to do it.  Started on antibiotics and admitted to medicine.    Review of Systems     A Comprehensive greater than 10 system review of systems was carried out.  Pertinent positives and negatives are  "noted above.  Otherwise negative for contributory information.     Past Medical History     Past Medical History:   Diagnosis Date    Anemia 05/18/2014    Pancreatitis 08/2014     Medications     Current Outpatient Medications   Medication Sig Dispense Refill    fluconazole (DIFLUCAN) 150 MG tablet Take 150 mg by mouth once (Patient not taking: Reported on 5/24/2024)      loratadine (CLARITIN) 10 MG tablet Take 1 tablet (10 mg) by mouth daily (Patient not taking: Reported on 5/24/2024) 10 tablet 0    omeprazole (PRILOSEC) 20 MG DR capsule Take 1 capsule (20 mg) by mouth every morning (Patient not taking: Reported on 5/24/2024) 30 capsule 0      Past Surgical History     Past Surgical History:   Procedure Laterality Date    DILATION AND CURETTAGE SUCTION WITH ULTRASOUND GUIDANCE N/A 2/7/2023    Procedure: Ultrasound Guided Suction Dilation and Curettage;  Surgeon: Max Martinez MD;  Location: [facility] OR    INCISION AND DRAINAGE BREAST Left 3/15/2023    Procedure: INCISION AND DRAINAGE, BREAST;  Surgeon: Re Ramos MD;  Location: [facility] OR    LAPAROSCOPIC CHOLECYSTECTOMY WITH CHOLANGIOGRAMS N/A 9/24/2014    Procedure: LAPAROSCOPIC CHOLECYSTECTOMY WITH CHOLANGIOGRAMS;  Surgeon: Sheldon Watts MD;  Location: [facility] OR     Family History   History reviewed. No pertinent family history.    Allergies     Allergies   Allergen Reactions    Celecoxib Other (See Comments)     Stacia reports burning and slight swelling of her throat after taking this medication. Provider placed her on Omeprazole for these symptoms, with some relief.    Doxycycline Other (See Comments)     Diarrhea     Social History     Social History     Tobacco Use    Smoking status: Never    Smokeless tobacco: Never   Substance Use Topics    Alcohol use: No     Physical Exam   Blood pressure 114/63, pulse 61, temperature 98  F (36.7  C), temperature source Temporal, resp. rate 18, height 1.6 m (5' 3\"), weight 88.6 kg (195 lb 5.2 oz), last menstrual " period 07/17/2024, SpO2 96%, not currently breastfeeding.    General: Ill appearing, cooperative with exam, in NAD.  HEENT: Atraumatic. No erythema in posterior pharynx.  Lymph: No cervical or inguinal lymphadenopathy.  Cardiac: RRR. No murmurs.  Lungs: CTAB. Nl WOB.  Abd: Non-tender. No rebound or gaurding. Nl bowel sounds.  Ext: No edema. 2+ pulses.  Skin: Left breast with unroofed abscess, no current drainage, mild surrounding erythema  Psych: A&Ox3. Nl affect.  Neuro: 5/5 strength. Sensation intact.    Labs & Imaging     Reviewed and Pertinent results discussed in assessment and plan.

## 2024-08-15 NOTE — PROGRESS NOTES
"Assuming care.  28 years old female patient admitted early this morning.  She has a history of left breast abscess.  She presented to the emergency department because the abscesses has recurred and opened up with spontaneous drainage of pus.  She has been started on antibiotic IV, consult with general surgeon has been requested.  At the moment of my visit she is not having symptoms.    Vital signs:  Temp: 97.9  F (36.6  C) Temp src: Oral BP: 98/63 Pulse: 69   Resp: 18 SpO2: 98 % O2 Device: None (Room air)   Height: 160 cm (5' 3\") Weight: 88.6 kg (195 lb 5.2 oz)  Estimated body mass index is 34.6 kg/m  as calculated from the following:    Height as of this encounter: 1.6 m (5' 3\").    Weight as of this encounter: 88.6 kg (195 lb 5.2 oz).      I agree with assessment and plan as outlined by my partner Davon Smith.  "

## 2024-08-15 NOTE — PLAN OF CARE
"Dx: Left breast abscess    /59 (BP Location: Left arm)   Pulse 52   Temp 97.5  F (36.4  C)   Resp 18   Ht 1.6 m (5' 3\")   Wt 88.9 kg (195 lb 14.4 oz)   LMP 07/17/2024 (Approximate)   SpO2 94%   BMI 34.70 kg/m       A&O x4. Independent. Regular diet tolerated. Reporting no pain. Appropriate behavior. IV saline locked. Ancef q8 hrs. General surgery consult ruled out need for surgery and cleared for discharge. Bed alarm off and call light within reach.     OUTPATIENT/OBSERVATION GOALS TO BE MET BEFORE DISCHARGE:  ADLs back to baseline: Yes    Activity and level of assistance: Ambulating independently.    Pain status: Pain free.    Return to near baseline physical activity: Yes     Discharge Planner Nurse   Safe discharge environment identified: Yes  Barriers to discharge: No       Entered by: Loren Hernandez RN 08/15/2024 1:28 PM     Please review provider order for any additional goals.   Nurse to notify provider when observation goals have been met and patient is ready for discharge.    Goal Outcome Evaluation:      Plan of Care Reviewed With: patient    Overall Patient Progress: no changeOverall Patient Progress: no change    Outcome Evaluation: Gen surg consult ruled out surgery, plan to discharge home today.      "

## 2024-08-15 NOTE — CONSULTS
[facility]  General Surgery Consultation           Stacia Hannon MRN# [id number]   YOB: 1995 Age: 28 year old      Date of Admission:  3/15/2023  Date of Consult: 3/15/2023     Reason for consult:            Left breast abscess       Assessment:    Stacia is a 28 year old female with history of granulomatous mastitis with a nonlactational left breast abscess at 12:00. History of prior breast I&Ds 11/2022 and 3/2023. There is no fluctuance and the area feels much better after antibiotics and spontaneous drainage.    PLAN:  - Recommend antibiotics, doxycycline 100 mg BID x 1 week  - Follow up with Dr. Ramos next week. Patient will need to call clinic to make appointment ([phone])  - No plans for surgical I&D at this time.     Aixa Asher MD      HPI:  Stacia Hannon is a 28 year old female with history of granulomatous mastitis with multiple previous breast abscesses. She started noticing left breast pain approximately 3 days ago. And had a fever to 101F. It started spontaneously draining yeasterday. Since admission, she actually feels much better and denies pain. No fevers overnight.     Imaging:  All imaging studies reviewed by me.  Bedside ultrasound consistent with ultrasound with a small abscess per ED documentation.    Past Medical History:   has a past medical history of Anemia (05/18/2014) and Pancreatitis (08/2014).    Past Surgical History:  Past Surgical History:   Procedure Laterality Date    DILATION AND CURETTAGE SUCTION WITH ULTRASOUND GUIDANCE N/A 2/7/2023    Procedure: Ultrasound Guided Suction Dilation and Curettage;  Surgeon: Max Martinez MD;  Location: [facility] OR    INCISION AND DRAINAGE BREAST Left 3/15/2023    Procedure: INCISION AND DRAINAGE, BREAST;  Surgeon: Re Ramos MD;  Location: [facility] OR    LAPAROSCOPIC CHOLECYSTECTOMY WITH CHOLANGIOGRAMS N/A 9/24/2014    Procedure: LAPAROSCOPIC CHOLECYSTECTOMY WITH CHOLANGIOGRAMS;  Surgeon: Sheldon Watts,  "MD;  Location:  OR       Family History:  History reviewed. No pertinent family history.   As above    Social History:  Social History     Socioeconomic History    Marital status:      Spouse name: Not on file    Number of children: Not on file    Years of education: Not on file    Highest education level: Not on file   Occupational History    Not on file   Tobacco Use    Smoking status: Never    Smokeless tobacco: Never   Substance and Sexual Activity    Alcohol use: No    Drug use: No    Sexual activity: Yes     Partners: Male     Birth control/protection: None     Comment: 2012  ek   Other Topics Concern    Not on file   Social History Narrative    Not on file     Social Determinants of Health     Financial Resource Strain: Not on File (2020)    Received from [facility]    Financial Resource Strain     Financial Resource Strain: 0   Food Insecurity: Not on File (2020)    Received from [facility]    Food Insecurity     Food: 0   Transportation Needs: Not on File (2020)    Received from [facility]    Transportation Needs     Transportation: 0   Physical Activity: Not on File (2020)    Received from [facility]    Physical Activity     Physical Activity: 0   Stress: Not on File (2020)    Received from [facility]    Stress     Stress: 0   Social Connections: Not on File (2020)    Received from [facility]    Social Connections     Social Connections and Isolation: 0   Interpersonal Safety: Not on file   Housing Stability: Not on File (2020)    Received from [facility]    Housing Stability     Housin        ROS:  The 10 point review of systems is negative other than noted in the HPI and above.    PE:  Vitals: /59 (BP Location: Left arm)   Pulse 52   Temp 97.5  F (36.4  C)   Resp 18   Ht 1.6 m (5' 3\")   Wt 88.9 kg (195 lb 14.4 oz)   LMP 2024 (Approximate)   SpO2 94%   BMI 34.70 kg/m    General appearance: well-nourished, sitting " comfortably, no apparent distress  Respirations:  Unlabored  CV: RRR  Extremities: Warm, without edema  Neurologic: alert, speech is clear, moves all extremities with good strength  Psychiatric: Mood and affect are appropriate  Skin: Without lesions, rashes, or jaundice  Breast:    Left breast: Ulcerated area at the 12:00 position without surrounding cellulitis. No drainage. Indurated, but no fluctuance. Mild tenderness to palpation.     Lymph:      Axillary adenopathy: none      Aixa Asher MD

## 2024-08-15 NOTE — ED TRIAGE NOTES
Pt reports a mass to her left breast that is painful on Sunday. Today the pt said the area burst open and its leaking

## 2024-08-15 NOTE — ED PROVIDER NOTES
Emergency Department Note      History of Present Illness     Chief Complaint   Breast Problem      HPI   Stacia Hannon is a 28 year old female who presents to the ER for a breast problem. The patient reports that she has had issues with her left breast for the past year. She has received 2 surgeries on the area for underlying breast abscesses. Three days ago she developed an abscess on her left breast causing pain as well she reports. She has been applying heat to treat the area. Then today her abscess burst causing it to release purulent discharge. She has been taking ibuprofen to manage the pain. She also endorses a subjective fever. Denies chills, vomiting, nipple discharge, and shortness of breath or other symptoms. Denies history of immunosuppression, MRSA, breastfeeding/pregnancy.    Independent Historian   None    Review of External Notes   3/15/23 hospitalization for breast abscess    Past Medical History     Medical History and Problem List   Anxiety   Depression  Breast abscess  Vitamin D deficiency  Hyperlipidemia  Pancreatitis    Medications   fluconazole (DIFLUCAN) 150 MG tablet  loratadine (CLARITIN) 10 MG tablet  omeprazole (PRILOSEC) 20 MG DR capsule        Surgical History   Past Surgical History:   Procedure Laterality Date    DILATION AND CURETTAGE SUCTION WITH ULTRASOUND GUIDANCE N/A 2/7/2023    Procedure: Ultrasound Guided Suction Dilation and Curettage;  Surgeon: Max Martinez MD;  Location: [facility] OR    INCISION AND DRAINAGE BREAST Left 3/15/2023    Procedure: INCISION AND DRAINAGE, BREAST;  Surgeon: Re Ramos MD;  Location: [facility] OR    LAPAROSCOPIC CHOLECYSTECTOMY WITH CHOLANGIOGRAMS N/A 9/24/2014    Procedure: LAPAROSCOPIC CHOLECYSTECTOMY WITH CHOLANGIOGRAMS;  Surgeon: Sheldon Watts MD;  Location: [facility] OR       Physical Exam     Patient Vitals for the past 24 hrs:   BP Temp Temp src Pulse Resp SpO2 Height Weight   08/14/24 2301 (!) 132/100 98  F (36.7  C) Temporal 60 18 99 %  "1.6 m (5' 3\") 88.6 kg (195 lb 5.2 oz)     Physical Exam  Nursing note and vitals reviewed.  Constitutional: Well nourished.   Eyes: Conjunctiva normal.  Pupils are equal, round, and reactive to light.   ENT: Nose normal. Mucous membranes pink and moist.    Neck: Normal range of motion.  CVS: Normal rate, regular rhythm.  Normal heart sounds.    Breast: R. Breast without surrounding warmth/erythema; L. Breast with noted areola at 12 o'clock position with 2 cm region of induration and pustule with spontaneously purulent drainage. No crepitance though TTP.   Pulmonary: Lungs clear to auscultation bilaterally. No wheezes/rales/rhonchi.  GI: Abdomen soft. Nontender, nondistended. No rigidity or guarding.    MSK: No calf tenderness or swelling.  Neuro: Alert. Follows simple commands.  Skin: Skin is warm and dry. No rash noted.   Psychiatric: Normal affect.       Diagnostics     Lab Results   Labs Ordered and Resulted from Time of ED Arrival to Time of ED Departure   BASIC METABOLIC PANEL - Abnormal       Result Value    Sodium 138      Potassium 3.2 (*)     Chloride 101      Carbon Dioxide (CO2) 24      Anion Gap 13      Urea Nitrogen 7.8      Creatinine 0.53      GFR Estimate >90      Calcium 9.3      Glucose 163 (*)    LACTIC ACID WHOLE BLOOD - Abnormal    Lactic Acid 2.4 (*)    HCG QUALITATIVE PREGNANCY - Normal    hCG Serum Qualitative Negative     CBC WITH PLATELETS AND DIFFERENTIAL    WBC Count 6.6      RBC Count 4.15      Hemoglobin 12.7      Hematocrit 39.0      MCV 94      MCH 30.6      MCHC 32.6      RDW 12.3      Platelet Count 310      % Neutrophils 50      % Lymphocytes 40      % Monocytes 7      % Eosinophils 2      % Basophils 0      % Immature Granulocytes 0      NRBCs per 100 WBC 0      Absolute Neutrophils 3.3      Absolute Lymphocytes 2.7      Absolute Monocytes 0.5      Absolute Eosinophils 0.2      Absolute Basophils 0.0      Absolute Immature Granulocytes 0.0      Absolute NRBCs 0.0     LACTIC ACID " WHOLE BLOOD   BLOOD CULTURE   AEROBIC BACTERIAL CULTURE ROUTINE       Imaging   No orders to display       Independent Interpretation   None    ED Course      Medications Administered   Medications   sodium chloride 0.9% BOLUS 1,000 mL (1,000 mLs Intravenous $New Bag 8/15/24 0004)   ceFAZolin (ANCEF) 2 g in 100 mL D5W intermittent infusion (2 g Intravenous $New Bag 8/15/24 0003)   ketorolac (TORADOL) injection 15 mg (15 mg Intravenous $Given 8/15/24 0003)       Procedures   Procedures   PROCEDURE: Limited Bedside ED Ultrasound for Soft Tissue  PERFORMED BY: Dr. Dinora Murillo  INDICATIONS/SYMPTOM:  Skin redness and pain: abscess vs celluliti  PROBE: High frequency linear probe  BODY LOCATION: Soft tissue located on L. Breast areola  FINDINGS:    Cobblestoning of soft tissue: present   Hypoechoic fluid (ie abscess) identified: yes  INTERPRETATION:  The soft tissue and muscle layers were evaluated.   ABSCESS was identified.  Findings indicate cellulitis with associated abscess  DOCUMENTATION: Images were not archived to the [place] hard drive    Discussion of Management   I spoke to general surgery, Dr. Ramos    ED Course   ED Course as of 08/15/24 0044   Wed Aug 14, 2024   2337 I obtained history and performed physical exam as noted above.    u Aug 15, 2024   0043 I spoke with Dr. Smith, of the hospitalist team, regarding the patient. They accepted the patient for admission.         Additional Documentation  None    Medical Decision Making / Diagnosis     CMS Diagnoses: The Lactic acid level is elevated due to early developing sepsis, at this time there is no sign of severe sepsis or septic shock. and None    MIPS       None    [facility]   Stacia Hannon is a 28 year old female presenting with concerns for left breast cellulitis with developing abscess.  Abscess seems to be spontaneously draining at bedside.  Formal wound culture sent.  Patient's lactate initially mildly elevated though repeat downtrending.  No  evidence to suggest severe sepsis at this point in time.  Bedside ultrasound does confirm underlying small abscess.  I did recommend incision and drainage for formal source control though patient is requesting general surgery as she has had this performed by general surgery in the past.  She did discuss risk of worsening infection/scarring though is requesting hospitalization at this point in time.  She was given IV antibiotics during her time in the ED, blood culture sent.  I do not feel further emergent imaging is warranted at this point in time.  She remained otherwise hemodynamically stable, accepted by hospitalist for admission.  General surgery aware of patient.    Disposition   The patient was admitted    Diagnosis     ICD-10-CM    1. Left breast abscess  N61.1            Discharge Medications   New Prescriptions    No medications on file         Scribe Disclosure:  I, Veto Martinez, am serving as a scribe at 11:47 PM on 8/14/2024 to document services personally performed by Dinora Murillo DO based on my observations and the provider's statements to me.        Dinora Murillo DO  08/15/24 9716

## 2024-08-15 NOTE — CONSULTS
Full consult note to follow    Patient feeling better, no fluctuance at abscess site.    Recommend antibiotics alone (doxycycline 100 mg orally twice daily) with followup in surgery clinic (Dr. Ramos) next week.

## 2024-08-15 NOTE — CONSULTS
"Care Management Discharge Note    Discharge Date: 08/16/2024     Discharge Disposition: Home    Discharge Services: None anticipated    Discharge DME: None anticipated     Discharge Transportation: Family or friend    Private pay costs discussed: Not applicable    Does the patient's insurance plan have a 3 day qualifying hospital stay waiver?  No    Handoff Referral Completed: No    Additional Information:  SW consulted for \"patient lacks insurance, will likely need help getting reenrolled\". SW does not assist with obtaining insurance. Her face sheet indicates that pt has a [facility] PMAP plan. SW consulted financial counseling and requested that they follow up with patient and assist as able. CC/SW consult cleared.     AVIVA Esparza, [facility]   Inpatient Care Coordination  [facility]   [phone]        "

## 2024-08-15 NOTE — ED NOTES
[facility]  ED Nurse Handoff Report    ED Chief complaint: Breast Problem  . ED Diagnosis:   Final diagnoses:   Left breast abscess       Allergies:   Allergies   Allergen Reactions    Celecoxib Other (See Comments)     Stacia reports burning and slight swelling of her throat after taking this medication. Provider placed her on Omeprazole for these symptoms, with some relief.    Doxycycline Other (See Comments)     Diarrhea       Code Status: Full Code    Activity level - Baseline/Home:  independent.  Activity Level - Current:   independent.   Lift room needed: No.   Bariatric: No   Needed: No   Isolation: No.   Infection: Not Applicable.     Respiratory status: Room air    Vital Signs (within 30 minutes):   Vitals:    08/14/24 2326 08/14/24 2329 08/14/24 2341 08/15/24 0011   BP:  114/63     Pulse:  61     Resp:       Temp:       TempSrc:       SpO2: 99% 97% 99% 96%   Weight:       Height:           Cardiac Rhythm:  ,      Pain level:    Patient confused: No.   Patient Falls Risk: arm band in place and patient and family education.   Elimination Status: Has voided     Patient Report - Initial Complaint: Breast Problem.   Focused Assessment: Breast Problem        HPI   Stacia Hannon is a 28 year old female who presents to the ER for a breast problem. The patient reports that she has had issues with her left breast for the past year. She has received 2 surgeries on the area for underlying breast abscesses. Three days ago she developed an abscess on her left breast causing pain as well she reports. She has been applying heat to treat the area. Then today her abscess burst causing it to release purulent discharge. She has been taking ibuprofen to manage the pain. She also endorses a subjective fever. Denies chills, vomiting, nipple discharge, and shortness of breath or other symptoms. Denies history of immunosuppression, MRSA, breastfeeding/pregnancy.        Abnormal Results:   Labs  Ordered and Resulted from Time of ED Arrival to Time of ED Departure   BASIC METABOLIC PANEL - Abnormal       Result Value    Sodium 138      Potassium 3.2 (*)     Chloride 101      Carbon Dioxide (CO2) 24      Anion Gap 13      Urea Nitrogen 7.8      Creatinine 0.53      GFR Estimate >90      Calcium 9.3      Glucose 163 (*)    LACTIC ACID WHOLE BLOOD - Abnormal    Lactic Acid 2.4 (*)    HCG QUALITATIVE PREGNANCY - Normal    hCG Serum Qualitative Negative     LACTIC ACID WHOLE BLOOD - Normal    Lactic Acid 1.1     CBC WITH PLATELETS AND DIFFERENTIAL    WBC Count 6.6      RBC Count 4.15      Hemoglobin 12.7      Hematocrit 39.0      MCV 94      MCH 30.6      MCHC 32.6      RDW 12.3      Platelet Count 310      % Neutrophils 50      % Lymphocytes 40      % Monocytes 7      % Eosinophils 2      % Basophils 0      % Immature Granulocytes 0      NRBCs per 100 WBC 0      Absolute Neutrophils 3.3      Absolute Lymphocytes 2.7      Absolute Monocytes 0.5      Absolute Eosinophils 0.2      Absolute Basophils 0.0      Absolute Immature Granulocytes 0.0      Absolute NRBCs 0.0     BLOOD CULTURE   AEROBIC BACTERIAL CULTURE ROUTINE        No orders to display       Treatments provided: See MAR  Family Comments: None  OBS brochure/video discussed/provided to patient:  No  ED Medications:   Medications   ceFAZolin (ANCEF) 2 g in 100 mL D5W intermittent infusion (0 g Intravenous Stopped 8/15/24 0108)   ketorolac (TORADOL) injection 15 mg (15 mg Intravenous $Given 8/15/24 0003)   sodium chloride 0.9% BOLUS 1,000 mL (0 mLs Intravenous Stopped 8/15/24 0124)       Drips infusing:  No  For the majority of the shift this patient was Green.   Interventions performed were None.    Sepsis treatment initiated: No    Cares/treatment/interventions/medications to be completed following ED care: None    ED Nurse Name: Traci Flowers RN  1:41 AM     RECEIVING UNIT ED HANDOFF REVIEW    Above ED Nurse Handoff Report was reviewed: Yes  Reviewed  by: Loren Hernandez RN on August 15, 2024 at 8:14 AM   I Nat called the ED to inform them the note was read: No

## 2024-08-15 NOTE — PLAN OF CARE
ROOM # 212-1    Living Situation (if not independent, order SW consult): Home with   Facility name:  :     Activity level at baseline: Independent  Activity level on admit: independent    Who will be transporting you at discharge: TBD    Patient registered to observation; given Patient Bill of Rights; given the opportunity to ask questions about observation status and their plan of care.  Patient has been oriented to the observation room, bathroom and call light is in place.    Discussed discharge goals and expectations with patient/family.

## 2024-08-16 VITALS
RESPIRATION RATE: 16 BRPM | HEIGHT: 63 IN | TEMPERATURE: 98 F | DIASTOLIC BLOOD PRESSURE: 75 MMHG | SYSTOLIC BLOOD PRESSURE: 120 MMHG | OXYGEN SATURATION: 97 % | BODY MASS INDEX: 34.71 KG/M2 | HEART RATE: 69 BPM | WEIGHT: 195.9 LBS

## 2024-08-16 LAB — POTASSIUM SERPL-SCNC: 3.8 MMOL/L (ref 3.4–5.3)

## 2024-08-16 PROCEDURE — 250N000011 HC RX IP 250 OP 636: Mod: JZ | Performed by: INTERNAL MEDICINE

## 2024-08-16 PROCEDURE — 99239 HOSP IP/OBS DSCHRG MGMT >30: CPT | Performed by: NURSE PRACTITIONER

## 2024-08-16 PROCEDURE — 250N000013 HC RX MED GY IP 250 OP 250 PS 637: Performed by: INTERNAL MEDICINE

## 2024-08-16 RX ORDER — CEPHALEXIN 500 MG/1
1000 CAPSULE ORAL 4 TIMES DAILY
Qty: 56 CAPSULE | Refills: 0 | Status: SHIPPED | OUTPATIENT
Start: 2024-08-16 | End: 2024-08-23

## 2024-08-16 RX ADMIN — SENNOSIDES AND DOCUSATE SODIUM 1 TABLET: 50; 8.6 TABLET ORAL at 10:21

## 2024-08-16 RX ADMIN — CEFAZOLIN SODIUM 2 G: 2 INJECTION, SOLUTION INTRAVENOUS at 06:04

## 2024-08-16 ASSESSMENT — ACTIVITIES OF DAILY LIVING (ADL)
ADLS_ACUITY_SCORE: 20

## 2024-08-16 NOTE — H&P
"Discharge Summary    Stacia Hannon MRN# [id number]   YOB: 1995 Age: 28 year old     Date of Admission:  8/14/2024  Date of Discharge:  8/16/2024 11:42 AM  Admitting Physician:  Fadi Fine MD  Discharge Physician:  GEORGIA Echeverria CNP  Discharging Service:  Hospitalist     Primary Provider: Clinic, [facility]          Discharge Diagnosis:     L Breast mastitis             Discharge Disposition:     Discharged to home           Allergies:     Allergies   Allergen Reactions    Celecoxib Other (See Comments)     Stacia reports burning and slight swelling of her throat after taking this medication. Provider placed her on Omeprazole for these symptoms, with some relief.    Doxycycline Other (See Comments)     Diarrhea              Discharge Medications:     Discharge Medication List as of 8/16/2024 10:37 AM        START taking these medications    Details   cephALEXin (KEFLEX) 500 MG capsule Take 2 capsules (1,000 mg) by mouth 4 times daily for 7 days, Disp-56 capsule, R-0, E-Prescribe                    Condition on Discharge:     Discharge condition: Stable   Discharge vitals: Blood pressure 120/75, pulse 69, temperature 98  F (36.7  C), temperature source Oral, resp. rate 16, height 1.6 m (5' 3\"), weight 88.9 kg (195 lb 14.4 oz), last menstrual period 07/17/2024, SpO2 97%, not currently breastfeeding.   Code status on discharge: Full Code           History of Illness:   See detailed admission note for full details.  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  Patient has a history of granulomatous mastitis of left breast and has needed incision and drainage procedures in past for abscesses.  Was getting steroid injections of left breast but lost her insurance so no longer doing this.  Noticed abscess forming in the left breast about 3 days ago.  Fever to 101 at home.  " Spontaneously started draining today.  In the ED, VSS.  WBC 6.6.  Patient refused to have ER physician to I&D and wanting general surgery to do it.  Started on antibiotics and admitted to medicine.              Procedures / Imaging:     NA           Consultations:     Consultation during this admission received from surgery          Significant Results:     NA             Pending Results:     Wound Culture           Discharge Instructions and Follow-Up:     Discharge diet: Regular   Discharge activity: Activity as tolerated   Discharge follow-up: Follow up with primary care provider in 1 weeks and Surgery   Outpatient therapy: None    Home Care agency: None    Other instructions: Complete course of ABX  Follow up Surgery as outpatient           Hospital Course:     L Breast Abscess  H/O Granulomatous Mastitis  Patient with 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 She use to follow up as an outpatient where she would get steroid injections but she unfortunately lost her insurance. Presented with L breast pain and drainage. Was started on ABX with clinical improving. Surgery consulted and no surgery was indicated, recommended ABX with close follow up. Culture of the wound shows Gram Positive bacilli, culture pending. No history of MRSA. Surgery recommended Doxy at discharge but unfortunately patient has been intolerant to it in the past. Given clinical improvement on Ancef, will discharge on keflex.   - Follow UC  - Keflex 1g QID for 7 days  - Educated about S/S of worsening infection and when she should return to ED  - Surgery referral in 1 week     Total time spent in face to face contact with the patient and coordinating discharge was:  >70 Minutes    GEORGIA Echeverria CNP  August 16, 2024

## 2024-08-16 NOTE — PLAN OF CARE
"PRIMARY DIAGNOSIS: Mastitis of Left Breast with Abscess  OUTPATIENT/OBSERVATION GOALS TO BE MET BEFORE DISCHARGE:  Vitals sign stable or return to baseline: Yes    Tolerating oral antibiotics or has home infusion set up if applicable: Yes    Pain status: Pain free.    Return to near baseline physical activity: Yes    Discharge Planner Nurse   Safe discharge environment identified: Yes  Barriers to discharge: No       Entered by: Elliot Patricio RN 08/16/2024 10:34 AM     Patient is A&Ox4. Her VS are stable on RA. Has a right PIV SL. Currently denies pain. Wound approximately the size of a dime present above left areola - applied gauze/tape to dress per verbal from hospitalist.   On regular diet and tolerating well. Reports constipation - given 1 tablet of senna-docusate. Voids without difficulty.   Up independently in room.   /57 (BP Location: Left arm)   Pulse 53   Temp 98.1  F (36.7  C) (Oral)   Resp 16   Ht 1.6 m (5' 3\")   Wt 88.9 kg (195 lb 14.4 oz)   LMP 07/17/2024 (Approximate)   SpO2 100%   BMI 34.70 kg/m     Please review provider order for any additional goals.     Nurse to notify provider when observation goals have been met and patient is ready for discharge.  Problem: Wound  Goal: Optimal Coping  Outcome: Progressing  Goal: Optimal Functional Ability  Outcome: Progressing  Intervention: Optimize Functional Ability  Recent Flowsheet Documentation  Taken 8/16/2024 1022 by Elliot Patricio RN  Activity Management: up ad phil  Activity Assistance Provided: independent  Goal: Absence of Infection Signs and Symptoms  Outcome: Progressing  Goal: Improved Oral Intake  Outcome: Progressing  Goal: Optimal Pain Control and Function  Outcome: Progressing  Goal: Skin Health and Integrity  Outcome: Progressing  Intervention: Optimize Skin Protection  Recent Flowsheet Documentation  Taken 8/16/2024 1022 by Elliot Patricio RN  Activity Management: up ad phil  Goal: Optimal Wound Healing  Outcome: " "Progressing     Problem: Adult Inpatient Plan of Care  Goal: Plan of Care Review  Description: The Plan of Care Review/Shift note should be completed every shift.  The Outcome Evaluation is a brief statement about your assessment that the patient is improving, declining, or no change.  This information will be displayed automatically on your shift  note.  Outcome: Progressing  Flowsheets (Taken 8/16/2024 1034)  Outcome Evaluation: Gauze dressing applied to wound  Plan of Care Reviewed With: patient  Overall Patient Progress: no change  Goal: Patient-Specific Goal (Individualized)  Description: You can add care plan individualizations to a care plan. Examples of Individualization might be:  \"Parent requests to be called daily at 9am for status\", \"I have a hard time hearing out of my right ear\", or \"Do not touch me to wake me up as it startles  me\".  Outcome: Progressing  Goal: Absence of Hospital-Acquired Illness or Injury  Outcome: Progressing  Intervention: Identify and Manage Fall Risk  Recent Flowsheet Documentation  Taken 8/16/2024 1022 by Elliot Patricio RN  Safety Promotion/Fall Prevention:   assistive device/personal items within reach   clutter free environment maintained   lighting adjusted   nonskid shoes/slippers when out of bed   patient and family education   safety round/check completed  Intervention: Prevent Skin Injury  Recent Flowsheet Documentation  Taken 8/16/2024 1022 by Elliot Patricio RN  Body Position: position changed independently  Intervention: Prevent and Manage VTE (Venous Thromboembolism) Risk  Recent Flowsheet Documentation  Taken 8/16/2024 1022 by Elliot Patricio RN  VTE Prevention/Management: SCDs off (sequential compression devices)  Goal: Optimal Comfort and Wellbeing  Outcome: Progressing  Goal: Readiness for Transition of Care  Outcome: Progressing   Goal Outcome Evaluation:      Plan of Care Reviewed With: patient    Overall Patient Progress: no changeOverall Patient Progress: " no change    Outcome Evaluation: Gauze dressing applied to wound

## 2024-08-16 NOTE — PLAN OF CARE
"Patient's After Visit Summary was reviewed with patient.   Patient verbalized understanding of After Visit Summary, recommended follow up and was given an opportunity to ask questions.   Discharge medications sent home with patient/family: YES   Discharged and left herself.    /75 (BP Location: Left arm)   Pulse 69   Temp 98  F (36.7  C) (Oral)   Resp 16   Ht 1.6 m (5' 3\")   Wt 88.9 kg (195 lb 14.4 oz)   LMP 07/17/2024 (Approximate)   SpO2 97%   BMI 34.70 kg/m           Goal Outcome Evaluation:      Plan of Care Reviewed With: patient    Overall Patient Progress: no changeOverall Patient Progress: no change    Outcome Evaluation: Gauze dressing applied to wound      "

## 2024-08-16 NOTE — PLAN OF CARE
Goal Outcome Evaluation:    Alert and oriented x4, VSS on RA. Independent. Regular diet tolerated. IV saline locked. Ancef Q8 hrs.       Problem: Adult Inpatient Plan of Care  Goal: Absence of Hospital-Acquired Illness or Injury  Intervention: Identify and Manage Fall Risk  Recent Flowsheet Documentation  Taken 8/15/2024 1600 by Juan Jose Hurtado RN  Safety Promotion/Fall Prevention:   clutter free environment maintained   nonskid shoes/slippers when out of bed   room near nurse's station   safety round/check completed  Intervention: Prevent Skin Injury  Recent Flowsheet Documentation  Taken 8/15/2024 1600 by Juan Jose Hurtado RN  Body Position: position changed independently  Intervention: Prevent Infection  Recent Flowsheet Documentation  Taken 8/15/2024 1600 by Juan Jose Hurtado, RN  Infection Prevention: hand hygiene promoted

## 2024-08-16 NOTE — DISCHARGE SUMMARY
"Discharge Summary    Stacia Hannon MRN# [id number]   YOB: 1995 Age: 28 year old     Date of Admission:  8/14/2024  Date of Discharge:  8/16/2024 11:42 AM  Admitting Physician:  Fadi Fine MD  Discharge Physician:  GEORGIA Echeverria CNP  Discharging Service:  Hospitalist     Primary Provider: Clinic, [facility] [place]          Discharge Diagnosis:     L Breast mastitis             Discharge Disposition:     Discharged to home           Allergies:     Allergies   Allergen Reactions    Celecoxib Other (See Comments)     Stacia reports burning and slight swelling of her throat after taking this medication. Provider placed her on Omeprazole for these symptoms, with some relief.    Doxycycline Other (See Comments)     Diarrhea              Discharge Medications:     Discharge Medication List as of 8/16/2024 10:37 AM        START taking these medications    Details   cephALEXin (KEFLEX) 500 MG capsule Take 2 capsules (1,000 mg) by mouth 4 times daily for 7 days, Disp-56 capsule, R-0, E-Prescribe                    Condition on Discharge:     Discharge condition: Stable   Discharge vitals: Blood pressure 120/75, pulse 69, temperature 98  F (36.7  C), temperature source Oral, resp. rate 16, height 1.6 m (5' 3\"), weight 88.9 kg (195 lb 14.4 oz), last menstrual period 07/17/2024, SpO2 97%, not currently breastfeeding.   Code status on discharge: Full Code           History of Illness:   See detailed admission note for full details.  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  Patient has a history of granulomatous mastitis of left breast and has needed incision and drainage procedures in past for abscesses.  Was getting steroid injections of left breast but lost her insurance so no longer doing this.  Noticed abscess forming in the left breast about 3 days ago.  Fever to 101 at home.  " Spontaneously started draining today.  In the ED, VSS.  WBC 6.6.  Patient refused to have ER physician to I&D and wanting general surgery to do it.  Started on antibiotics and admitted to medicine.              Procedures / Imaging:     NA           Consultations:     Consultation during this admission received from surgery          Significant Results:     NA             Pending Results:     Wound Culture           Discharge Instructions and Follow-Up:     Discharge diet: Regular   Discharge activity: Activity as tolerated   Discharge follow-up: Follow up with primary care provider in 1 weeks and Surgery   Outpatient therapy: None    Home Care agency: None    Other instructions: Complete course of ABX  Follow up Surgery as outpatient           Hospital Course:     L Breast Abscess  H/O Granulomatous Mastitis  Patient with 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 She use to follow up as an outpatient where she would get steroid injections but she unfortunately lost her insurance. Presented with L breast pain and drainage. Was started on ABX with clinical improving. Surgery consulted and no surgery was indicated, recommended ABX with close follow up. Culture of the wound shows Gram Positive bacilli, culture pending. No history of MRSA. Surgery recommended Doxy at discharge but unfortunately patient has been intolerant to it in the past. Given clinical improvement on Ancef, will discharge on keflex.   - Follow UC  - Keflex 1g QID for 7 days  - Educated about S/S of worsening infection and when she should return to ED  - Surgery referral in 1 week     Total time spent in face to face contact with the patient and coordinating discharge was:  >70 Minutes    GEORGIA Echeverria CNP  August 16, 2024

## 2024-08-16 NOTE — PLAN OF CARE
"Goal Outcome Evaluation:      Plan of Care Reviewed With: patient    Overall Patient Progress: improvingOverall Patient Progress: improving    Outcome Evaluation: Patient Alert, General Surgery is following. Plan is to discharge home      INPATIENT NOTE: [id number]    PRIMARY PROBLEM: Left breast abscess       /56 (BP Location: Left arm)   Pulse 55   Temp 97.9  F (36.6  C) (Oral)   Resp 16   Ht 1.6 m (5' 3\")   Wt 88.9 kg (195 lb 14.4 oz)   LMP 07/17/2024 (Approximate)   SpO2 97%   BMI 34.70 kg/m         Orientation: Alert and Oriented x4  Neuro: Intact   Pain status: denying pain.   Resp: Lung sounds are Clear. Denies any SOB.   Cardiac: WNL, Denies any Chest Pain   GI: Bowels are active x 4 Quads. Last BM   : Voiding with no concern    Skin: WNL, L-Breast abscess  Peripheral edema:   LDA: Peripheral IV   Infusions: Patient is Saline locked.   Pertinent Labs: Gram + bacilli resembling diphtheroids, K+3.2, 3.7  Diet: Regular  Activity: independent with no assistive devices   Alarms/Safety: None, Indpt.   Consults: Social Work or Care Coordination and General Surgery    Discharge Plan: Home with Self care      Problem: Adult Inpatient Plan of Care  Goal: Plan of Care Review  Description: The Plan of Care Review/Shift note should be completed every shift.  The Outcome Evaluation is a brief statement about your assessment that the patient is improving, declining, or no change.  This information will be displayed automatically on your shift  note.  Outcome: Adequate for Care Transition  Flowsheets (Taken 8/16/2024 0248)  Outcome Evaluation: Patient Alert, General Surgery is following. Plan is to discharge home  Plan of Care Reviewed With: patient  Overall Patient Progress: improving  Goal: Patient-Specific Goal (Individualized)  Description: You can add care plan individualizations to a care plan. Examples of Individualization might be:  \"Parent requests to be called daily at 9am for status\", \"I have a " "hard time hearing out of my right ear\", or \"Do not touch me to wake me up as it startles  me\".  Outcome: Adequate for Care Transition  Goal: Absence of Hospital-Acquired Illness or Injury  Outcome: Adequate for Care Transition  Intervention: Identify and Manage Fall Risk  Recent Flowsheet Documentation  Taken 8/16/2024 0100 by Juliet Anguiano RN  Safety Promotion/Fall Prevention:   assistive device/personal items within reach   clutter free environment maintained   lighting adjusted   nonskid shoes/slippers when out of bed   safety round/check completed   treat underlying cause  Intervention: Prevent Skin Injury  Recent Flowsheet Documentation  Taken 8/16/2024 0100 by Juliet Anguiano RN  Body Position: position changed independently  Intervention: Prevent and Manage VTE (Venous Thromboembolism) Risk  Recent Flowsheet Documentation  Taken 8/16/2024 0100 by Juliet Anguiano RN  VTE Prevention/Management: SCDs off (sequential compression devices)  Intervention: Prevent Infection  Recent Flowsheet Documentation  Taken 8/16/2024 0100 by Juliet Anguiano RN  Infection Prevention:   rest/sleep promoted   personal protective equipment utilized   hand hygiene promoted  Goal: Optimal Comfort and Wellbeing  Outcome: Adequate for Care Transition  Goal: Readiness for Transition of Care  Outcome: Adequate for Care Transition     "

## 2024-08-17 LAB
BACTERIA ABSC ANAEROBE+AEROBE CULT: ABNORMAL
GRAM STAIN RESULT: ABNORMAL
GRAM STAIN RESULT: ABNORMAL

## 2024-08-20 LAB — BACTERIA BLD CULT: NO GROWTH

## 2024-09-04 ENCOUNTER — OFFICE VISIT (OUTPATIENT)
Dept: SURGERY | Facility: CLINIC | Age: 29
End: 2024-09-04
Payer: COMMERCIAL

## 2024-09-04 VITALS
BODY MASS INDEX: 34.55 KG/M2 | WEIGHT: 195 LBS | RESPIRATION RATE: 16 BRPM | HEART RATE: 63 BPM | DIASTOLIC BLOOD PRESSURE: 64 MMHG | HEIGHT: 63 IN | SYSTOLIC BLOOD PRESSURE: 108 MMHG | OXYGEN SATURATION: 99 %

## 2024-09-04 DIAGNOSIS — N61.1 LEFT BREAST ABSCESS: ICD-10-CM

## 2024-09-04 PROCEDURE — 99204 OFFICE O/P NEW MOD 45 MIN: CPT | Performed by: STUDENT IN AN ORGANIZED HEALTH CARE EDUCATION/TRAINING PROGRAM

## 2024-09-04 RX ORDER — LIDOCAINE HYDROCHLORIDE 20 MG/ML
JELLY TOPICAL ONCE
Status: DISCONTINUED | OUTPATIENT
Start: 2024-09-04 | End: 2024-09-25

## 2024-09-04 NOTE — PROGRESS NOTES
Breast Patients      1-Do you have any of the following symptoms? Breast Pain  2-In which breast are you having the symptoms? left  3-Have you had a Mammogram? No  4-Have you ever had a breast cyst drained? Yes  Side: left  Date: blank  5-Have you ever had a breast biopsy? Yes:  Left   -   Date:  blank  6-Have you ever had Breast Cancer? No   7-Is there a history of Breast Cancer in your family? Yes   Relationship to you:    Aunt  8-Have you ever had Ovarian Cancer? No  9-Is there a history of Ovarian Cancer in your family? No  10-Is there a history of Colon Cancer in your family?  No  11-Is there a history of Uterine Cancer in your family?  No  12-Any known genetic abnormalities in your family?  No  13-Summarize your caffeine intake (i.e. coffee, tea, chocolate, soda etc.): coffee 3 times a week      14-What age did your periods begin?  11    15-Date your last menstrual period began?  blank  16-Number of full-term pregnancies:  3    17-Your age when your first child was born? 14  18-Did you nurse your children? Yes  19-Are you pregnant now? No  20-Have you begun menopause? No  21-Have you had either ovary removed?No  22-Do you have breast implants? No   23-Have you used hormone replacement therapy?  No  24-Have you taken oral contraceptive pills?  No  25-Have you had an intrauterine device (IUD) placed?  Yes, For how many years?  2  26-What is your current bra size?  36D

## 2024-09-25 NOTE — ADDENDUM NOTE
Addended by: WILLARD HARRIS on: 9/25/2024 01:33 PM     Modules accepted: Orders    
complains of pain/discomfort

## 2024-10-24 ENCOUNTER — HOSPITAL ENCOUNTER (EMERGENCY)
Facility: CLINIC | Age: 29
Discharge: HOME | End: 2024-10-24
Attending: EMERGENCY MEDICINE | Admitting: EMERGENCY MEDICINE
Payer: COMMERCIAL

## 2024-10-24 VITALS
TEMPERATURE: 97.7 F | DIASTOLIC BLOOD PRESSURE: 68 MMHG | WEIGHT: 198.85 LBS | HEIGHT: 63 IN | HEART RATE: 67 BPM | SYSTOLIC BLOOD PRESSURE: 107 MMHG | BODY MASS INDEX: 35.23 KG/M2 | OXYGEN SATURATION: 99 % | RESPIRATION RATE: 16 BRPM

## 2024-10-24 DIAGNOSIS — R19.7 DIARRHEA, UNSPECIFIED TYPE: ICD-10-CM

## 2024-10-24 LAB
ALBUMIN SERPL BCG-MCNC: 4 G/DL (ref 3.5–5.2)
ALP SERPL-CCNC: 60 U/L (ref 40–150)
ALT SERPL W P-5'-P-CCNC: 25 U/L (ref 0–50)
ANION GAP SERPL CALCULATED.3IONS-SCNC: 14 MMOL/L (ref 7–15)
AST SERPL W P-5'-P-CCNC: 21 U/L (ref 0–45)
BASOPHILS # BLD AUTO: 0 10E3/UL (ref 0–0.2)
BASOPHILS NFR BLD AUTO: 1 %
BILIRUB SERPL-MCNC: <0.2 MG/DL
BUN SERPL-MCNC: 11.1 MG/DL (ref 6–20)
CALCIUM SERPL-MCNC: 8.3 MG/DL (ref 8.8–10.4)
CHLORIDE SERPL-SCNC: 103 MMOL/L (ref 98–107)
CREAT SERPL-MCNC: 0.57 MG/DL (ref 0.51–0.95)
EGFRCR SERPLBLD CKD-EPI 2021: >90 ML/MIN/1.73M2
EOSINOPHIL # BLD AUTO: 0.2 10E3/UL (ref 0–0.7)
EOSINOPHIL NFR BLD AUTO: 2 %
ERYTHROCYTE [DISTWIDTH] IN BLOOD BY AUTOMATED COUNT: 12.1 % (ref 10–15)
GLUCOSE SERPL-MCNC: 144 MG/DL (ref 70–99)
HCG SERPL QL: NEGATIVE
HCO3 SERPL-SCNC: 22 MMOL/L (ref 22–29)
HCT VFR BLD AUTO: 38.2 % (ref 35–47)
HGB BLD-MCNC: 12.6 G/DL (ref 11.7–15.7)
IMM GRANULOCYTES # BLD: 0 10E3/UL
IMM GRANULOCYTES NFR BLD: 0 %
LYMPHOCYTES # BLD AUTO: 2.7 10E3/UL (ref 0.8–5.3)
LYMPHOCYTES NFR BLD AUTO: 39 %
MCH RBC QN AUTO: 30.9 PG (ref 26.5–33)
MCHC RBC AUTO-ENTMCNC: 33 G/DL (ref 31.5–36.5)
MCV RBC AUTO: 94 FL (ref 78–100)
MONOCYTES # BLD AUTO: 0.4 10E3/UL (ref 0–1.3)
MONOCYTES NFR BLD AUTO: 6 %
NEUTROPHILS # BLD AUTO: 3.7 10E3/UL (ref 1.6–8.3)
NEUTROPHILS NFR BLD AUTO: 52 %
NRBC # BLD AUTO: 0 10E3/UL
NRBC BLD AUTO-RTO: 0 /100
PLATELET # BLD AUTO: 311 10E3/UL (ref 150–450)
POTASSIUM SERPL-SCNC: 3.5 MMOL/L (ref 3.4–5.3)
PROT SERPL-MCNC: 7.2 G/DL (ref 6.4–8.3)
RBC # BLD AUTO: 4.08 10E6/UL (ref 3.8–5.2)
SODIUM SERPL-SCNC: 139 MMOL/L (ref 135–145)
WBC # BLD AUTO: 7.1 10E3/UL (ref 4–11)

## 2024-10-24 PROCEDURE — 96374 THER/PROPH/DIAG INJ IV PUSH: CPT

## 2024-10-24 PROCEDURE — 99284 EMERGENCY DEPT VISIT MOD MDM: CPT | Mod: 25

## 2024-10-24 PROCEDURE — 85025 COMPLETE CBC W/AUTO DIFF WBC: CPT | Performed by: EMERGENCY MEDICINE

## 2024-10-24 PROCEDURE — 96375 TX/PRO/DX INJ NEW DRUG ADDON: CPT

## 2024-10-24 PROCEDURE — 80053 COMPREHEN METABOLIC PANEL: CPT | Performed by: EMERGENCY MEDICINE

## 2024-10-24 PROCEDURE — 250N000011 HC RX IP 250 OP 636: Performed by: EMERGENCY MEDICINE

## 2024-10-24 PROCEDURE — 84703 CHORIONIC GONADOTROPIN ASSAY: CPT | Performed by: EMERGENCY MEDICINE

## 2024-10-24 PROCEDURE — 36415 COLL VENOUS BLD VENIPUNCTURE: CPT | Performed by: EMERGENCY MEDICINE

## 2024-10-24 RX ORDER — ONDANSETRON 2 MG/ML
4 INJECTION INTRAMUSCULAR; INTRAVENOUS ONCE
Status: COMPLETED | OUTPATIENT
Start: 2024-10-24 | End: 2024-10-24

## 2024-10-24 RX ORDER — ONDANSETRON 4 MG/1
4 TABLET, ORALLY DISINTEGRATING ORAL EVERY 8 HOURS PRN
Qty: 10 TABLET | Refills: 0 | Status: SHIPPED | OUTPATIENT
Start: 2024-10-24 | End: 2024-10-27

## 2024-10-24 RX ORDER — KETOROLAC TROMETHAMINE 15 MG/ML
10 INJECTION, SOLUTION INTRAMUSCULAR; INTRAVENOUS ONCE
Status: COMPLETED | OUTPATIENT
Start: 2024-10-24 | End: 2024-10-24

## 2024-10-24 RX ADMIN — ONDANSETRON 4 MG: 2 INJECTION INTRAMUSCULAR; INTRAVENOUS at 21:09

## 2024-10-24 RX ADMIN — KETOROLAC TROMETHAMINE 10 MG: 15 INJECTION, SOLUTION INTRAMUSCULAR; INTRAVENOUS at 21:10

## 2024-10-24 ASSESSMENT — COLUMBIA-SUICIDE SEVERITY RATING SCALE - C-SSRS
6. HAVE YOU EVER DONE ANYTHING, STARTED TO DO ANYTHING, OR PREPARED TO DO ANYTHING TO END YOUR LIFE?: NO
2. HAVE YOU ACTUALLY HAD ANY THOUGHTS OF KILLING YOURSELF IN THE PAST MONTH?: NO
1. IN THE PAST MONTH, HAVE YOU WISHED YOU WERE DEAD OR WISHED YOU COULD GO TO SLEEP AND NOT WAKE UP?: NO

## 2024-10-24 ASSESSMENT — ACTIVITIES OF DAILY LIVING (ADL)
ADLS_ACUITY_SCORE: 0
ADLS_ACUITY_SCORE: 0

## 2024-10-25 NOTE — ED NOTES
Pt discharged by writer. Pt looks well, pt tolerated PO well- no N/V. Pt PIV removed. Given AVS and pt agreeable to plan of care. Pt left amb with good gait to exit.

## 2024-10-25 NOTE — DISCHARGE INSTRUCTIONS
Return to the ER for vomiting, fever, worsening diarrhea, inability to take oral fluids, or any new concerns.    Please return a stool sample to the hospital laboratory.

## 2024-10-25 NOTE — ED PROVIDER NOTES
"  Emergency Department Note      History of Present Illness     Chief Complaint   Diarrhea      HPI   Stacia Hannon is a 29 year old female who presents to the ED with 3 days of loose stools and nausea.  She has noticed some mucus in the stool but no blood.  No specific known ill contacts but she has been frequently visiting her mother who is currently in the hospital after an MI.  She denies fever or abdominal pain.  No history of ulcerative colitis, Crohn disease, irritable bowel syndrome, or other intestinal disorder.  She has not been vomiting.  She was admitted to hospital about 2 months ago with a breast abscess.  She received doxycycline at that point but has not had any antibiotics since.  The breast abscess has healed.  She denies any recent travel or dietary changes.    Review of External Notes   Discharge summary reviewed from August 16, 2024.  The patient was admitted with left breast mastitis and was on doxycycline.  She was then given Keflex.    Past Medical History     Medical History and Problem List   Prediabetes  IUGR  Pure hypercholesterolemia  Vit D deficiency  Pancreatitis  Iron deficiency anemia    Medications   Paragard  Keflex  Phentermine  Topamax     Surgical History   Dilation and curettage suction with US guidance  Incision and drainage breast  Laparoscopic cholecystectomy with cholangiograms    Physical Exam     Patient Vitals for the past 24 hrs:   BP Temp Temp src Pulse Resp SpO2 Height Weight   10/24/24 2100 105/63 -- -- 71 -- 99 % -- --   10/24/24 2009 110/75 98.1  F (36.7  C) Temporal 72 18 99 % 1.6 m (5' 3\") 90.2 kg (198 lb 13.7 oz)     Physical Exam  Constitutional:       General: She is not in acute distress.     Appearance: Normal appearance. She is not diaphoretic.   HENT:      Head: Atraumatic.      Mouth/Throat:      Mouth: Mucous membranes are moist.   Eyes:      General: No scleral icterus.     Conjunctiva/sclera: Conjunctivae normal.   Cardiovascular:      Rate and " Rhythm: Normal rate and regular rhythm.      Heart sounds: Normal heart sounds.   Pulmonary:      Effort: No respiratory distress.      Breath sounds: Normal breath sounds.   Abdominal:      General: Abdomen is flat. There is no distension.      Tenderness: There is no abdominal tenderness.   Musculoskeletal:      Cervical back: Neck supple.   Skin:     General: Skin is warm.      Capillary Refill: Capillary refill takes less than 2 seconds.      Findings: No rash.   Neurological:      General: No focal deficit present.      Mental Status: She is alert and oriented to person, place, and time.   Psychiatric:         Mood and Affect: Mood normal.         Behavior: Behavior normal.           Diagnostics     Lab Results   Labs Ordered and Resulted from Time of ED Arrival to Time of ED Departure   COMPREHENSIVE METABOLIC PANEL - Abnormal       Result Value    Sodium 139      Potassium 3.5      Carbon Dioxide (CO2) 22      Anion Gap 14      Urea Nitrogen 11.1      Creatinine 0.57      GFR Estimate >90      Calcium 8.3 (*)     Chloride 103      Glucose 144 (*)     Alkaline Phosphatase 60      AST 21      ALT 25      Protein Total 7.2      Albumin 4.0      Bilirubin Total <0.2     HCG QUALITATIVE PREGNANCY - Normal    hCG Serum Qualitative Negative     CBC WITH PLATELETS AND DIFFERENTIAL    WBC Count 7.1      RBC Count 4.08      Hemoglobin 12.6      Hematocrit 38.2      MCV 94      MCH 30.9      MCHC 33.0      RDW 12.1      Platelet Count 311      % Neutrophils 52      % Lymphocytes 39      % Monocytes 6      % Eosinophils 2      % Basophils 1      % Immature Granulocytes 0      NRBCs per 100 WBC 0      Absolute Neutrophils 3.7      Absolute Lymphocytes 2.7      Absolute Monocytes 0.4      Absolute Eosinophils 0.2      Absolute Basophils 0.0      Absolute Immature Granulocytes 0.0      Absolute NRBCs 0.0     ENTERIC BACTERIA AND VIRUS PANEL BY PCR   C. DIFFICILE TOXIN B PCR WITH REFLEX TO C. DIFFICILE ANTIGEN AND TOXINS A/B  EIA       ED Course      Medications Administered   Medications   ondansetron (ZOFRAN) injection 4 mg (4 mg Intravenous $Given 10/24/24 2109)   ketorolac (TORADOL) injection 10 mg (10 mg Intravenous $Given 10/24/24 2110)       ED Course   ED Course as of 10/24/24 2206   Thu Oct 24, 2024   2025 I obtained history and examined the patient as noted above       Medical Decision Making / Diagnosis       MDM   Stacia Hannon is a 29 year old female who presents to the ED with diarrhea.  Her nausea has resolved with Zofran.  She is able to take fluids.  Her abdomen is nontender.  Laboratory workup overall looks good.  She was not able to provide a stool sample here but will return when to the hospital laboratory.  She will have Zofran prescribed for home use.  We discussed return precautions.    Disposition   The patient was discharged.     Diagnosis     ICD-10-CM    1. Diarrhea, unspecified type  R19.7 Enteric Bacteria and Virus Panel by [facility] Stool     C. difficile Toxin B PCR with reflex to C. difficile Antigen and Toxins A/B EIA           Discharge Medications   New Prescriptions    ONDANSETRON (ZOFRAN ODT) 4 MG ODT TAB    Take 1 tablet (4 mg) by mouth every 8 hours as needed.         Scribe Disclosure:  I, Clay Bridges, am serving as a scribe at 8:40 PM on 10/24/2024 to document services personally performed by Everardo Sandhu, based on my observations and the provider's statements to me.        Everardo Sandhu MD  10/24/24 2206

## 2024-10-25 NOTE — ED TRIAGE NOTES
Pt arrives for diarrhea for a few days. She reports feeling weak, dizzy and headache. Endorses nausea but no vomiting. Reports mucus-like stool. Denies abdominal pain/cramping. AVSS on RA.

## 2025-05-31 ENCOUNTER — HEALTH MAINTENANCE LETTER (OUTPATIENT)
Age: 30
End: 2025-05-31

## (undated) DEVICE — FILTER BERKLEY SAFETOUCH

## (undated) DEVICE — DECANTER VIAL 2006S

## (undated) DEVICE — GLOVE BIOGEL PI MICRO INDICATOR UNDERGLOVE SZ 7.0 48970

## (undated) DEVICE — PREP POVIDONE-IODINE 7.5% SCRUB 4OZ BOTTLE MDS093945

## (undated) DEVICE — LINEN FULL SHEET 5511

## (undated) DEVICE — BAG CLEAR TRASH 1.3M 39X33" P4040C

## (undated) DEVICE — NDL SPINAL 22GA 3.5" QUINCKE 405181

## (undated) DEVICE — PAD CHUX UNDERPAD 30X36" P3036C

## (undated) DEVICE — TRAY PREP DRY SKIN SCRUB 067

## (undated) DEVICE — SOL NACL 0.9% IRRIG 1000ML BOTTLE 2F7124

## (undated) DEVICE — ESU GROUND PAD ADULT W/CORD E7507

## (undated) DEVICE — PREP POVIDONE IODINE SOLUTION 10% 4OZ BOTTLE 29906-004

## (undated) DEVICE — GLOVE BIOGEL PI MICRO SZ 6.5 48565

## (undated) DEVICE — Device

## (undated) DEVICE — SU VICRYL 2-0 TIE 12X18" J905T

## (undated) DEVICE — PACK MINOR CUSTOM RIDGES SBA32RMRMA

## (undated) DEVICE — SYR 10ML FINGER CONTROL W/O NDL 309695

## (undated) DEVICE — PACK MINOR LITHOTOMY RIDGES

## (undated) DEVICE — LINEN TOWEL PACK X10 5473

## (undated) DEVICE — SPECIMEN TRAP VACUUM SUCTION 003984-901

## (undated) DEVICE — DRAPE LAP W/ARMBOARD 29410

## (undated) DEVICE — LINEN HALF SHEET 5512

## (undated) DEVICE — DRSG STERI STRIP 1/2X4" R1547

## (undated) DEVICE — LINEN TOWEL PACK X5 5464

## (undated) DEVICE — TUBING SUCTION VACUUM COLLECTION 6FT 610

## (undated) DEVICE — TUBING SUCTION 12"X1/4" N612

## (undated) DEVICE — GLOVE BIOGEL PI MICRO SZ 7.5 48575

## (undated) RX ORDER — BUPIVACAINE HYDROCHLORIDE 2.5 MG/ML
INJECTION, SOLUTION EPIDURAL; INFILTRATION; INTRACAUDAL
Status: DISPENSED
Start: 2023-03-15

## (undated) RX ORDER — GLYCOPYRROLATE 0.2 MG/ML
INJECTION INTRAMUSCULAR; INTRAVENOUS
Status: DISPENSED
Start: 2023-03-15

## (undated) RX ORDER — LIDOCAINE HYDROCHLORIDE AND EPINEPHRINE 10; 10 MG/ML; UG/ML
INJECTION, SOLUTION INFILTRATION; PERINEURAL
Status: DISPENSED
Start: 2023-03-08

## (undated) RX ORDER — ACETAMINOPHEN 325 MG/1
TABLET ORAL
Status: DISPENSED
Start: 2023-02-07

## (undated) RX ORDER — METRONIDAZOLE 500 MG/100ML
INJECTION, SOLUTION INTRAVENOUS
Status: DISPENSED
Start: 2023-02-07

## (undated) RX ORDER — FENTANYL CITRATE 50 UG/ML
INJECTION, SOLUTION INTRAMUSCULAR; INTRAVENOUS
Status: DISPENSED
Start: 2023-03-15

## (undated) RX ORDER — OXYCODONE HYDROCHLORIDE 5 MG/1
TABLET ORAL
Status: DISPENSED
Start: 2023-03-15

## (undated) RX ORDER — FENTANYL CITRATE 50 UG/ML
INJECTION, SOLUTION INTRAMUSCULAR; INTRAVENOUS
Status: DISPENSED
Start: 2023-02-07

## (undated) RX ORDER — CEFAZOLIN SODIUM 1 G/3ML
INJECTION, POWDER, FOR SOLUTION INTRAMUSCULAR; INTRAVENOUS
Status: DISPENSED
Start: 2023-03-15

## (undated) RX ORDER — BUPIVACAINE HYDROCHLORIDE 2.5 MG/ML
INJECTION, SOLUTION EPIDURAL; INFILTRATION; INTRACAUDAL
Status: DISPENSED
Start: 2023-02-07